# Patient Record
Sex: FEMALE | Race: WHITE | HISPANIC OR LATINO | Employment: PART TIME | URBAN - METROPOLITAN AREA
[De-identification: names, ages, dates, MRNs, and addresses within clinical notes are randomized per-mention and may not be internally consistent; named-entity substitution may affect disease eponyms.]

---
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
## 2022-11-14 ENCOUNTER — OFFICE VISIT (OUTPATIENT)
Dept: OBGYN CLINIC | Facility: CLINIC | Age: 22
End: 2022-11-14

## 2022-11-14 VITALS
HEIGHT: 66 IN | DIASTOLIC BLOOD PRESSURE: 70 MMHG | SYSTOLIC BLOOD PRESSURE: 112 MMHG | WEIGHT: 220.6 LBS | BODY MASS INDEX: 35.45 KG/M2

## 2022-11-14 DIAGNOSIS — N97.9 INFERTILITY, FEMALE: ICD-10-CM

## 2022-11-14 DIAGNOSIS — R63.5 WEIGHT GAIN: ICD-10-CM

## 2022-11-14 DIAGNOSIS — Z11.3 SCREENING FOR STD (SEXUALLY TRANSMITTED DISEASE): ICD-10-CM

## 2022-11-14 DIAGNOSIS — Z01.419 WELL WOMAN EXAM: Primary | ICD-10-CM

## 2022-11-14 RX ORDER — FAMOTIDINE 40 MG/1
40 TABLET, FILM COATED ORAL DAILY
COMMUNITY
Start: 2022-09-02

## 2022-11-14 RX ORDER — ESCITALOPRAM OXALATE 10 MG/1
10 TABLET ORAL DAILY
COMMUNITY
Start: 2022-09-02

## 2022-11-14 NOTE — PROGRESS NOTES
ASSESSMENT & PLAN:   Diagnoses and all orders for this visit:    Well woman exam    Screening for STD (sexually transmitted disease)  -     Chlamydia/GC amplified DNA by PCR    Infertility, female  -     Ambulatory Referral to Infertility; Future    Weight gain  -     TSH, 3rd generation with Free T4 reflex; Future  -     Insulin, fasting; Future    Other orders  -     escitalopram (LEXAPRO) 10 mg tablet; Take 10 mg by mouth daily  -     famotidine (PEPCID) 40 MG tablet; Take 40 mg by mouth daily          The following were reviewed in today's visit: ASCCP guidelines (Pap screening at age 24), Gardisil vaccination, STD testing breast self exam, STD testing, family planning choices, exercise and healthy diet  Patient to return to office in yearly for annual exam      All questions have been answered to her satisfaction  CC:  Annual Gynecologic Examination  Chief Complaint   Patient presents with   • New Patient Visit     States this is her first GYN visit, would like STD testing  States she has been trying to conceive for the last 9 months  HPI: Danish Singer is a 23 y o  No obstetric history on file  who presents for annual gynecologic examination  She has the following concerns:  Patient states she has been actively trying to conceive for 9 months without success  She states she has never been pregnant and her partner has never fathered any children  She states her periods are regular every 28 days and bleeding lasts 5-6 days  She would like a referral to a reproductive specialist   Patient reports she has noticed a 40lb weight gain in the last 6 months  She is not exercising and states her diet could be better  Health Maintenance:    Exercise: rarely  Breast exams/breast awareness: yes  Diet: could be better, currently in the process of moving      Past Medical History:   Diagnosis Date   • Depression    • GERD (gastroesophageal reflux disease)        History reviewed   No pertinent surgical history  Past OB/Gyn History:  Period Cycle (Days): 28  Period Duration (Days): 5-6  Period Pattern: Regular  Menstrual Flow: ModeratePatient's last menstrual period was 10/31/2022  Pap not indicated, start screening at age 24  HPV vaccine completed: Yes, 2014    Patient is currently sexually active     STD testing: yes  Current contraception: none      Family History  Family History   Problem Relation Age of Onset   • Thyroid disease Mother    • Cancer Father        Family history of uterine or ovarian cancer: no  Family history of breast cancer: no  Family history of colon cancer: no    Social History:  Social History     Socioeconomic History   • Marital status: Single     Spouse name: Not on file   • Number of children: Not on file   • Years of education: Not on file   • Highest education level: Not on file   Occupational History   • Not on file   Tobacco Use   • Smoking status: Passive Smoke Exposure - Never Smoker   • Smokeless tobacco: Never Used   Vaping Use   • Vaping Use: Every day   • Substances: Nicotine, Flavoring   Substance and Sexual Activity   • Alcohol use: Yes     Comment: socially   • Drug use: Yes     Types: Marijuana   • Sexual activity: Yes     Partners: Male     Birth control/protection: None   Other Topics Concern   • Not on file   Social History Narrative   • Not on file     Social Determinants of Health     Financial Resource Strain: Not on file   Food Insecurity: Not on file   Transportation Needs: Not on file   Physical Activity: Not on file   Stress: Not on file   Social Connections: Not on file   Intimate Partner Violence: Not on file   Housing Stability: Not on file     Domestic violence screen: negative      Allergies:  No Known Allergies    Medications:    Current Outpatient Medications:   •  escitalopram (LEXAPRO) 10 mg tablet, Take 10 mg by mouth daily, Disp: , Rfl:   •  famotidine (PEPCID) 40 MG tablet, Take 40 mg by mouth daily, Disp: , Rfl:     Review of Systems:  Review of Systems   Constitutional: Positive for unexpected weight change  Negative for chills and fever  Respiratory: Negative for shortness of breath  Cardiovascular: Negative for chest pain  Gastrointestinal: Negative for abdominal pain, diarrhea, nausea and vomiting  Skin: Negative for rash  Psychiatric/Behavioral: Negative for dysphoric mood  The patient is not nervous/anxious  Physical Exam:  /70 (BP Location: Right arm, Patient Position: Sitting, Cuff Size: Large)   Ht 5' 6" (1 676 m)   Wt 100 kg (220 lb 9 6 oz)   LMP 10/31/2022   BMI 35 61 kg/m²    Physical Exam  Constitutional:       General: She is not in acute distress  Appearance: Normal appearance  She is obese  She is not ill-appearing, toxic-appearing or diaphoretic  Genitourinary:      Vulva and urethral meatus normal       No lesions in the vagina  Right Labia: No rash or lesions  Left Labia: No lesions or rash  No vaginal discharge, erythema or bleeding  Right Adnexa: not tender and no mass present  Left Adnexa: not tender and no mass present  No cervical discharge or lesion  Uterus is not tender  Breasts:      Breast exam comments: deferred  HENT:      Head: Normocephalic and atraumatic  Cardiovascular:      Rate and Rhythm: Normal rate and regular rhythm  Heart sounds: Normal heart sounds  No murmur heard  No friction rub  No gallop  Pulmonary:      Effort: Pulmonary effort is normal       Breath sounds: Normal breath sounds  No wheezing, rhonchi or rales  Abdominal:      General: There is no distension  Palpations: Abdomen is soft  Tenderness: There is no abdominal tenderness  Musculoskeletal:      Cervical back: Neck supple  Neurological:      General: No focal deficit present  Mental Status: She is alert  Skin:     General: Skin is warm and dry     Psychiatric:         Mood and Affect: Mood normal          Behavior: Behavior normal    Vitals reviewed

## 2022-11-16 DIAGNOSIS — A74.9 CHLAMYDIA INFECTION: Primary | ICD-10-CM

## 2022-11-16 LAB
C TRACH DNA SPEC QL NAA+PROBE: POSITIVE
N GONORRHOEA DNA SPEC QL NAA+PROBE: NEGATIVE

## 2022-11-16 RX ORDER — DOXYCYCLINE 100 MG/1
100 TABLET ORAL 2 TIMES DAILY
Qty: 14 TABLET | Refills: 0 | Status: SHIPPED | OUTPATIENT
Start: 2022-11-16 | End: 2022-11-23

## 2022-12-16 ENCOUNTER — OFFICE VISIT (OUTPATIENT)
Dept: OBGYN CLINIC | Facility: CLINIC | Age: 22
End: 2022-12-16

## 2022-12-16 VITALS
BODY MASS INDEX: 36.8 KG/M2 | HEIGHT: 66 IN | DIASTOLIC BLOOD PRESSURE: 70 MMHG | SYSTOLIC BLOOD PRESSURE: 118 MMHG | WEIGHT: 229 LBS

## 2022-12-16 DIAGNOSIS — Z86.19 HISTORY OF CHLAMYDIA INFECTION: Primary | ICD-10-CM

## 2022-12-16 NOTE — PROGRESS NOTES
Assessment/Plan:  - Test of cure collected  - Patient to remain abstinent until negative test results  - Will reach out to patient with results       Diagnoses and all orders for this visit:    History of chlamydia infection  -     Chlamydia/GC amplified DNA by PCR          Subjective:      Patient ID: Jamaal Adams is a 23 y o  female  Randy Goff is a 25YO G0 female presenting to the office for a test of cure following treatment for CT infection  Patient was treated with doxy 100mg BID x 7 days  Patient states she completed the full antibiotic course  She and her partner have both been treated  Patient states they have been abstinent since diagnosis of infection  She denies vaginal discharge or pain  The following portions of the patient's history were reviewed and updated as appropriate: allergies, current medications, past family history, past medical history, past social history, past surgical history and problem list     Review of Systems   Constitutional: Negative for chills, fever and unexpected weight change  Respiratory: Negative for shortness of breath  Cardiovascular: Negative for chest pain  Gastrointestinal: Negative for abdominal pain  Genitourinary: Negative for vaginal discharge and vaginal pain  Skin: Negative for rash  Objective:      /70   Ht 5' 6" (1 676 m)   Wt 104 kg (229 lb)   LMP 11/29/2022   BMI 36 96 kg/m²          Physical Exam  Vitals reviewed  Constitutional:       Appearance: Normal appearance  HENT:      Head: Normocephalic and atraumatic  Pulmonary:      Effort: Pulmonary effort is normal    Abdominal:      General: There is no distension  Palpations: Abdomen is soft  Tenderness: There is no abdominal tenderness  Genitourinary:     General: Normal vulva  Exam position: Lithotomy position  Pubic Area: No rash  Labia:         Right: No rash or lesion  Left: No rash or lesion         Vagina: Vaginal discharge (white) present  No tenderness or lesions  Cervix: No discharge or lesion  Skin:     General: Skin is warm and dry  Findings: No lesion or rash  Neurological:      General: No focal deficit present  Mental Status: She is alert

## 2022-12-17 LAB
C TRACH DNA SPEC QL NAA+PROBE: NEGATIVE
N GONORRHOEA DNA SPEC QL NAA+PROBE: NEGATIVE

## 2023-01-03 ENCOUNTER — LAB (OUTPATIENT)
Dept: LAB | Facility: HOSPITAL | Age: 23
End: 2023-01-03

## 2023-01-03 DIAGNOSIS — R63.5 WEIGHT GAIN: ICD-10-CM

## 2023-01-03 LAB
INSULIN SERPL-ACNC: 19.3 MU/L (ref 3–25)
TSH SERPL DL<=0.05 MIU/L-ACNC: 0.5 UIU/ML (ref 0.45–4.5)

## 2023-01-09 ENCOUNTER — HOSPITAL ENCOUNTER (EMERGENCY)
Facility: HOSPITAL | Age: 23
Discharge: HOME | End: 2023-01-09
Attending: EMERGENCY MEDICINE

## 2023-01-09 ENCOUNTER — APPOINTMENT (EMERGENCY)
Dept: ULTRASOUND IMAGING | Facility: HOSPITAL | Age: 23
End: 2023-01-09

## 2023-01-09 VITALS
BODY MASS INDEX: 37.33 KG/M2 | TEMPERATURE: 98.8 F | DIASTOLIC BLOOD PRESSURE: 69 MMHG | WEIGHT: 231.26 LBS | RESPIRATION RATE: 16 BRPM | SYSTOLIC BLOOD PRESSURE: 102 MMHG | OXYGEN SATURATION: 100 % | HEART RATE: 75 BPM

## 2023-01-09 DIAGNOSIS — O20.0 THREATENED MISCARRIAGE IN EARLY PREGNANCY: Primary | ICD-10-CM

## 2023-01-09 LAB
ABO GROUP BLD: NORMAL
ABO GROUP BLD: NORMAL
ALBUMIN SERPL BCP-MCNC: 3.3 G/DL (ref 3.5–5)
ALP SERPL-CCNC: 91 U/L (ref 46–116)
ALT SERPL W P-5'-P-CCNC: 23 U/L (ref 12–78)
ANION GAP SERPL CALCULATED.3IONS-SCNC: 9 MMOL/L (ref 4–13)
APTT PPP: 29 SECONDS (ref 23–37)
AST SERPL W P-5'-P-CCNC: 17 U/L (ref 5–45)
B-HCG SERPL-ACNC: 1690 MIU/ML (ref 0–11.6)
BACTERIA UR QL AUTO: ABNORMAL /HPF
BASOPHILS # BLD AUTO: 0.04 THOUSANDS/ÂΜL (ref 0–0.1)
BASOPHILS NFR BLD AUTO: 0 % (ref 0–1)
BILIRUB SERPL-MCNC: 0.18 MG/DL (ref 0.2–1)
BILIRUB UR QL STRIP: NEGATIVE
BLD GP AB SCN SERPL QL: NEGATIVE
BUN SERPL-MCNC: 12 MG/DL (ref 5–25)
CALCIUM ALBUM COR SERPL-MCNC: 9.7 MG/DL (ref 8.3–10.1)
CALCIUM SERPL-MCNC: 9.1 MG/DL (ref 8.3–10.1)
CHLORIDE SERPL-SCNC: 103 MMOL/L (ref 96–108)
CLARITY UR: CLEAR
CO2 SERPL-SCNC: 26 MMOL/L (ref 21–32)
COLOR UR: YELLOW
CREAT SERPL-MCNC: 0.75 MG/DL (ref 0.6–1.3)
EOSINOPHIL # BLD AUTO: 0.29 THOUSAND/ÂΜL (ref 0–0.61)
EOSINOPHIL NFR BLD AUTO: 3 % (ref 0–6)
ERYTHROCYTE [DISTWIDTH] IN BLOOD BY AUTOMATED COUNT: 14.7 % (ref 11.6–15.1)
EXT PREGNANCY TEST URINE: POSITIVE
EXT. CONTROL: ABNORMAL
GFR SERPL CREATININE-BSD FRML MDRD: 115 ML/MIN/1.73SQ M
GLUCOSE SERPL-MCNC: 105 MG/DL (ref 65–140)
GLUCOSE UR STRIP-MCNC: NEGATIVE MG/DL
HCT VFR BLD AUTO: 36 % (ref 34.8–46.1)
HGB BLD-MCNC: 11.6 G/DL (ref 11.5–15.4)
HGB UR QL STRIP.AUTO: ABNORMAL
IMM GRANULOCYTES # BLD AUTO: 0.04 THOUSAND/UL (ref 0–0.2)
IMM GRANULOCYTES NFR BLD AUTO: 0 % (ref 0–2)
INR PPP: 1.02 (ref 0.84–1.19)
KETONES UR STRIP-MCNC: NEGATIVE MG/DL
LEUKOCYTE ESTERASE UR QL STRIP: ABNORMAL
LYMPHOCYTES # BLD AUTO: 2.2 THOUSANDS/ÂΜL (ref 0.6–4.47)
LYMPHOCYTES NFR BLD AUTO: 22 % (ref 14–44)
MCH RBC QN AUTO: 27.7 PG (ref 26.8–34.3)
MCHC RBC AUTO-ENTMCNC: 32.2 G/DL (ref 31.4–37.4)
MCV RBC AUTO: 86 FL (ref 82–98)
MONOCYTES # BLD AUTO: 0.53 THOUSAND/ÂΜL (ref 0.17–1.22)
MONOCYTES NFR BLD AUTO: 5 % (ref 4–12)
NEUTROPHILS # BLD AUTO: 6.95 THOUSANDS/ÂΜL (ref 1.85–7.62)
NEUTS SEG NFR BLD AUTO: 70 % (ref 43–75)
NITRITE UR QL STRIP: NEGATIVE
NON-SQ EPI CELLS URNS QL MICRO: ABNORMAL /HPF
NRBC BLD AUTO-RTO: 0 /100 WBCS
PH UR STRIP.AUTO: 6.5 [PH] (ref 4.5–8)
PLATELET # BLD AUTO: 367 THOUSANDS/UL (ref 149–390)
PMV BLD AUTO: 9.3 FL (ref 8.9–12.7)
POTASSIUM SERPL-SCNC: 3.8 MMOL/L (ref 3.5–5.3)
PROT SERPL-MCNC: 7.3 G/DL (ref 6.4–8.4)
PROT UR STRIP-MCNC: NEGATIVE MG/DL
PROTHROMBIN TIME: 13.4 SECONDS (ref 11.6–14.5)
RBC # BLD AUTO: 4.19 MILLION/UL (ref 3.81–5.12)
RBC #/AREA URNS AUTO: ABNORMAL /HPF
RH BLD: POSITIVE
RH BLD: POSITIVE
SODIUM SERPL-SCNC: 138 MMOL/L (ref 135–147)
SP GR UR STRIP.AUTO: 1.02 (ref 1–1.03)
SPECIMEN EXPIRATION DATE: NORMAL
UROBILINOGEN UR QL STRIP.AUTO: 0.2 E.U./DL
WBC # BLD AUTO: 10.05 THOUSAND/UL (ref 4.31–10.16)
WBC #/AREA URNS AUTO: ABNORMAL /HPF

## 2023-01-09 RX ADMIN — SODIUM CHLORIDE 1000 ML: 0.9 INJECTION, SOLUTION INTRAVENOUS at 14:47

## 2023-01-09 NOTE — ED PROVIDER NOTES
History  Chief Complaint   Patient presents with   • Vaginal Bleeding     Pt pregnant unsure how far along  LMP NOV 27th  Pt reports light spotting since yesterday  24 y/o female with vaginal bleeding/spotting since yest   She is about 6 weeks pregnant by LMP of 11/27  She hasn't been seen by ob-gyn yet and she didn't have an ultrasound yet  No abd  Pain now  No urinary symptoms  She's been vomiting the past few days  Not vomiting today, no diarrhea  Prior to Admission Medications   Prescriptions Last Dose Informant Patient Reported? Taking?   escitalopram (LEXAPRO) 10 mg tablet   Yes No   Sig: Take 10 mg by mouth daily   famotidine (PEPCID) 40 MG tablet   Yes No   Sig: Take 40 mg by mouth daily      Facility-Administered Medications: None       Past Medical History:   Diagnosis Date   • Depression    • GERD (gastroesophageal reflux disease)        History reviewed  No pertinent surgical history  Family History   Problem Relation Age of Onset   • Thyroid disease Mother    • Cancer Father      I have reviewed and agree with the history as documented  E-Cigarette/Vaping   • E-Cigarette Use Current Every Day User      E-Cigarette/Vaping Substances   • Nicotine Yes    • THC No    • CBD No    • Flavoring Yes    • Other No    • Unknown No      Social History     Tobacco Use   • Smoking status: Never     Passive exposure: Yes   • Smokeless tobacco: Never   Vaping Use   • Vaping Use: Every day   • Substances: Nicotine, Flavoring   Substance Use Topics   • Alcohol use: Not Currently     Comment: socially   • Drug use: Not Currently     Types: Marijuana       Review of Systems   Constitutional: Negative for appetite change, fatigue and fever  HENT: Negative for rhinorrhea and sore throat  Eyes: Negative for pain  Respiratory: Negative for cough, shortness of breath and wheezing  Cardiovascular: Negative for chest pain and leg swelling  Gastrointestinal: Positive for nausea and vomiting  Negative for abdominal pain and diarrhea  Genitourinary: Positive for vaginal bleeding  Negative for dysuria and flank pain  Musculoskeletal: Negative for back pain and neck pain  Skin: Negative for rash  Neurological: Negative for syncope and headaches  Psychiatric/Behavioral:        Mood normal       Physical Exam  Physical Exam  Vitals and nursing note reviewed  Constitutional:       Appearance: She is well-developed  HENT:      Head: Normocephalic and atraumatic  Right Ear: External ear normal       Left Ear: External ear normal    Eyes:      General: No scleral icterus  Extraocular Movements: Extraocular movements intact  Cardiovascular:      Rate and Rhythm: Normal rate and regular rhythm  Pulmonary:      Effort: Pulmonary effort is normal  No respiratory distress  Breath sounds: Normal breath sounds  Abdominal:      Palpations: Abdomen is soft  Tenderness: There is no abdominal tenderness  Musculoskeletal:         General: No deformity or signs of injury  Normal range of motion  Cervical back: Normal range of motion and neck supple  Skin:     General: Skin is warm and dry  Coloration: Skin is not jaundiced or pale  Neurological:      General: No focal deficit present  Mental Status: She is alert and oriented to person, place, and time     Psychiatric:         Mood and Affect: Mood normal          Behavior: Behavior normal          Vital Signs  ED Triage Vitals   Temperature Pulse Respirations Blood Pressure SpO2   01/09/23 1416 01/09/23 1416 01/09/23 1416 01/09/23 1416 01/09/23 1416   98 8 °F (37 1 °C) 100 17 119/69 98 %      Temp Source Heart Rate Source Patient Position - Orthostatic VS BP Location FiO2 (%)   01/09/23 1416 01/09/23 1416 01/09/23 1416 01/09/23 1416 --   Oral Monitor Sitting Right arm       Pain Score       01/09/23 1646       No Pain           Vitals:    01/09/23 1416 01/09/23 1646   BP: 119/69 102/69   Pulse: 100 75   Patient Position - Orthostatic VS: Sitting Sitting         Visual Acuity      ED Medications  Medications   sodium chloride 0 9 % bolus 1,000 mL (0 mL Intravenous Stopped 1/9/23 1646)       Diagnostic Studies  Results Reviewed     Procedure Component Value Units Date/Time    Quantitative hCG [[id number]]  (Abnormal) Collected: 01/09/23 1453    Lab Status: Final result Specimen: Blood from Arm, Left Updated: 01/09/23 1708     HCG, Quant 1,690 mIU/mL     Narrative:       Expected Ranges:     Approximate               Approximate HCG  Gestation age          Concentration ( mIU/mL)  _____________          ______________________   Catrina Bon                      HCG values  0 2-1                       5-50  1-2                           2-3                         100-5000  3-4                         500-33696  4-5                         1000-57001  5-6                         [id number]  6-8                         [id number]  8-12                        [id number]      Urine culture [[id number]] Collected: 01/09/23 1458    Lab Status:  In process Specimen: Urine, Clean Catch Updated: 01/09/23 1637    Urine Microscopic [[id number]]  (Abnormal) Collected: 01/09/23 1458    Lab Status: Final result Specimen: Urine Updated: 01/09/23 1601     RBC, UA None Seen /hpf      WBC, UA 4-10 /hpf      Epithelial Cells Occasional /hpf      Bacteria, UA Occasional /hpf     Comprehensive metabolic panel [[id number]]  (Abnormal) Collected: 01/09/23 1453    Lab Status: Final result Specimen: Blood from Arm, Left Updated: 01/09/23 1540     Sodium 138 mmol/L      Potassium 3 8 mmol/L      Chloride 103 mmol/L      CO2 26 mmol/L      ANION GAP 9 mmol/L      BUN 12 mg/dL      Creatinine 0 75 mg/dL      Glucose 105 mg/dL      Calcium 9 1 mg/dL      Corrected Calcium 9 7 mg/dL      AST 17 U/L      ALT 23 U/L      Alkaline Phosphatase 91 U/L      Total Protein 7 3 g/dL      Albumin 3 3 g/dL      Total Bilirubin 0 18 mg/dL      eGFR 115 ml/min/1 73sq m     Narrative:      National Kid[employer] Disease Foundation guidelines for Chronic Kidney Disease (CKD):   •  Stage 1 with normal or high GFR (GFR > 90 mL/min/1 73 square meters)  •  Stage 2 Mild CKD (GFR = 60-89 mL/min/1 73 square meters)  •  Stage 3A Moderate CKD (GFR = 45-59 mL/min/1 73 square meters)  •  Stage 3B Moderate CKD (GFR = 30-44 mL/min/1 73 square meters)  •  Stage 4 Severe CKD (GFR = 15-29 mL/min/1 73 square meters)  •  Stage 5 End Stage CKD (GFR <15 mL/min/1 73 square meters)  Note: GFR calculation is accurate only with a steady state creatinine    Protime-INR [[id number]]  (Normal) Collected: 01/09/23 1453    Lab Status: Final result Specimen: Blood from Arm, Left Updated: 01/09/23 1531     Protime 13 4 seconds      INR 1 02    APTT [[id number]]  (Normal) Collected: 01/09/23 1453    Lab Status: Final result Specimen: Blood from Arm, Left Updated: 01/09/23 1531     PTT 29 seconds     CBC and differential [[id number]] Collected: 01/09/23 1453    Lab Status: Final result Specimen: Blood from Arm, Left Updated: 01/09/23 1512     WBC 10 05 Thousand/uL      RBC 4 19 Million/uL      Hemoglobin 11 6 g/dL      Hematocrit 36 0 %      MCV 86 fL      MCH 27 7 pg      MCHC 32 2 g/dL      RDW 14 7 %      MPV 9 3 fL      Platelets 174 Thousands/uL      nRBC 0 /100 WBCs      Neutrophils Relative 70 %      Immat GRANS % 0 %      Lymphocytes Relative 22 %      Monocytes Relative 5 %      Eosinophils Relative 3 %      Basophils Relative 0 %      Neutrophils Absolute 6 95 Thousands/µL      Immature Grans Absolute 0 04 Thousand/uL      Lymphocytes Absolute 2 20 Thousands/µL      Monocytes Absolute 0 53 Thousand/µL      Eosinophils Absolute 0 29 Thousand/µL      Basophils Absolute 0 04 Thousands/µL     POCT pregnancy, urine [[id number]]  (Abnormal) Resulted: 01/09/23 1509    Lab Status: Final result Updated: 01/09/23 1509     EXT Preg Test, Ur Positive     Control Valid    Urine Macroscopic, POC [[id number]]  (Abnormal) Collected: 01/09/23 1458    Lab Status: Final result Specimen: Urine Updated: 01/09/23 1459     Color, UA Yellow     Clarity, UA Clear     pH, UA 6 5     Leukocytes, UA Trace     Nitrite, UA Negative     Protein, UA Negative mg/dl      Glucose, UA Negative mg/dl      Ketones, UA Negative mg/dl      Urobilinogen, UA 0 2 E U /dl      Bilirubin, UA Negative     Occult Blood, UA Trace     Specific [place], UA 1 025    Narrative:      CLINITEK RESULT                 US OB < 14 weeks single or first gestation level 1   Final Result by Morris Duval MD (01/09 1821)      Single gestational sac in the endometrial cavity with mean sac diameter of 5 mm and a 2 mm yolk sac  No fetal pole clearly visualized  Corpus luteum in right ovary  Otherwise unremarkable sonographic appearance of the ovaries  Workstation performed: [id number]                    Procedures  Procedures         ED Course                                             Medical Decision Making  I went over lab results and ultrasound with the pt   - she is stable to follow up with ob-gyn as an outpt  - she will call in the morning to get an appt  This week for a recheck and another ultrasound in the office  Threatened miscarriage in early pregnancy: acute illness or injury  Amount and/or Complexity of Data Reviewed  Labs: ordered  Radiology: ordered  Disposition  Final diagnoses:   Threatened miscarriage in early pregnancy     Time reflects when diagnosis was documented in both MDM as applicable and the Disposition within this note     Time User Action Codes Description Comment    1/9/2023  6:37 PM Deepak Durán Cha Add [O20 0] Threatened miscarriage in early pregnancy       ED Disposition     ED Disposition   Discharge    Condition   Stable    Date/Time   Mon Jan 9, 2023  6:37 PM    Comment   Temo Brooks discharge to home/self care                 Follow-up Information     Follow up With Specialties Details Why Contact Info Additional [id number] [facility]   [address] [facility] [place]  [address] [facility] [address]  [facility], [address] [facility] [place] [address] [place], [place], [address] [phone]          Discharge Medication List as of 1/9/2023  6:37 PM      CONTINUE these medications which have NOT CHANGED    Details   escitalopram (LEXAPRO) 10 mg tablet Take 10 mg by mouth daily, Starting Fri 9/2/2022, Historical Med      famotidine (PEPCID) 40 MG tablet Take 40 mg by mouth daily, Starting Fri 9/2/2022, Historical Med             No discharge procedures on file      PDMP Review     None          ED Provider  Electronically Signed by           Geneva Hughes MD  01/09/23 1613

## 2023-01-10 ENCOUNTER — TELEPHONE (OUTPATIENT)
Dept: OBGYN CLINIC | Facility: CLINIC | Age: 23
End: 2023-01-10

## 2023-01-10 NOTE — TELEPHONE ENCOUNTER
Patient called, she is newly pregnant  She has an US apt on 1/30  She went to the ED for an episode of spotting  Her HCG level yesterday was 1690  They did an US in the ED which showed a gestational sac in the endometrial cavity with mean diameter of 5 mm and a 2 mm yolk sac  No fetal pole clearly visualized  She had some more spotting after she left the ED  Today she denies any spotting  She doesn't have any pain or bleeding  The ED told her to call and make another US apt this week  Do you recommend doing this or possible an US next week  Do you want another set of HCG levels to be done tomorrow?

## 2023-01-11 LAB
BACTERIA UR CULT: ABNORMAL
BACTERIA UR CULT: ABNORMAL

## 2023-01-12 ENCOUNTER — TELEPHONE (OUTPATIENT)
Dept: EMERGENCY DEPT | Facility: HOSPITAL | Age: 23
End: 2023-01-12

## 2023-01-12 DIAGNOSIS — N39.0 UTI (URINARY TRACT INFECTION): Primary | ICD-10-CM

## 2023-01-12 RX ORDER — CEPHALEXIN 500 MG/1
500 CAPSULE ORAL EVERY 6 HOURS SCHEDULED
Qty: 28 CAPSULE | Refills: 0 | Status: SHIPPED | OUTPATIENT
Start: 2023-01-12 | End: 2023-01-19

## 2023-01-15 ENCOUNTER — HOSPITAL ENCOUNTER (EMERGENCY)
Facility: HOSPITAL | Age: 23
Discharge: HOME | End: 2023-01-15
Attending: EMERGENCY MEDICINE

## 2023-01-15 VITALS
SYSTOLIC BLOOD PRESSURE: 139 MMHG | HEART RATE: 89 BPM | DIASTOLIC BLOOD PRESSURE: 61 MMHG | BODY MASS INDEX: 37.26 KG/M2 | WEIGHT: 230.82 LBS | OXYGEN SATURATION: 99 % | RESPIRATION RATE: 17 BRPM | TEMPERATURE: 98.6 F

## 2023-01-15 DIAGNOSIS — O20.0 THREATENED MISCARRIAGE IN EARLY PREGNANCY: Primary | ICD-10-CM

## 2023-01-15 LAB
ALBUMIN SERPL BCP-MCNC: 3.2 G/DL (ref 3.5–5)
ALP SERPL-CCNC: 86 U/L (ref 46–116)
ALT SERPL W P-5'-P-CCNC: 19 U/L (ref 12–78)
ANION GAP SERPL CALCULATED.3IONS-SCNC: 8 MMOL/L (ref 4–13)
AST SERPL W P-5'-P-CCNC: 19 U/L (ref 5–45)
B-HCG SERPL-ACNC: 1613 MIU/ML (ref 0–11.6)
BACTERIA UR QL AUTO: ABNORMAL /HPF
BASOPHILS # BLD AUTO: 0.04 THOUSANDS/ÂΜL (ref 0–0.1)
BASOPHILS NFR BLD AUTO: 0 % (ref 0–1)
BILIRUB SERPL-MCNC: 0.28 MG/DL (ref 0.2–1)
BILIRUB UR QL STRIP: NEGATIVE
BUN SERPL-MCNC: 14 MG/DL (ref 5–25)
CALCIUM ALBUM COR SERPL-MCNC: 9.4 MG/DL (ref 8.3–10.1)
CALCIUM SERPL-MCNC: 8.8 MG/DL (ref 8.3–10.1)
CHLORIDE SERPL-SCNC: 103 MMOL/L (ref 96–108)
CLARITY UR: CLEAR
CO2 SERPL-SCNC: 23 MMOL/L (ref 21–32)
COLOR UR: YELLOW
CREAT SERPL-MCNC: 0.66 MG/DL (ref 0.6–1.3)
EOSINOPHIL # BLD AUTO: 0.3 THOUSAND/ÂΜL (ref 0–0.61)
EOSINOPHIL NFR BLD AUTO: 3 % (ref 0–6)
ERYTHROCYTE [DISTWIDTH] IN BLOOD BY AUTOMATED COUNT: 14.7 % (ref 11.6–15.1)
EXT PREGNANCY TEST URINE: POSITIVE
EXT. CONTROL: ABNORMAL
GFR SERPL CREATININE-BSD FRML MDRD: 127 ML/MIN/1.73SQ M
GLUCOSE SERPL-MCNC: 99 MG/DL (ref 65–140)
GLUCOSE UR STRIP-MCNC: NEGATIVE MG/DL
HCT VFR BLD AUTO: 34.9 % (ref 34.8–46.1)
HGB BLD-MCNC: 11.3 G/DL (ref 11.5–15.4)
HGB UR QL STRIP.AUTO: ABNORMAL
IMM GRANULOCYTES # BLD AUTO: 0.03 THOUSAND/UL (ref 0–0.2)
IMM GRANULOCYTES NFR BLD AUTO: 0 % (ref 0–2)
KETONES UR STRIP-MCNC: NEGATIVE MG/DL
LEUKOCYTE ESTERASE UR QL STRIP: ABNORMAL
LYMPHOCYTES # BLD AUTO: 2.87 THOUSANDS/ÂΜL (ref 0.6–4.47)
LYMPHOCYTES NFR BLD AUTO: 30 % (ref 14–44)
MCH RBC QN AUTO: 27.7 PG (ref 26.8–34.3)
MCHC RBC AUTO-ENTMCNC: 32.4 G/DL (ref 31.4–37.4)
MCV RBC AUTO: 86 FL (ref 82–98)
MONOCYTES # BLD AUTO: 0.56 THOUSAND/ÂΜL (ref 0.17–1.22)
MONOCYTES NFR BLD AUTO: 6 % (ref 4–12)
NEUTROPHILS # BLD AUTO: 5.78 THOUSANDS/ÂΜL (ref 1.85–7.62)
NEUTS SEG NFR BLD AUTO: 61 % (ref 43–75)
NITRITE UR QL STRIP: NEGATIVE
NON-SQ EPI CELLS URNS QL MICRO: ABNORMAL /HPF
NRBC BLD AUTO-RTO: 0 /100 WBCS
PH UR STRIP.AUTO: 7 [PH] (ref 4.5–8)
PLATELET # BLD AUTO: 372 THOUSANDS/UL (ref 149–390)
PMV BLD AUTO: 9.1 FL (ref 8.9–12.7)
POTASSIUM SERPL-SCNC: 3.9 MMOL/L (ref 3.5–5.3)
PROT SERPL-MCNC: 7 G/DL (ref 6.4–8.4)
PROT UR STRIP-MCNC: NEGATIVE MG/DL
RBC # BLD AUTO: 4.08 MILLION/UL (ref 3.81–5.12)
RBC #/AREA URNS AUTO: ABNORMAL /HPF
SODIUM SERPL-SCNC: 134 MMOL/L (ref 135–147)
SP GR UR STRIP.AUTO: 1.02 (ref 1–1.03)
UROBILINOGEN UR QL STRIP.AUTO: 0.2 E.U./DL
WBC # BLD AUTO: 9.58 THOUSAND/UL (ref 4.31–10.16)
WBC #/AREA URNS AUTO: ABNORMAL /HPF

## 2023-01-15 NOTE — ED PROVIDER NOTES
History  Chief Complaint   Patient presents with   • Vaginal Bleeding     Pt pregnant and presents with vaginal bleeding  Pt stated she was here last week and was told to come back if bleeding is heavier  Pt states when the bleeding started getting heavier she started with abdominal cramping as well  Patient is a 24 y/o female hx of GERD, depression, approximately 7 weeks pregnant (based on LMP ), , presenting to the ED for evaluation of vaginal bleeding  Pt was seen at [facility] on , had blood work and U/S, was dx with threatened miscarriage  Pt states at that time she was having vaginal spotting when she wiped and some cramping  Pt states yesterday she had 2 episodes of bright red blood gush out, with possible small clots, only wearing panty liner and not saturating  Pt had another episode today  Pt denies abdominal cramping, states she is having some vaginal cramping  No fevers, N/V, flank pain, urinary sx  [facility]   U/S showed:    Single gestational sac in the endometrial cavity with mean sac diameter of 5 mm and a 2 mm yolk sac  No fetal pole clearly visualized  Corpus luteum in right ovary  Otherwise unremarkable sonographic appearance of the ovaries  Prior to Admission Medications   Prescriptions Last Dose Informant Patient Reported? Taking? cephalexin (KEFLEX) 500 mg capsule   No Yes   Sig: Take 1 capsule (500 mg total) by mouth every 6 (six) hours for 7 days   escitalopram (LEXAPRO) 10 mg tablet   Yes No   Sig: Take 10 mg by mouth daily   famotidine (PEPCID) 40 MG tablet   Yes No   Sig: Take 40 mg by mouth daily      Facility-Administered Medications: None       Past Medical History:   Diagnosis Date   • Depression    • GERD (gastroesophageal reflux disease)        History reviewed  No pertinent surgical history      Family History   Problem Relation Age of Onset   • Thyroid disease Mother    • Cancer Father      I have reviewed and agree with the history as documented  E-Cigarette/Vaping   • E-Cigarette Use Current Every Day User      E-Cigarette/Vaping Substances   • Nicotine Yes    • THC No    • CBD No    • Flavoring Yes    • Other No    • Unknown No      Social History     Tobacco Use   • Smoking status: Never     Passive exposure: Yes   • Smokeless tobacco: Never   Vaping Use   • Vaping Use: Every day   • Substances: Nicotine, Flavoring   Substance Use Topics   • Alcohol use: Not Currently     Comment: socially   • Drug use: Not Currently     Types: Marijuana       Review of Systems   Constitutional: Negative for chills and fever  HENT: Negative for ear pain and sore throat  Eyes: Negative for visual disturbance  Respiratory: Negative for cough and shortness of breath  Cardiovascular: Negative for chest pain, palpitations and leg swelling  Gastrointestinal: Negative for abdominal pain, diarrhea, nausea and vomiting  Genitourinary: Positive for vaginal bleeding and vaginal pain  Negative for dysuria, hematuria and pelvic pain  Musculoskeletal: Negative for back pain and neck pain  Skin: Negative for rash  Neurological: Negative for speech difficulty and headaches  Psychiatric/Behavioral: Negative for confusion  Physical Exam  Physical Exam  Constitutional:       Appearance: Normal appearance  HENT:      Head: Normocephalic and atraumatic  Right Ear: External ear normal       Left Ear: External ear normal       Nose: Nose normal       Mouth/Throat:      Lips: Pink  Mouth: Mucous membranes are moist    Eyes:      Extraocular Movements: Extraocular movements intact  Conjunctiva/sclera: Conjunctivae normal    Pulmonary:      Effort: No tachypnea or respiratory distress  Abdominal:      Tenderness: There is no abdominal tenderness  Comments: No FHT   Musculoskeletal:      Cervical back: Normal range of motion and neck supple  Skin:     General: Skin is warm        Capillary Refill: Capillary refill takes less than 2 seconds  Neurological:      Mental Status: She is alert and oriented to person, place, and time  GCS: GCS eye subscore is 4  GCS verbal subscore is 5  GCS motor subscore is 6     Psychiatric:         Mood and Affect: Mood and affect normal          Speech: Speech normal              Vital Signs  ED Triage Vitals [01/15/23 1337]   Temperature Pulse Respirations Blood Pressure SpO2   98 6 °F (37 °C) 71 17 129/69 99 %      Temp Source Heart Rate Source Patient Position - Orthostatic VS BP Location FiO2 (%)   Oral Monitor Sitting Right arm --      Pain Score       --           Vitals:    01/15/23 1337 01/15/23 1600   BP: 129/69 139/61   Pulse: 71 89   Patient Position - Orthostatic VS: Sitting          Visual Acuity      ED Medications  Medications - No data to display    Diagnostic Studies  Results Reviewed     Procedure Component Value Units Date/Time    Quantitative hCG [[id number]]  (Abnormal) Collected: 01/15/23 1422    Lab Status: Final result Specimen: Blood from Arm, Right Updated: 01/15/23 1526     HCG, Quant 1,613 mIU/mL     Narrative:       Expected Ranges:     Approximate               Approximate HCG  Gestation age          Concentration ( mIU/mL)  _____________          ______________________   Alveta Garry                      HCG values  0 2-1                       5-50  1-2                           2-3                         100-5000  3-4                         500-16363  4-5                         1000-85486  5-6                         [id number]  6-8                         [id number]  8-12                        [id number]      Comprehensive metabolic panel [[id number]]  (Abnormal) Collected: 01/15/23 1422    Lab Status: Final result Specimen: Blood from Arm, Right Updated: 01/15/23 1452     Sodium 134 mmol/L      Potassium 3 9 mmol/L      Chloride 103 mmol/L      CO2 23 mmol/L      ANION GAP 8 mmol/L      BUN 14 mg/dL      Creatinine 0 66 mg/dL      Glucose 99 mg/dL      Calcium 8 8 mg/dL      Corrected Calcium 9 4 mg/dL      AST 19 U/L      ALT 19 U/L      Alkaline Phosphatase 86 U/L      Total Protein 7 0 g/dL      Albumin 3 2 g/dL      Total Bilirubin 0 28 mg/dL      eGFR 127 ml/min/1 73sq m     Narrative:      [place] guidelines for Chronic Kidney Disease (CKD):   •  Stage 1 with normal or high GFR (GFR > 90 mL/min/1 73 square meters)  •  Stage 2 Mild CKD (GFR = 60-89 mL/min/1 73 square meters)  •  Stage 3A Moderate CKD (GFR = 45-59 mL/min/1 73 square meters)  •  Stage 3B Moderate CKD (GFR = 30-44 mL/min/1 73 square meters)  •  Stage 4 Severe CKD (GFR = 15-29 mL/min/1 73 square meters)  •  Stage 5 End Stage CKD (GFR <15 mL/min/1 73 square meters)  Note: GFR calculation is accurate only with a steady state creatinine    Urine Microscopic [[id number]]  (Abnormal) Collected: 01/15/23 1405    Lab Status: Final result Specimen: Urine, Clean Catch Updated: 01/15/23 1448     RBC, UA Innumerable /hpf      WBC, UA None Seen /hpf      Epithelial Cells Occasional /hpf      Bacteria, UA Occasional /hpf     CBC and differential [[id number]]  (Abnormal) Collected: 01/15/23 1422    Lab Status: Final result Specimen: Blood from Arm, Right Updated: 01/15/23 1434     WBC 9 58 Thousand/uL      RBC 4 08 Million/uL      Hemoglobin 11 3 g/dL      Hematocrit 34 9 %      MCV 86 fL      MCH 27 7 pg      MCHC 32 4 g/dL      RDW 14 7 %      MPV 9 1 fL      Platelets 323 Thousands/uL      nRBC 0 /100 WBCs      Neutrophils Relative 61 %      Immat GRANS % 0 %      Lymphocytes Relative 30 %      Monocytes Relative 6 %      Eosinophils Relative 3 %      Basophils Relative 0 %      Neutrophils Absolute 5 78 Thousands/µL      Immature Grans Absolute 0 03 Thousand/uL      Lymphocytes Absolute 2 87 Thousands/µL      Monocytes Absolute 0 56 Thousand/µL      Eosinophils Absolute 0 30 Thousand/µL      Basophils Absolute 0 04 Thousands/µL     Urine culture [[id number]] Collected: 01/15/23 1405    Lab Status: In process Specimen: Urine, Clean Catch Updated: 01/15/23 1413    POCT pregnancy, urine [[id number]]  (Abnormal) Resulted: 01/15/23 1408    Lab Status: Final result Updated: 01/15/23 1408     EXT Preg Test, Ur Positive     Control Valid    Urine Macroscopic, POC [[id number]]  (Abnormal) Collected: 01/15/23 1405    Lab Status: Final result Specimen: Urine Updated: 01/15/23 1407     Color, UA Yellow     Clarity, UA Clear     pH, UA 7 0     Leukocytes, UA Trace     Nitrite, UA Negative     Protein, UA Negative mg/dl      Glucose, UA Negative mg/dl      Ketones, UA Negative mg/dl      Urobilinogen, UA 0 2 E U /dl      Bilirubin, UA Negative     Occult Blood, UA Large     Specific Gravity, UA 1 025    Narrative:      CLINITEK RESULT                 No orders to display              Procedures  Procedures         ED Course  ED Course as of 01/15/23 1652   Sun John 15, 2023   1404 23 type and screen: O+   1413 Urine culture  Culture sent   1435 Hemoglobin(!): 11 3  11 6 six days ago   1533 BHCG 1,613 (six days ago 1,690)   1534 TT to OBGYN   1546 OBGYN Resident states likely early pregnancy loss based off BHCG and symptoms, but ultrasound findings would not be confirmatory, will need repeat U/S this week to confirm diagnosis  Medical Decision Making  Patient is a 24 y/o female hx of GERD, depression, approximately 7 weeks pregnant (based on LMP ), , presenting to the ED for evaluation of vaginal bleeding      hgb stable   Vs stable  No heavy vaginal bleeding - no sx of lightheadedness/dizziness/SOB  O+ on 22  BHCG 1613 (down from 1690 on )   Discussed with OBGYN - likely early pregnancy loss based off BHCG and symptoms, but ultrasound findings would not be confirmatory, will need repeat U/S this week to confirm diagnosis  Patient has appointment with OB on Wednesday    Discussed reasons for return such as worsening pain, worsening vaginal bleeding, lightheadedness/dizziness/SOB    Patient verbalizes understanding and agrees with plan  The management plan was discussed in detail with the patient at bedside and all questions were answered  Prior to discharge, I provided both verbal and written instructions  I discussed with the patient the signs and symptoms for which to return to the emergency department  All questions were answered and patient was comfortable with the plan of care and discharged to home  The patient agrees to return to the Emergency Department for concerns and/or progression of illness  Threatened miscarriage in early pregnancy: acute illness or injury  Amount and/or Complexity of Data Reviewed  Labs: ordered  Decision-making details documented in ED Course  Disposition  Final diagnoses:   Threatened miscarriage in early pregnancy     Time reflects when diagnosis was documented in both MDM as applicable and the Disposition within this note     Time User Action Codes Description Comment    1/15/2023  3:48 PM Florencio Sethi Add [O20 0] Threatened miscarriage in early pregnancy       ED Disposition     ED Disposition   Discharge    Condition   Stable    Date/Time   Sun John 15, 2023  3:48 PM    [address] [place] [address] discharge to home/self care                 Follow-up Information     Follow up With Specialties Details Why Contact Info Additional [facility]  go to appointment on Wednesday [address] [facility], [facility] [address], [place], [address] [place], [place] [address]way [address] [place] [address]          Discharge Medication List as of 1/15/2023  3:49 PM      CONTINUE these medications which have NOT CHANGED    Details   cephalexin (KEFLEX) 500 mg capsule Take 1 capsule (500 mg total) by mouth every 6 (six) hours for 7 days, Starting Thu 1/12/2023, Until Thu 1/19/2023, Normal escitalopram (LEXAPRO) 10 mg tablet Take 10 mg by mouth daily, Starting Fri 9/2/2022, Historical Med      famotidine (PEPCID) 40 MG tablet Take 40 mg by mouth daily, Starting Fri 9/2/2022, Historical Med             No discharge procedures on file      PDMP Review     None          ED Provider  Electronically Signed by           Maira Doyle PA-C  01/15/23 8260

## 2023-01-16 LAB — BACTERIA UR CULT: NORMAL

## 2023-01-18 ENCOUNTER — OFFICE VISIT (OUTPATIENT)
Dept: OBGYN CLINIC | Facility: CLINIC | Age: 23
End: 2023-01-18

## 2023-01-18 VITALS — WEIGHT: 231 LBS | BODY MASS INDEX: 37.12 KG/M2 | HEIGHT: 66 IN

## 2023-01-18 DIAGNOSIS — O03.9 COMPLETE SPONTANEOUS ABORTION: Primary | ICD-10-CM

## 2023-01-18 NOTE — PROGRESS NOTES
Assessment/Plan:  - reviewed complete SAB  - Continue prenatal vitamins  - Discussed continued bleeding  - Pt to complete HCG one week after bleeding subsides  - Can try again for conceptionafter next normal period       Diagnoses and all orders for this visit:    Complete spontaneous   -     hCG, quantitative; Standing  -     hCG, quantitative          Subjective:      Patient ID: Marshal Brooks is a 23 y o  female  Anthony Portillo is a [id number] female presenting to the office for miscarriage follow up  Patient reports her LMP as  placing her at 7w3d today  She was seen in the ER for vaginal bleeding on  and 1/15  She had an US confirmed IUP with yolk sac and no fetal pole  Her HCG values were 1690 and 1630 and were decreasing  Patient reports that she had heavy bleeding with clots over the last 2 days which has now transitioned to pink spotting  The following portions of the patient's history were reviewed and updated as appropriate: allergies, current medications, past family history, past medical history, past social history, past surgical history and problem list     Review of Systems   Constitutional: Negative for chills, fever and unexpected weight change  Respiratory: Negative for shortness of breath  Cardiovascular: Negative for chest pain  Gastrointestinal: Negative for abdominal pain  Genitourinary: Positive for vaginal bleeding  Skin: Negative for rash  Objective:      Ht 5' 6" (1 676 m)   Wt 105 kg (231 lb)   LMP 2022 (Exact Date)   BMI 37 28 kg/m²          Physical Exam  Vitals reviewed  Constitutional:       Appearance: Normal appearance  She is normal weight  HENT:      Head: Normocephalic and atraumatic  Pulmonary:      Effort: Pulmonary effort is normal    Genitourinary:     General: Normal vulva  Labia:         Right: No rash or lesion  Left: No rash or lesion         Comments: TVUS reveals empty uterus  Skin:     General: Skin is warm and dry  Neurological:      General: No focal deficit present  Mental Status: She is alert  Psychiatric:         Mood and Affect: Mood normal          Behavior: Behavior normal            Ultrasound Probe Disinfection    A transvaginal ultrasound was performed     Prior to use, disinfection was performed with High Level Disinfection Process (Trophon)  Probe serial number RVRSDE: [id number] was used    Sharon Webb PA-C  01/18/23  10:45 AM

## 2023-04-21 ENCOUNTER — HOSPITAL ENCOUNTER (EMERGENCY)
Facility: HOSPITAL | Age: 23
Discharge: HOME | End: 2023-04-21
Attending: EMERGENCY MEDICINE | Admitting: EMERGENCY MEDICINE

## 2023-04-21 VITALS
RESPIRATION RATE: 17 BRPM | TEMPERATURE: 98.4 F | SYSTOLIC BLOOD PRESSURE: 119 MMHG | OXYGEN SATURATION: 99 % | HEART RATE: 74 BPM | DIASTOLIC BLOOD PRESSURE: 77 MMHG | WEIGHT: 238.76 LBS | BODY MASS INDEX: 38.54 KG/M2

## 2023-04-21 DIAGNOSIS — L05.01 PILONIDAL ABSCESS: Primary | ICD-10-CM

## 2023-04-21 RX ORDER — LIDOCAINE HYDROCHLORIDE 10 MG/ML
10 INJECTION, SOLUTION EPIDURAL; INFILTRATION; INTRACAUDAL; PERINEURAL ONCE
Status: COMPLETED | OUTPATIENT
Start: 2023-04-21 | End: 2023-04-21

## 2023-04-21 RX ORDER — OXYCODONE HYDROCHLORIDE AND ACETAMINOPHEN 5; 325 MG/1; MG/1
1 TABLET ORAL ONCE
Status: COMPLETED | OUTPATIENT
Start: 2023-04-21 | End: 2023-04-21

## 2023-04-21 RX ADMIN — OXYCODONE AND ACETAMINOPHEN 1 TABLET: 5; 325 TABLET ORAL at 05:35

## 2023-04-21 RX ADMIN — LIDOCAINE HYDROCHLORIDE 10 ML: 10 INJECTION, SOLUTION EPIDURAL; INFILTRATION; INTRACAUDAL; PERINEURAL at 05:38

## 2023-04-21 NOTE — Clinical Note
Alexandre Quezada was seen and treated in our emergency department on 4/21/2023  Diagnosis:     Laure Starr  may return to work on return date  She may return on this date: 04/22/2023         If you have any questions or concerns, please don't hesitate to call        Mercedes Barrientos PA-C    ______________________________           _______________          _______________  Hospital Representative                              Date                                Time

## 2023-04-21 NOTE — Clinical Note
Danis Cardeans was seen and treated in our emergency department on 4/21/2023  Diagnosis:     Angélica Dawn  may return to work on return date  She may return on this date: 04/23/2023         If you have any questions or concerns, please don't hesitate to call        Sylwia Silveira PA-C    ______________________________           _______________          _______________  Hospital Representative                              Date                                Time

## 2023-04-21 NOTE — DISCHARGE INSTRUCTIONS
Remove packing in 48 hours if unable to get in with surgery - pull the string    Change outside dressing every 12 hours (tape a couple of pieces of guaze on top)    Return for worsening pain, fever, chills, recurrence of abscess    Follow up with surgery ASAP

## 2023-04-24 NOTE — ED PROVIDER NOTES
"History  Chief Complaint   Patient presents with   • Back Pain     Pt states \"my back hurts at the top of my butt crack it hurts to walk and I cant bend down\"  Denies injury to area     The patient is a 21-year-old female presenting to the ED for evaluation of back pain  She reports the pain is at the top of her gluteal cleft  She reports having a \"boil\" in the area that has not gone away, and her pain has worsened over the past week  She otherwise denies fever, chills, rectal pain, melena, hematochezia, numbness, paresthesia, saddle anesthesia, bowel incontinence, urinary incontinence, constipation, urinary retention, history of IVDU, history of spinal surgery  Prior to Admission Medications   Prescriptions Last Dose Informant Patient Reported? Taking?   escitalopram (LEXAPRO) 10 mg tablet Not Taking  Yes No   Sig: Take 10 mg by mouth daily   Patient not taking: Reported on 4/21/2023   famotidine (PEPCID) 40 MG tablet Not Taking  Yes No   Sig: Take 40 mg by mouth daily   Patient not taking: Reported on 4/21/2023      Facility-Administered Medications: None       Past Medical History:   Diagnosis Date   • Depression    • GERD (gastroesophageal reflux disease)        History reviewed  No pertinent surgical history  Family History   Problem Relation Age of Onset   • Thyroid disease Mother    • Cancer Father      I have reviewed and agree with the history as documented  E-Cigarette/Vaping   • E-Cigarette Use Current Every Day User      E-Cigarette/Vaping Substances   • Nicotine Yes    • THC No    • CBD No    • Flavoring Yes    • Other No    • Unknown No      Social History     Tobacco Use   • Smoking status: Never     Passive exposure:  Yes   • Smokeless tobacco: Never   Vaping Use   • Vaping Use: Every day   • Substances: Nicotine, Flavoring   Substance Use Topics   • Alcohol use: Not Currently     Comment: socially   • Drug use: Not Currently     Types: Marijuana       Review of Systems " Constitutional: Negative for chills and fever  HENT: Negative for congestion and rhinorrhea  Respiratory: Negative for cough and shortness of breath  Cardiovascular: Negative for chest pain and leg swelling  Gastrointestinal: Negative for abdominal pain, constipation, diarrhea, nausea and vomiting  Genitourinary: Negative for dysuria and flank pain  Musculoskeletal: Positive for back pain  Negative for arthralgias and myalgias  Skin: Positive for wound  Negative for rash  Neurological: Negative for dizziness, weakness, numbness and headaches  Psychiatric/Behavioral: Negative for behavioral problems  Physical Exam  Physical Exam  Vitals and nursing note reviewed  Constitutional:       General: She is not in acute distress  Appearance: She is well-developed  She is not ill-appearing or toxic-appearing  HENT:      Head: Normocephalic and atraumatic  Eyes:      Conjunctiva/sclera: Conjunctivae normal    Cardiovascular:      Rate and Rhythm: Normal rate and regular rhythm  Pulses: Normal pulses  Pulmonary:      Effort: Pulmonary effort is normal  No respiratory distress  Abdominal:      General: Abdomen is flat  Musculoskeletal:         General: No swelling  Normal range of motion  Cervical back: Neck supple  Skin:     General: Skin is warm and dry  Capillary Refill: Capillary refill takes less than 2 seconds  Comments: Erythema, tenderness to palpation, and fluctuance to the superior aspect of the gluteal cleft  No drainage, induration  Neurological:      Mental Status: She is alert  Sensory: No sensory deficit  Motor: No weakness     Psychiatric:         Mood and Affect: Mood normal          Vital Signs  ED Triage Vitals   Temperature Pulse Respirations Blood Pressure SpO2   04/21/23 0443 04/21/23 0443 04/21/23 0443 04/21/23 0445 04/21/23 0443   98 4 °F (36 9 °C) 74 17 119/77 99 %      Temp Source Heart Rate Source Patient Position - Orthostatic VS BP Location FiO2 (%)   04/21/23 0443 04/21/23 0443 04/21/23 0443 04/21/23 0443 --   Oral Monitor Sitting Right arm       Pain Score       04/21/23 0535       9           Vitals:    04/21/23 0443 04/21/23 0445   BP:  119/77   Pulse: 74    Patient Position - Orthostatic VS: Sitting          Visual Acuity      ED Medications  Medications   lidocaine (PF) (XYLOCAINE-MPF) 1 % injection 10 mL (10 mL Infiltration Given by Other 4/21/23 0538)   oxyCODONE-acetaminophen (PERCOCET) 5-325 mg per tablet 1 tablet (1 tablet Oral Given 4/21/23 0535)       Diagnostic Studies  Results Reviewed     Procedure Component Value Units Date/Time    Wound culture and Gram stain [[id number]]  (Abnormal) Collected: 04/21/23 0619    Lab Status: Preliminary result Specimen: Wound from Pilonidal Cyst/Sinus Updated: 04/23/23 0933     Wound Culture Culture results to follow  Gram Stain Result 2+ Polys      2+ Gram positive cocci in pairs      2+ Gram negative rods                 No orders to display              Procedures  Incision and drain    Date/Time: 4/21/2023 5:50 AM  Performed by: Mercedes Barrientos PA-C  Authorized by: Mercedes Barrientos PA-C   [facility] Protocol:  Procedure performed by: Daphney Spencer PA-C)  Consent: Verbal consent obtained  Risks and benefits: risks, benefits and alternatives were discussed  Consent given by: patient  Patient understanding: patient states understanding of the procedure being performed  Patient consent: the patient's understanding of the procedure matches consent given  Required items: required blood products, implants, devices, and special equipment available  Patient identity confirmed: verbally with patient      Patient location:  ED  Location:     Type:  Abscess and pilonidal cyst    Location:  Anogenital    Anogenital location:  Pilonidal  Pre-procedure details:     Skin preparation:  Betadine  Anesthesia (see MAR for exact dosages):      Anesthesia method:  Local "infiltration    Local anesthetic:  Lidocaine 1% WITH epi  Procedure details:     Complexity:  Simple    Incision types:  Stab incision    Scalpel blade:  11    Wound management:  Probed and deloculated and irrigated with saline    Drainage:  Purulent and bloody    Drainage amount: Moderate    Wound treatment:  Packing placed    Packing materials:  1/4 in gauze  Post-procedure details:     Patient tolerance of procedure: Tolerated well, no immediate complications  POC MSK/Soft Tissue US    Date/Time: 4/21/2023 5:29 AM  Performed by: Maru Ray PA-C  Authorized by: Maru Ray PA-C     Patient location:  ED  Performed by:  NP/PA  Other Assisting Provider: No    Procedure:     Performed: soft tissue ultrasound    Procedure details:     Exam Type:  Diagnostic    Image availability:  Not saved  Soft tissue ultrasound:     Soft tissue indications: suspected abscess      Anatomic location:  Lower back    Soft tissue findings: subcutaneous collection (comment)      Soft tissue findings comment:  Abscess  Interpretation:     Soft tissue impressions: consistent with abscess               ED Course         CRAFFT    Flowsheet Row Most Recent Value   CRAFFT Initial Screen: During the past 12 months, did you:    1  Drink any alcohol (more than a few sips)? No Filed at: 04/21/2023 0446   2  Smoke any marijuana or hashish No Filed at: 04/21/2023 0446   3  Use anything else to get high? (\"anything else\" includes illegal drugs, over the counter and prescription drugs, and things that you sniff or 'buitrago')? No Filed at: 04/21/2023 0446        Medical Decision Making  DDx including but not limited to: Pilonidal abscess cellulitis, bleeding, seroma  On exam, patient with fluctuance, erythema, tenderness to the superior gluteal cleft consistent with a pilonidal abscess  Ultrasound obtained as above to confirm  Incision and drainage with packing as above      Discussed with patient importance of close " surgery follow-up  Referral placed  Instructions regarding packing removal if patient is unable to follow-up with surgery within 72 hours discussed with patient  Patient verbalized understanding and agreement  At the time of discharge, the patient is in no acute distress  I discussed with the patient the diagnosis, treatment plan, follow-up, return precautions, and discharge instructions; they were given the opportunity to ask questions and verbalized understanding  Pilonidal abscess: acute illness or injury  Amount and/or Complexity of Data Reviewed  External Data Reviewed: notes  Labs: ordered  Decision-making details documented in ED Course  Risk  Prescription drug management  Disposition  Final diagnoses:   Pilonidal abscess     Time reflects when diagnosis was documented in both MDM as applicable and the Disposition within this note     Time User Action Codes Description Comment    4/21/2023  6:13 AM Ary Newell [L05 01] Pilonidal abscess       ED Disposition     ED Disposition   Discharge    Condition   Stable    Date/Time   Fri Apr 21, 2023 [address] discharge to home/self care                 Follow-up Information     Follow up With Specialties Details Why Contact Info Additional Information    [facility] at [address]  [id number]  [address] [facility] [address] [facility]       [phone]          Discharge Medication List as of 4/21/2023  6:15 AM      CONTINUE these medications which have NOT CHANGED    Details   escitalopram (LEXAPRO) 10 mg tablet Take 10 mg by mouth daily, Starting Fri 9/2/2022, Historical Med      famotidine (PEPCID) 40 MG tablet Take 40 mg by mouth daily, Starting Fri 9/2/2022, Historical Med                 PDMP Review     None          ED Provider  Electronically Signed by           Mana Mancera PA-C  04/24/23 9500

## 2023-04-25 ENCOUNTER — OFFICE VISIT (OUTPATIENT)
Dept: FAMILY MEDICINE CLINIC | Facility: CLINIC | Age: 23
End: 2023-04-25

## 2023-04-25 VITALS
TEMPERATURE: 98.6 F | SYSTOLIC BLOOD PRESSURE: 126 MMHG | BODY MASS INDEX: 37.57 KG/M2 | DIASTOLIC BLOOD PRESSURE: 70 MMHG | HEIGHT: 66 IN | WEIGHT: 233.8 LBS | OXYGEN SATURATION: 98 % | HEART RATE: 89 BPM

## 2023-04-25 DIAGNOSIS — E66.09 CLASS 2 OBESITY DUE TO EXCESS CALORIES WITHOUT SERIOUS COMORBIDITY WITH BODY MASS INDEX (BMI) OF 37.0 TO 37.9 IN ADULT: Primary | ICD-10-CM

## 2023-04-25 DIAGNOSIS — Z00.00 WELL ADULT EXAM: ICD-10-CM

## 2023-04-25 DIAGNOSIS — R06.83 SNORING: ICD-10-CM

## 2023-04-25 DIAGNOSIS — E55.9 VITAMIN D DEFICIENCY: ICD-10-CM

## 2023-04-25 LAB
BACTERIA WND AEROBE CULT: ABNORMAL
BACTERIA WND AEROBE CULT: ABNORMAL
GRAM STN SPEC: ABNORMAL

## 2023-04-25 NOTE — PROGRESS NOTES
Name: Bryan Blackman      : 2000      MRN: [id number]  Encounter Provider: Lucy Casarez DO  Encounter Date: 2023   Encounter department: [address] [place] [address]  Class 2 obesity due to excess calories without serious comorbidity with body mass index (BMI) of 37 0 to 37 9 in adult  -     Lipid panel  -     Comprehensive metabolic panel  -     CBC and differential  -     TSH, 3rd generation with Free T4 reflex  -     Vitamin D 25 hydroxy  -     Ambulatory Referral to Weight Management; Future    2  Snoring    3  Vitamin D deficiency  -     Vitamin D 25 hydroxy    4  Well adult exam  -     Lipid panel  -     Comprehensive metabolic panel  -     CBC and differential  -     TSH, 3rd generation with Free T4 reflex  -     Vitamin D 25 hydroxy  -     Ambulatory Referral to Weight Management; Future      Depression Screening and Follow-up Plan: Patient was screened for depression during today's encounter  They screened negative with a PHQ-2 score of 1  Subjective     She presents to the office today for first time  She did suffer a miscarriage in February of this year  She is interested in weight loss  She had been taking Pepcid for her stomach but she does not feel it was strong enough  She is not taking anything right now  Review of Systems   Constitutional: Positive for unexpected weight change  HENT: Negative  Eyes: Negative  Respiratory: Negative  Cardiovascular: Negative  Gastrointestinal: Positive for abdominal pain  Endocrine: Negative  Genitourinary: Negative  Musculoskeletal: Negative  Skin: Negative  Allergic/Immunologic: Negative  Neurological: Negative  Hematological: Negative  Psychiatric/Behavioral: Negative  All other systems reviewed and are negative  Past Medical History:   Diagnosis Date   • Depression    • GERD (gastroesophageal reflux disease)      History reviewed   No pertinent surgical history  Family History   Problem Relation Age of Onset   • Thyroid disease Mother    • Cancer Father    • Hodgkin's lymphoma Father      Social History     Socioeconomic History   • Marital status: Single     Spouse name: None   • Number of children: None   • Years of education: None   • Highest education level: None   Occupational History   • None   Tobacco Use   • Smoking status: Never     Passive exposure:  Yes   • Smokeless tobacco: Never   Vaping Use   • Vaping Use: Every day   • Substances: Nicotine, Flavoring   Substance and Sexual Activity   • Alcohol use: Not Currently     Comment: socially   • Drug use: Not Currently     Types: Marijuana   • Sexual activity: Yes     Partners: Male     Birth control/protection: None   Other Topics Concern   • None   Social History Narrative   • None     Social Determinants of Health     Financial Resource Strain: Not on file   Food Insecurity: Not on file   Transportation Needs: Not on file   Physical Activity: Not on file   Stress: Not on file   Social Connections: Not on file   Intimate Partner Violence: Not on file   Housing Stability: Not on file     Current Outpatient Medications on File Prior to Visit   Medication Sig   • [DISCONTINUED] escitalopram (LEXAPRO) 10 mg tablet Take 10 mg by mouth daily (Patient not taking: Reported on 4/21/2023)   • [DISCONTINUED] famotidine (PEPCID) 40 MG tablet Take 40 mg by mouth daily (Patient not taking: Reported on 4/25/2023)     No Known Allergies  Immunization History   Administered Date(s) Administered   • DTaP 5 2002, 2002, 2002, 01/23/2003, 11/13/2003   • H1N1, All Formulations 11/19/2009   • HPV Quadrivalent 08/21/2013, 03/10/2014, 07/22/2014   • Hep A, ped/adol, 2 dose 08/15/2016   • Hep B, adult 2002, 2002, 01/23/2003   • Hib (PRP-OMP) 2002, 2002, 11/13/2003   • IPV 2002, 2002, 01/23/2003, 09/13/2006   • Influenza, seasonal, injectable 11/13/2003, 10/08/2004, "11/03/2005, 01/25/2011, 10/28/2011, 10/17/2012, 03/10/2014   • MMR 06/30/2003, 09/13/2006   • Meningococcal MCV4P 09/10/2019   • Meningococcal, Unknown Serogroups 08/21/2013   • Pneumococcal Conjugate PCV 7 2002, 2002, 01/23/2003, 11/13/2003   • Tdap 08/21/2013   • Varicella 06/30/2003, 09/03/2008       Objective     /70 (BP Location: Right arm, Patient Position: Sitting, Cuff Size: Standard)   Pulse 89   Temp 98 6 °F (37 °C) (Tympanic)   Ht 5' 6\" (1 676 m)   Wt 106 kg (233 lb 12 8 oz)   LMP 03/27/2023 (Approximate)   SpO2 98%   BMI 37 74 kg/m²     Physical Exam  Vitals and nursing note reviewed  Constitutional:       Appearance: She is well-developed  HENT:      Head: Normocephalic and atraumatic  Right Ear: External ear normal       Left Ear: External ear normal       Nose: Nose normal    Eyes:      Conjunctiva/sclera: Conjunctivae normal       Pupils: Pupils are equal, round, and reactive to light  Cardiovascular:      Rate and Rhythm: Normal rate and regular rhythm  Pulses: Normal pulses  Heart sounds: Normal heart sounds  Pulmonary:      Effort: Pulmonary effort is normal       Breath sounds: Normal breath sounds  Abdominal:      General: Bowel sounds are normal       Palpations: Abdomen is soft  Musculoskeletal:         General: Normal range of motion  Cervical back: Normal range of motion and neck supple  Skin:     General: Skin is warm and dry  Neurological:      General: No focal deficit present  Mental Status: She is alert and oriented to person, place, and time  Deep Tendon Reflexes: Reflexes are normal and symmetric     Psychiatric:         Mood and Affect: Mood normal          Behavior: Behavior normal        Lucy Dialloo, DO  "

## 2023-04-27 ENCOUNTER — TELEPHONE (OUTPATIENT)
Dept: SURGERY | Facility: CLINIC | Age: 23
End: 2023-04-27

## 2023-04-27 NOTE — TELEPHONE ENCOUNTER
Called patient regarding her appt today that she didn't show to and she states she have overslept and reschedule to next week Monday at at 11 am

## 2023-05-01 NOTE — PROGRESS NOTES
Assessment/Plan:   Troy Cullen is a 23 y  o female who is here for   Chief Complaint   Patient presents with    Cyst     Pilonidal cyst  Patient has in past, this time is worse  It came back about 4 days ago  Pain scale 10  On exam found to have Pilonidal abscess of the : midline gluteal region  Moderate amount of purulent drainage  Plan: I and D in the office under local anesthetic  One 1/2'' packing placed  ABD pad applied with tape  Will come in tomorrow for packing change  Positioning: lateral, right side up    Post Op Pain Management:   Motrin and Tylenol    - Patient has been instructed to avoid herbs or non-directed vitamins the week prior to surgery to ensure no drug interactions with perioperative surgical and anesthetic medications  - Patient should continue beta-blocker medication up through and including the day of surgery but hold any other hypertensive medications, including diuretics, unless instructed by PCP or anesthesia  - Patient should continue his statin medication up through and including the day of surgery   - Hold metformin , If on this medication, the morning of surgery and do not resume until 48 hours AFTER surgery to avoid risk of lactic acidosis  Do not resume if eGFR is < 30  - Insulin Management:If on Insulin, patient advised to call PCP for explicit instructions  In general, will need to take one-half normal dose am of surgery but pt advised to consult PCP before making any changes  - Patient has been instructed to avoid aspirin containing medications or non-steroidal anti-inflammatory drugs for SEVEN days preceding surgery  Preoperative Clearance: None          _______________________________________________________  CC:Cyst (Pilonidal cyst  Patient has in past, this time is worse  It came back about 4 days ago  Pain scale 10  )    HPI:  Troy Cullen is a 23 y  o female who was referred for evaluation of Cyst (Pilonidal cyst  Patient has in past, this time is worse  It came back about 4 days ago  Pain scale 10  )    Currently patient reports had I and D in ER on 4/21/23 then a skin check on 4/23/23 and packing was removed at that time  Today patient states it came back four days ago and now is a 10/10 with chills at home  She states she can not sit on the area  She has never had surgery on this area  ROS:  General ROS: negative  negative for - chills, fatigue, fever or night sweats, weight loss  Respiratory ROS: no cough, shortness of breath, or wheezing  Cardiovascular ROS: no chest pain or dyspnea on exertion  Genito-Urinary ROS: no dysuria, trouble voiding, or hematuria  Musculoskeletal ROS: negative for - gait disturbance, joint pain or muscle pain  Neurological ROS: no TIA or stroke symptoms  Skin ROS: See HPI  GI ROS: see HPI  Skin ROS: no new rashes or lesions   Lymphatic ROS: no new adenopathy noted by pt  Psy ROS: no new mental or behavioral disturbances       Patient Active Problem List   Diagnosis    Obesity    Snoring    Vitamin D deficiency         Allergies:  Patient has no known allergies  Current Outpatient Medications:     cephalexin (KEFLEX) 500 mg capsule, Take 1 capsule (500 mg total) by mouth every 6 (six) hours for 7 days, Disp: 28 capsule, Rfl: 0    clindamycin (CLEOCIN) 300 MG capsule, Take 1 capsule (300 mg total) by mouth 4 (four) times a day for 7 days, Disp: 28 capsule, Rfl: 0    Past Medical History:   Diagnosis Date    Depression     GERD (gastroesophageal reflux disease)        No past surgical history on file  Family History   Problem Relation Age of Onset    Thyroid disease Mother     Cancer Father     Hodgkin's lymphoma Father         reports that she has never smoked  She has been exposed to tobacco smoke  She has never used smokeless tobacco  She reports current alcohol use  She reports current drug use  Drug: Marijuana      Vitals:    05/04/23 1045   BP: 132/80   Pulse: (!) 106   Temp: 97 7 °F (36 5 °C)        PHYSICAL EXAM  General Appearance:    Alert, cooperative, no distress   Head:    Normocephalic without obvious abnormality   Eyes:    PERRL, conjunctiva/corneas clear     Neck:   Supple, no adenopathy, no JVD   Back:      Lungs:      Heart:     Abdomen:         Extremities:   Extremities normal  No clubbing, cyanosis or edema   Psych:   Normal Affect, AOx3  Neurologic:  Skin:   CNII-XII intact  Strength symmetric, speech intact    Warm, dry, intact, no visible rashes or lesions except as follows: There is minimal erythema and some induration  No fluctuation                    Taina López PA-C    Date: 5/4/2023 Time: 11:27 AM

## 2023-05-02 ENCOUNTER — HOSPITAL ENCOUNTER (EMERGENCY)
Facility: HOSPITAL | Age: 23
Discharge: HOME | End: 2023-05-02
Attending: EMERGENCY MEDICINE

## 2023-05-02 VITALS
WEIGHT: 229.5 LBS | HEART RATE: 70 BPM | DIASTOLIC BLOOD PRESSURE: 70 MMHG | BODY MASS INDEX: 37.04 KG/M2 | RESPIRATION RATE: 17 BRPM | OXYGEN SATURATION: 97 % | SYSTOLIC BLOOD PRESSURE: 126 MMHG | TEMPERATURE: 98.5 F

## 2023-05-02 DIAGNOSIS — L05.91 PILONIDAL CYST: Primary | ICD-10-CM

## 2023-05-02 RX ORDER — CEPHALEXIN 500 MG/1
500 CAPSULE ORAL EVERY 6 HOURS SCHEDULED
Qty: 28 CAPSULE | Refills: 0 | Status: SHIPPED | OUTPATIENT
Start: 2023-05-02 | End: 2023-05-09

## 2023-05-02 RX ORDER — CEPHALEXIN 250 MG/1
500 CAPSULE ORAL ONCE
Status: COMPLETED | OUTPATIENT
Start: 2023-05-02 | End: 2023-05-02

## 2023-05-02 RX ORDER — CEPHALEXIN 250 MG/1
500 CAPSULE ORAL EVERY 6 HOURS SCHEDULED
Status: DISCONTINUED | OUTPATIENT
Start: 2023-05-02 | End: 2023-05-02 | Stop reason: HOSPADM

## 2023-05-02 RX ADMIN — CEPHALEXIN 500 MG: 250 CAPSULE ORAL at 12:26

## 2023-05-02 NOTE — ED PROVIDER NOTES
History  Chief Complaint   Patient presents with    Abscess     Pt has an abscess above her buttocks that she had drained a 2 weeks ago, had the packing removed last week, it has closed but now is back  Pt denies fevers, denies drainage      This is a 23 YOF who presents to the ED for evaluation of a possible pilonidal abscess  Patient has a well-documented history of the same  Per chart review she has been seen in this ED multiple times for the same complaints at the abscess drain twice  Last time the abscess was drained she was given a referral to general surgery for further evaluation management, per chart review it appears the patient had Mr  Previous general surgery appointment  Patient does states she has a reappointment for follow-up with general surgery the day after tomorrow  Patient reports over the last 3 days she has been experiencing increased discomfort in the area  She reports localized pain at the top of her buttocks  States there has been no discharge, drainage, bleeding from the wound  She denies any fevers, chills, headaches, lightheadedness, chest pain, SOB, abdominal pain, NVD, dysuria  None       Past Medical History:   Diagnosis Date    Depression     GERD (gastroesophageal reflux disease)        History reviewed  No pertinent surgical history  Family History   Problem Relation Age of Onset    Thyroid disease Mother     Cancer Father     Hodgkin's lymphoma Father      I have reviewed and agree with the history as documented  E-Cigarette/Vaping    E-Cigarette Use Current Every Day User      E-Cigarette/Vaping Substances    Nicotine No     THC No     CBD No     Flavoring No     Other No     Unknown No      Social History     Tobacco Use    Smoking status: Never     Passive exposure:  Yes    Smokeless tobacco: Never   Vaping Use    Vaping Use: Every day   Substance Use Topics    Alcohol use: Not Currently     Comment: socially    Drug use: Yes     Types: Marijuana       Review of Systems   Constitutional: Negative for chills and fever  HENT: Negative  Respiratory: Negative for cough and shortness of breath  Cardiovascular: Negative for chest pain  Gastrointestinal: Negative for abdominal pain, diarrhea, nausea and vomiting  Musculoskeletal: Positive for back pain (Left gluteal cleft pain)  Neurological: Negative for dizziness, syncope, light-headedness and headaches  All other systems reviewed and are negative  Physical Exam  Physical Exam  Vitals and nursing note reviewed  Exam conducted with a chaperone present  Constitutional:       General: She is not in acute distress  Appearance: She is well-developed  She is not ill-appearing, toxic-appearing or diaphoretic  Comments: Afebrile, well-appearing patient exam   Polite conversational   Does not appear to be in any acute distress or significant discomfort at this time  HENT:      Head: Normocephalic and atraumatic  Eyes:      Conjunctiva/sclera: Conjunctivae normal    Cardiovascular:      Rate and Rhythm: Normal rate and regular rhythm  Heart sounds: No murmur heard  Pulmonary:      Effort: Pulmonary effort is normal  No respiratory distress  Breath sounds: Normal breath sounds  Abdominal:      General: Abdomen is flat  There is no distension  Palpations: Abdomen is soft  Tenderness: There is no abdominal tenderness  Musculoskeletal:         General: No swelling  Cervical back: Neck supple  Skin:     General: Skin is warm and dry  Capillary Refill: Capillary refill takes less than 2 seconds  Neurological:      General: No focal deficit present  Mental Status: She is alert and oriented to person, place, and time     Psychiatric:         Mood and Affect: Mood normal              Vital Signs  ED Triage Vitals [05/02/23 1146]   Temperature Pulse Respirations Blood Pressure SpO2   98 5 °F (36 9 °C) 70 17 126/70 97 %      Temp Source Heart Rate Source Patient Position - Orthostatic VS BP Location FiO2 (%)   Oral -- Sitting Right arm --      Pain Score       --           Vitals:    05/02/23 1146   BP: 126/70   Pulse: 70   Patient Position - Orthostatic VS: Sitting         Visual Acuity      ED Medications  Medications   cephalexin (KEFLEX) capsule 500 mg (500 mg Oral Given 5/2/23 1226)       Diagnostic Studies  Results Reviewed     None                 No orders to display              Procedures  Procedures         ED Course                                             Medical Decision Making  82-year-old female presents ED for evaluation of a pilonidal cyst   Per chart review patient has had pilonidal abscess in the past with I&D  Patient does have upcoming appoint with general surgery in 2 days  Patient afebrile with normal vital signs in ED, denies any other complaints at this time  Examination of the area does show a small area of induration, no signs of significant fluctuance, no signs of surrounding erythema, no signs of bleeding, discharge, drainage  Discussed options with patient, at this time she states if possible she would like to be placed on antibiotics but would defer any I&D/procedures for her general surgery appointment in 2 days  This plan sounds reasonable as patient is afebrile with normal vital signs and no obviously drainable abscess  Patient given Keflex prescription, first dose in ED, remaining prescription sent to pharmacy  Patient advised of importance of following up with general surgery for further evaluation and management  ED return precautions discussed with patient  Patient verbalizes understanding and agreement with plan  Risk  Prescription drug management            Disposition  Final diagnoses:   Pilonidal cyst     Time reflects when diagnosis was documented in both MDM as applicable and the Disposition within this note     Time User Action Codes Description Comment    5/2/2023 12:23 PM Gurmeet Pelletier Add [L05 [id number]] Pilonidal cyst       ED Disposition     ED Disposition   Discharge    Condition   Stable    Date/Time   Tue May 2, 2023 12:24 PM    Comment   Karson Chaidez discharge to home/self care  Follow-up Information    None         There are no discharge medications for this patient  No discharge procedures on file      PDMP Review     None          ED Provider  Electronically Signed by           Hosea Lara PA-C  05/02/23 0870

## 2023-05-02 NOTE — DISCHARGE INSTRUCTIONS
Take your Keflex as directed  Keep your general surgery appointment on Thursday and follow-up with general surgery for further evaluation management  Return to the ED if you develop any worsening symptoms as discussed prior to discharge

## 2023-05-04 ENCOUNTER — OFFICE VISIT (OUTPATIENT)
Dept: SURGERY | Facility: CLINIC | Age: 23
End: 2023-05-04

## 2023-05-04 VITALS
TEMPERATURE: 97.7 F | HEIGHT: 66 IN | HEART RATE: 106 BPM | SYSTOLIC BLOOD PRESSURE: 132 MMHG | WEIGHT: 232.4 LBS | BODY MASS INDEX: 37.35 KG/M2 | DIASTOLIC BLOOD PRESSURE: 80 MMHG

## 2023-05-04 DIAGNOSIS — L05.01 PILONIDAL ABSCESS: Primary | ICD-10-CM

## 2023-05-04 RX ORDER — CLINDAMYCIN HYDROCHLORIDE 300 MG/1
300 CAPSULE ORAL 4 TIMES DAILY
Qty: 28 CAPSULE | Refills: 0 | Status: SHIPPED | OUTPATIENT
Start: 2023-05-04 | End: 2023-05-11

## 2023-05-04 NOTE — PROGRESS NOTES
"Troy Cullen    Procedure:  Excisional debridement soft tissue  SITE: midline gluteal region     The area of infection as listed above was identified and marked  Confirmation of the patient's allergies was carried out  Patient was not allergic to local anesthesia  Written and verbal consent were obtained  A full time-out was carried out for this procedure  The area was prepped and draped in a sterile fashion  Local anesthesia:  Lidocaine,  1%,      with Epinephrine was used  Approximately:   5 cc were used  Using : scapel, the area of infection was excisionally debrided  Cultures were sent: no    Tissue:  Skin and soft tissue were removed with the specimen  Pathology sent: no     Loculations were broken up using finger blunt dissection and instrument dissection  There was moderate amount of purulent drainage  The wound was irrigated with sterile saline  The wound was packed with:  Hi Galarza 1/2\"    The wound was dressed with guaze and tape  The patient tolerated procedure well  There was minimal blood loss  There were no complications  Wound care and postoperative instructions were discussed and written instructions also given          Delfino Bauer PA-C      "

## 2023-05-05 ENCOUNTER — DOCUMENTATION (OUTPATIENT)
Dept: SURGERY | Facility: CLINIC | Age: 23
End: 2023-05-05

## 2023-05-05 NOTE — PROGRESS NOTES
Patient presents today for a packing change  Old packing was removed and wound was flushed with saline  About 6cm of new packing was placed in the wound  Wound was then covered up with a dry dressing  Patient has an appointment with Dr Pamela Weinberg on Monday 5/8  She is aware to call the office with any questions or concerns

## 2023-05-06 ENCOUNTER — NURSE TRIAGE (OUTPATIENT)
Dept: OTHER | Facility: OTHER | Age: 23
End: 2023-05-06

## 2023-05-06 NOTE — TELEPHONE ENCOUNTER
"  Reason for Disposition   Health Information question, no triage required and triager able to answer question    Answer Assessment - Initial Assessment Questions  1  REASON FOR CALL or QUESTION: \"What is your reason for calling today? \" or \"How can I best help you? \" or \"What question do you have that I can help answer? \"      Can I take the packing out of my wound before my appointment 5/8?     Protocols used: INFORMATION ONLY CALL - NO TRIAGE-ADULT-    "

## 2023-05-06 NOTE — TELEPHONE ENCOUNTER
Patient calling because she has a Pilonidal cyst that was drained and she just had the wound repacked yesterday  Patient states the packing is causing her slight discomfort and wanted to know if she could take it out before her appointment on 5/8  Explained to the patient that the packing is there to help soak up drainage from the wound to help the tissue heal from the inside out  After explaining this to patient she stated she would want to keep the packing in until her appointment, she did not know the purpose of the packing  Instructed the patient to call back for worsening pain or any further questions or concerns  Patient verbalized understanding

## 2023-05-06 NOTE — TELEPHONE ENCOUNTER
"Regarding: post surgerg wound care question  ----- Message from Nina Clayton sent at 5/6/2023  5:51 PM EDT -----  'I just had a procedure done for a Pilonidal cyst and they placed a drain on the outside   I was calling I wanted to knpw of I can take the cotton out of the drain\"    "

## 2023-05-08 ENCOUNTER — OFFICE VISIT (OUTPATIENT)
Dept: SURGERY | Facility: CLINIC | Age: 23
End: 2023-05-08

## 2023-05-08 VITALS
BODY MASS INDEX: 36.8 KG/M2 | SYSTOLIC BLOOD PRESSURE: 120 MMHG | HEIGHT: 66 IN | DIASTOLIC BLOOD PRESSURE: 74 MMHG | HEART RATE: 88 BPM | TEMPERATURE: 97.8 F | WEIGHT: 229 LBS

## 2023-05-08 DIAGNOSIS — L05.01 PILONIDAL CYST WITH ABSCESS: Primary | ICD-10-CM

## 2023-05-08 DIAGNOSIS — E66.9 OBESITY: ICD-10-CM

## 2023-05-08 DIAGNOSIS — F17.200 VAPING NICOTINE DEPENDENCE, NON-TOBACCO PRODUCT: ICD-10-CM

## 2023-05-08 RX ORDER — OMEPRAZOLE 40 MG/1
40 CAPSULE, DELAYED RELEASE ORAL DAILY
COMMUNITY
Start: 2023-05-05

## 2023-05-08 NOTE — PROGRESS NOTES
Assessment/Plan:   Kana Paulson is a 23 y  o female who is here for   Chief Complaint   Patient presents with   • Wound Check     Packing is out, patient tried to clean it and it came out  Patient said there is a lot of pus and some blood draining from the site  Very little pain  On exam found to have Pilonidal abscess of the : midline gluteal region  Moderate amount of purulent drainage  It was drained in the office on her previous visit a few days ago  Plan:    Pilonidal abscess  Will eventually need definitive excision  We had a long discussion regarding the long-term care management of an open wound  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  She will need at least 2 weeks off and might need quite a long more amount of time depending on her wound care, level of infection and her pain tolerance  Smoking cessation weight loss and any diabetes control would greatly improve her chances of reasonable wound healing  She  does vape, and this is the equivalent of smoking for his of wound healing and health care  Post Op Pain Management:   Motrin and Tylenol  Norco when in hospital     Pilonidal cyst in this patient population can be very challenging including the risk of open wound, prolonged wound healing, repeat or recurrent disease  It is very likely she will have an open wound that will be left to heal with secondary intention and/or with a wound VAC if it is amenable to a wound VAC  These are complex wounds requiring additional management  She understands      Preoperative Clearance: None          _______________________________________________________  CC: Wound Check (Packing is out, patient tried to clean it and it came out  Patient said there is a lot of pus and some blood draining from the site  Very little pain  )    HPI:  Kana Paulson is a 23 y  o female who was referred for evaluation of Wound Check (Packing is out, patient tried to clean it and it came out  Patient said there is a lot of pus and some blood draining from the site  Very little pain  )    Currently patient reports had I and D in ER on 4/21/23 then a skin check on 4/23/23 and packing was removed at that time  Today patient states it came back four days ago and now is a 10/10 with chills at home  She states she can not sit on the area  She has never had surgery on this area  ROS:  General ROS: negative  negative for - chills, fatigue, fever or night sweats, weight loss  Respiratory ROS: no cough, shortness of breath, or wheezing  Cardiovascular ROS: no chest pain or dyspnea on exertion  Genito-Urinary ROS: no dysuria, trouble voiding, or hematuria  Musculoskeletal ROS: negative for - gait disturbance, joint pain or muscle pain  Neurological ROS: no TIA or stroke symptoms  Skin ROS: See HPI  GI ROS: see HPI  Skin ROS: no new rashes or lesions   Lymphatic ROS: no new adenopathy noted by pt  Psy ROS: no new mental or behavioral disturbances       Patient Active Problem List   Diagnosis   • Obesity   • Snoring   • Vitamin D deficiency         Allergies:  Patient has no known allergies  Current Outpatient Medications:   •  cephalexin (KEFLEX) 500 mg capsule, Take 1 capsule (500 mg total) by mouth every 6 (six) hours for 7 days, Disp: 28 capsule, Rfl: 0  •  clindamycin (CLEOCIN) 300 MG capsule, Take 1 capsule (300 mg total) by mouth 4 (four) times a day for 7 days, Disp: 28 capsule, Rfl: 0  •  omeprazole (PriLOSEC) 40 MG capsule, Take 40 mg by mouth daily, Disp: , Rfl:     Past Medical History:   Diagnosis Date   • Depression    • GERD (gastroesophageal reflux disease)        No past surgical history on file  Family History   Problem Relation Age of Onset   • Thyroid disease Mother    • Cancer Father    • Hodgkin's lymphoma Father         reports that she has never smoked  She has been exposed to tobacco smoke  She has never used smokeless tobacco  She reports current alcohol use   She reports current drug use  Drug: Marijuana  Vitals:    05/08/23 1416   BP: 120/74   Pulse: 88   Temp: 97 8 °F (36 6 °C)        PHYSICAL EXAM  General Appearance:    Alert, cooperative, no distress   Head:    Normocephalic without obvious abnormality   Eyes:    PERRL, conjunctiva/corneas clear     Neck:   Supple, no adenopathy, no JVD   Back:      Lungs:      Heart:     Abdomen:         Extremities:   Extremities normal  No clubbing, cyanosis or edema   Psych:   Normal Affect, AOx3  Neurologic:  Skin:   CNII-XII intact  Strength symmetric, speech intact    Warm, dry, intact, no visible rashes or lesions except as follows: There is minimal erythema and some induration  No fluctuation  3 to 5 mm defect  Packing removed  No induration or redness today                 Douglas Bhat MD    Date: 5/8/2023 Time: 2:43 PM

## 2023-05-16 ENCOUNTER — OFFICE VISIT (OUTPATIENT)
Dept: OBGYN CLINIC | Facility: CLINIC | Age: 23
End: 2023-05-16

## 2023-05-16 VITALS
WEIGHT: 230.2 LBS | SYSTOLIC BLOOD PRESSURE: 124 MMHG | HEART RATE: 66 BPM | HEIGHT: 66 IN | DIASTOLIC BLOOD PRESSURE: 71 MMHG | BODY MASS INDEX: 37 KG/M2

## 2023-05-16 DIAGNOSIS — B37.31 VAGINAL CANDIDIASIS: ICD-10-CM

## 2023-05-16 DIAGNOSIS — N89.8 VAGINAL DISCHARGE: ICD-10-CM

## 2023-05-16 DIAGNOSIS — Z59.9 FINANCIAL DIFFICULTIES: ICD-10-CM

## 2023-05-16 DIAGNOSIS — N89.8 VAGINA ITCHING: Primary | ICD-10-CM

## 2023-05-16 LAB
BV WHIFF TEST VAG QL: ABNORMAL
CLUE CELLS SPEC QL WET PREP: ABNORMAL
PH SMN: 4.5 [PH]
SL AMB POCT WET MOUNT: ABNORMAL
T VAGINALIS VAG QL WET PREP: ABNORMAL
YEAST VAG QL WET PREP: ABNORMAL

## 2023-05-16 RX ORDER — FLUCONAZOLE 150 MG/1
150 TABLET ORAL ONCE
Qty: 1 TABLET | Refills: 0 | Status: SHIPPED | OUTPATIENT
Start: 2023-05-16 | End: 2023-05-16

## 2023-05-16 SDOH — ECONOMIC STABILITY - INCOME SECURITY: PROBLEM RELATED TO HOUSING AND ECONOMIC CIRCUMSTANCES, UNSPECIFIED: Z59.9

## 2023-05-16 NOTE — PATIENT INSTRUCTIONS
Thank you for your confidence in our team    We appreciate you and welcome your feedback  If you receive a survey from us, please take a few moments to let us know how we are doing     Sincerely,  Donette Apley

## 2023-05-16 NOTE — PROGRESS NOTES
"PROBLEM GYNECOLOGICAL VISIT    Abdirizak Murphy is a 23 y o  female who presents today with complaint of vaginal itching/burning and discharge  Her general medical history has been reviewed and she reports it as follows:    Past Medical History:   Diagnosis Date   • Asthma    • Chlamydia    • Depression    • GERD (gastroesophageal reflux disease)      Past Surgical History:   Procedure Laterality Date   • NO PAST SURGERIES       OB History        1    Para   0    Term   0       0    AB   1    Living   0       SAB   1    IAB   0    Ectopic   0    Multiple   0    Live Births   0               Social History     Tobacco Use   • Smoking status: Never   • Smokeless tobacco: Never   • Tobacco comments:     Vape occ   Vaping Use   • Vaping Use: Some days   • Substances: Nicotine, Flavoring   Substance Use Topics   • Alcohol use: Yes     Comment: couple times/month   • Drug use: Yes     Types: Marijuana     Comment: daily marijuana use     Social History     Substance and Sexual Activity   Sexual Activity Yes   • Partners: Male   • Birth control/protection: None       Current Outpatient Medications   Medication Instructions   • omeprazole (PRILOSEC) 40 mg, Oral, Daily       History of Present Illness:   Reports for the past 5-7 days she has been experiencing whitish/pinkish vaginal discharge with associated vaginal itching/burning  Denies pelvic pain  Denies vaginal odor  Reports that she was recently treated with Keflex and Clindamycin for pilonidal cyst     Review of Systems:  Review of Systems   Constitutional: Negative  Gastrointestinal: Negative  Genitourinary: Positive for vaginal discharge and vaginal pain  Negative for menstrual problem and pelvic pain         Physical Exam:  /71   Pulse 66   Ht 5' 6\" (1 676 m)   Wt 104 kg (230 lb 3 2 oz)   LMP 2023   Breastfeeding No   BMI 37 16 kg/m²   Physical Exam  Constitutional:       General: She is not in acute " distress  Genitourinary:      Vulva exam comments: Erythema of mucous membranes  Vaginal discharge and erythema present  Neurological:      Mental Status: She is alert  Skin:     General: Skin is warm and dry  Vitals reviewed  Point of Care Testing:   -Wet mount: no clue cells, no trichomonads, rare WBC, pH=4 5   -KOH mount: + hyphae   -Whiff: negative    Assessment:   1  Vaginal candidiasis  Plan:   1  Given Rx Diflucan  2  Return to office in 2 months for first annual GYN exam and pap smear  3  Patient's depression screening was assessed with a PHQ-2 score of 0  Their PHQ-9 score was 3  Clinically patient does not have depression  No treatment is required

## 2023-05-25 ENCOUNTER — APPOINTMENT (EMERGENCY)
Dept: CT IMAGING | Facility: HOSPITAL | Age: 23
End: 2023-05-25

## 2023-05-25 ENCOUNTER — HOSPITAL ENCOUNTER (EMERGENCY)
Facility: HOSPITAL | Age: 23
Discharge: HOME | End: 2023-05-25
Attending: EMERGENCY MEDICINE

## 2023-05-25 VITALS
SYSTOLIC BLOOD PRESSURE: 109 MMHG | BODY MASS INDEX: 37.61 KG/M2 | DIASTOLIC BLOOD PRESSURE: 67 MMHG | RESPIRATION RATE: 18 BRPM | HEART RATE: 79 BPM | WEIGHT: 233.03 LBS | OXYGEN SATURATION: 98 % | TEMPERATURE: 99 F

## 2023-05-25 DIAGNOSIS — Z32.00 ENCOUNTER FOR PREGNANCY TEST: Primary | ICD-10-CM

## 2023-05-25 DIAGNOSIS — K08.89 PAIN, DENTAL: ICD-10-CM

## 2023-05-25 DIAGNOSIS — R59.0 REACTIVE CERVICAL LYMPHADENOPATHY: ICD-10-CM

## 2023-05-25 DIAGNOSIS — R93.89 ABNORMAL CT SCAN, NECK: ICD-10-CM

## 2023-05-25 DIAGNOSIS — J02.9 SORE THROAT: ICD-10-CM

## 2023-05-25 LAB
ANION GAP SERPL CALCULATED.3IONS-SCNC: 6 MMOL/L (ref 4–13)
BASOPHILS # BLD AUTO: 0.05 THOUSANDS/ÂΜL (ref 0–0.1)
BASOPHILS NFR BLD AUTO: 1 % (ref 0–1)
BILIRUB UR QL STRIP: NEGATIVE
BUN SERPL-MCNC: 19 MG/DL (ref 5–25)
CALCIUM SERPL-MCNC: 9.2 MG/DL (ref 8.4–10.2)
CHLORIDE SERPL-SCNC: 106 MMOL/L (ref 96–108)
CLARITY UR: CLEAR
CO2 SERPL-SCNC: 25 MMOL/L (ref 21–32)
COLOR UR: YELLOW
CREAT SERPL-MCNC: 0.81 MG/DL (ref 0.6–1.3)
EOSINOPHIL # BLD AUTO: 0.13 THOUSAND/ÂΜL (ref 0–0.61)
EOSINOPHIL NFR BLD AUTO: 1 % (ref 0–6)
ERYTHROCYTE [DISTWIDTH] IN BLOOD BY AUTOMATED COUNT: 14.6 % (ref 11.6–15.1)
EXT PREGNANCY TEST URINE: NEGATIVE
EXT. CONTROL: NORMAL
FLUAV RNA RESP QL NAA+PROBE: NEGATIVE
FLUBV RNA RESP QL NAA+PROBE: NEGATIVE
GFR SERPL CREATININE-BSD FRML MDRD: 104 ML/MIN/1.73SQ M
GLUCOSE SERPL-MCNC: 91 MG/DL (ref 65–140)
GLUCOSE UR STRIP-MCNC: NEGATIVE MG/DL
HCT VFR BLD AUTO: 37.2 % (ref 34.8–46.1)
HGB BLD-MCNC: 11.5 G/DL (ref 11.5–15.4)
HGB UR QL STRIP.AUTO: NEGATIVE
IMM GRANULOCYTES # BLD AUTO: 0.03 THOUSAND/UL (ref 0–0.2)
IMM GRANULOCYTES NFR BLD AUTO: 0 % (ref 0–2)
KETONES UR STRIP-MCNC: NEGATIVE MG/DL
LEUKOCYTE ESTERASE UR QL STRIP: NEGATIVE
LYMPHOCYTES # BLD AUTO: 2.93 THOUSANDS/ÂΜL (ref 0.6–4.47)
LYMPHOCYTES NFR BLD AUTO: 31 % (ref 14–44)
MCH RBC QN AUTO: 27.4 PG (ref 26.8–34.3)
MCHC RBC AUTO-ENTMCNC: 30.9 G/DL (ref 31.4–37.4)
MCV RBC AUTO: 89 FL (ref 82–98)
MONOCYTES # BLD AUTO: 0.61 THOUSAND/ÂΜL (ref 0.17–1.22)
MONOCYTES NFR BLD AUTO: 6 % (ref 4–12)
NEUTROPHILS # BLD AUTO: 5.73 THOUSANDS/ÂΜL (ref 1.85–7.62)
NEUTS SEG NFR BLD AUTO: 61 % (ref 43–75)
NITRITE UR QL STRIP: NEGATIVE
NRBC BLD AUTO-RTO: 0 /100 WBCS
PH UR STRIP.AUTO: 6.5 [PH] (ref 4.5–8)
PLATELET # BLD AUTO: 334 THOUSANDS/UL (ref 149–390)
PMV BLD AUTO: 9.2 FL (ref 8.9–12.7)
POTASSIUM SERPL-SCNC: 4 MMOL/L (ref 3.5–5.3)
PROT UR STRIP-MCNC: NEGATIVE MG/DL
RBC # BLD AUTO: 4.2 MILLION/UL (ref 3.81–5.12)
RSV RNA RESP QL NAA+PROBE: NEGATIVE
S PYO DNA THROAT QL NAA+PROBE: NOT DETECTED
SARS-COV-2 RNA RESP QL NAA+PROBE: NEGATIVE
SODIUM SERPL-SCNC: 137 MMOL/L (ref 135–147)
SP GR UR STRIP.AUTO: >=1.03 (ref 1–1.03)
UROBILINOGEN UR QL STRIP.AUTO: 0.2 E.U./DL
WBC # BLD AUTO: 9.48 THOUSAND/UL (ref 4.31–10.16)

## 2023-05-25 RX ORDER — AMOXICILLIN 500 MG/1
500 CAPSULE ORAL EVERY 12 HOURS SCHEDULED
Qty: 20 CAPSULE | Refills: 0 | Status: SHIPPED | OUTPATIENT
Start: 2023-05-25 | End: 2023-06-04

## 2023-05-25 RX ORDER — AMOXICILLIN 250 MG/1
500 CAPSULE ORAL ONCE
Status: COMPLETED | OUTPATIENT
Start: 2023-05-25 | End: 2023-05-25

## 2023-05-25 RX ORDER — FLUCONAZOLE 150 MG/1
150 TABLET ORAL ONCE
COMMUNITY
Start: 2023-05-16 | End: 2023-05-25

## 2023-05-25 RX ADMIN — DEXAMETHASONE SODIUM PHOSPHATE 10 MG: 10 INJECTION, SOLUTION INTRAMUSCULAR; INTRAVENOUS at 14:44

## 2023-05-25 RX ADMIN — IOHEXOL 100 ML: 350 INJECTION, SOLUTION INTRAVENOUS at 14:00

## 2023-05-25 RX ADMIN — AMOXICILLIN 500 MG: 250 CAPSULE ORAL at 14:44

## 2023-05-25 NOTE — ED PROVIDER NOTES
History  Chief Complaint   Patient presents with   • Sore Throat     Pt c/o sore throat and cough  and feels swollen down the right side of her neck  for about 3-4 days  Denies fevers    • Pregnancy Test     Pt would like a pregnancy test did have her cycle last month but spotted two weeks ago and has been nauseous      Patient is a 27-year-old female with no significant past medical history is accompanied to emergency department by her significant other for evaluation of sore throat  Patient states she started with sore throat about 3 to 4 days ago  She states there is associated cough occasionally  She also notes that she has left lower dental pain to her wisdom tooth as it is partially erupted from the gum  She initially thought that her symptoms were from the tooth or a cold however they have been gradually worsening  She states she also has pain now to the right side of her neck and she feels like it slightly swollen  She has pain with swallowing  She also would like a pregnancy test as she has been having some nausea  She does note that she had a period last month and had spotting 2 weeks ago  She denies fever, chills, headache, body aches, difficulty breathing, chest pain, shortness of breath, abdominal pain, vomiting or diarrhea, ear pain, congestion, difficulty moving her neck, rash  Prior to Admission Medications   Prescriptions Last Dose Informant Patient Reported?  Taking?   multivitamin (THERAGRAN) TABS   Yes Yes   Sig: Take 1 tablet by mouth daily   omeprazole (PriLOSEC) 40 MG capsule  Self Yes Yes   Sig: Take 40 mg by mouth every morning      Facility-Administered Medications: None       Past Medical History:   Diagnosis Date   • Asthma    • Chlamydia 2022   • Depression    • GERD (gastroesophageal reflux disease)        Past Surgical History:   Procedure Laterality Date   • NO PAST SURGERIES         Family History   Problem Relation Age of Onset   • Thyroid disease Mother    • Cancer Father         Hodgkin's   • Hodgkin's lymphoma Father    • Breast cancer Neg Hx    • Colon cancer Neg Hx    • Ovarian cancer Neg Hx      I have reviewed and agree with the history as documented  E-Cigarette/Vaping   • E-Cigarette Use Current Some Day User      E-Cigarette/Vaping Substances   • Nicotine Yes    • THC No    • CBD No    • Flavoring Yes      Social History     Tobacco Use   • Smoking status: Never   • Smokeless tobacco: Never   • Tobacco comments:     Vape occ   Vaping Use   • Vaping Use: Some days   • Substances: Nicotine, Flavoring   Substance Use Topics   • Alcohol use: Yes     Comment: couple times/month   • Drug use: Yes     Frequency: 7 0 times per week     Types: Marijuana     Comment: daily marijuana use       Review of Systems   Constitutional: Negative for chills and fever  HENT: Positive for dental problem and sore throat  Negative for ear discharge, ear pain and rhinorrhea  Respiratory: Positive for cough  Negative for shortness of breath  Cardiovascular: Negative for chest pain  Gastrointestinal: Positive for nausea  Negative for abdominal pain, diarrhea and vomiting  Genitourinary: Negative for decreased urine volume  Musculoskeletal: Positive for neck pain  Negative for neck stiffness  Skin: Negative for rash  Neurological: Negative for headaches  All other systems reviewed and are negative  Physical Exam  Physical Exam  Vitals and nursing note reviewed  Constitutional:       General: She is not in acute distress  Appearance: Normal appearance  She is normal weight  She is not ill-appearing or diaphoretic  HENT:      Head: Normocephalic and atraumatic  Right Ear: Tympanic membrane, ear canal and external ear normal       Left Ear: Tympanic membrane, ear canal and external ear normal       Nose: Nose normal       Mouth/Throat:      Lips: Pink  Mouth: Mucous membranes are moist       Dentition: Dental tenderness present   No dental abscesses or gum lesions  Pharynx: Oropharynx is clear  Uvula midline  Posterior oropharyngeal erythema present  No pharyngeal swelling, oropharyngeal exudate or uvula swelling  Tonsils: No tonsillar exudate or tonsillar abscesses  3+ on the right  3+ on the left  Comments: Posterior oropharynx and tonsils with erythema  No vesicles, petechiae, exudate  No sign of peritonsillar abscess  Tonsils symmetrically enlarged and 3+ bilaterally  Handles oral secretions well without difficulty  Patient also has a partially erupted right lower wisdom tooth with some mild gum erythema/swelling without fluctuance or dental abscess noted  Tenderness to palpation over this tooth and gum  Eyes:      Conjunctiva/sclera: Conjunctivae normal    Neck:        Comments: Reproducible tenderness palpation over the right lateral neck  Palpable cervical lymphadenopathy  No palpable mass, fluctuance, induration, crepitus  Range of motion intact and nontender with movement  No erythema, swelling, warmth  Cardiovascular:      Rate and Rhythm: Normal rate and regular rhythm  Heart sounds: Normal heart sounds  No murmur heard  Pulmonary:      Effort: Pulmonary effort is normal  No respiratory distress  Breath sounds: Normal breath sounds  No stridor  No wheezing, rhonchi or rales  Abdominal:      General: Abdomen is flat  Bowel sounds are normal  There is no distension  Palpations: Abdomen is soft  Tenderness: There is no abdominal tenderness  There is no guarding  Musculoskeletal:         General: Normal range of motion  Cervical back: Full passive range of motion without pain, normal range of motion and neck supple  Tenderness present  No edema, erythema, rigidity or crepitus  No pain with movement or spinous process tenderness  Normal range of motion  Lymphadenopathy:      Cervical: Cervical adenopathy present  Skin:     General: Skin is warm and dry     Neurological:      General: No focal deficit present  Mental Status: She is alert  Psychiatric:         Mood and Affect: Mood normal          Vital Signs  ED Triage Vitals [05/25/23 1205]   Temperature Pulse Respirations Blood Pressure SpO2   99 °F (37 2 °C) 79 18 109/67 98 %      Temp src Heart Rate Source Patient Position - Orthostatic VS BP Location FiO2 (%)   -- -- -- Right arm --      Pain Score       --           Vitals:    05/25/23 1205   BP: 109/67   Pulse: 79         Visual Acuity      ED Medications  Medications   iohexol (OMNIPAQUE) 350 MG/ML injection (SINGLE-DOSE) 100 mL (100 mL Intravenous Given 5/25/23 1400)   dexamethasone oral liquid 10 mg 1 mL (10 mg Oral Given 5/25/23 1444)   amoxicillin (AMOXIL) capsule 500 mg (500 mg Oral Given 5/25/23 1444)       Diagnostic Studies  Results Reviewed     Procedure Component Value Units Date/Time    FLU/RSV/COVID - if FLU/RSV clinically relevant [[id number]]  (Normal) Collected: 05/25/23 1252    Lab Status: Final result Specimen: Nares from Nose Updated: 05/25/23 1430     SARS-CoV-2 Negative     INFLUENZA A PCR Negative     INFLUENZA B PCR Negative     RSV PCR Negative    Narrative:      FOR PEDIATRIC PATIENTS - copy/paste COVID Guidelines URL to browser: [email or web address]  [employer]    SARS-CoV-2 assay is a Nucleic Acid Amplification assay intended for the  qualitative detection of nucleic acid from SARS-CoV-2 in nasopharyngeal  swabs  Results are for the presumptive identification of SARS-CoV-2 RNA  Positive results are indicative of infection with SARS-CoV-2, the virus  causing COVID-19, but do not rule out bacterial infection or co-infection  with other viruses  Laboratories within the [place] and its  territories are required to report all positive results to the appropriate  public health authorities   Negative results do not preclude SARS-CoV-2  infection and should not be used as the sole basis for treatment or other  patient management decisions  Negative results must be combined with  clinical observations, patient history, and epidemiological information  This test has not been FDA cleared or approved  This test has been authorized by FDA under an Emergency Use Authorization  (EUA)  This test is only authorized for the duration of time the  declaration that circumstances exist justifying the authorization of the  emergency use of an in vitro diagnostic tests for detection of SARS-CoV-2  virus and/or diagnosis of COVID-19 infection under section 564(b)(1) of  the Act, 21 U  S C  193ZJZ-2(F)(6), unless the authorization is terminated  or revoked sooner  The test has been validated but independent review by FDA  and CLIA is pending  Test performed using [employer] Gene[employer]pert: This RT-PCR assay targets N2,  a region unique to SARS-CoV-2  A conserved region in the E-gene was chosen  for pan-Sarbecovirus detection which includes SARS-CoV-2  According to CMS-2020-01-R, this platform meets the definition of high-throughput technology      Strep A PCR [[id number]]  (Normal) Collected: 05/25/23 1252    Lab Status: Final result Specimen: Throat Updated: 05/25/23 1417     STREP A PCR Not Detected    Basic metabolic panel [[id number]] Collected: 05/25/23 1252    Lab Status: Final result Specimen: Blood from Arm, Left Updated: 05/25/23 1318     Sodium 137 mmol/L      Potassium 4 0 mmol/L      Chloride 106 mmol/L      CO2 25 mmol/L      ANION GAP 6 mmol/L      BUN 19 mg/dL      Creatinine 0 81 mg/dL      Glucose 91 mg/dL      Calcium 9 2 mg/dL      eGFR 104 ml/min/1 73sq m     Narrative:      [place] guidelines for Chronic Kidney Disease (CKD):   •  Stage 1 with normal or high GFR (GFR > 90 mL/min/1 73 square meters)  •  Stage 2 Mild CKD (GFR = 60-89 mL/min/1 73 square meters)  •  Stage 3A Moderate CKD (GFR = 45-59 mL/min/1 73 square meters)  •  Stage 3B Moderate CKD (GFR = 30-44 mL/min/1 73 square meters)  •  Stage 4 Severe CKD (GFR = 15-29 mL/min/1 73 square meters)  •  Stage 5 End Stage CKD (GFR <15 mL/min/1 73 square meters)  Note: GFR calculation is accurate only with a steady state creatinine    CBC and differential [[id number]]  (Abnormal) Collected: 05/25/23 1252    Lab Status: Final result Specimen: Blood from Arm, Left Updated: 05/25/23 1259     WBC 9 48 Thousand/uL      RBC 4 20 Million/uL      Hemoglobin 11 5 g/dL      Hematocrit 37 2 %      MCV 89 fL      MCH 27 4 pg      MCHC 30 9 g/dL      RDW 14 6 %      MPV 9 2 fL      Platelets 306 Thousands/uL      nRBC 0 /100 WBCs      Neutrophils Relative 61 %      Immat GRANS % 0 %      Lymphocytes Relative 31 %      Monocytes Relative 6 %      Eosinophils Relative 1 %      Basophils Relative 1 %      Neutrophils Absolute 5 73 Thousands/µL      Immature Grans Absolute 0 03 Thousand/uL      Lymphocytes Absolute 2 93 Thousands/µL      Monocytes Absolute 0 61 Thousand/µL      Eosinophils Absolute 0 13 Thousand/µL      Basophils Absolute 0 05 Thousands/µL     POCT pregnancy, urine [[id number]]  (Normal) Resulted: 05/25/23 1225    Lab Status: Final result Updated: 05/25/23 1225     EXT Preg Test, Ur Negative     Control Valid    Urine Macroscopic, POC [[id number]] Collected: 05/25/23 1223    Lab Status: Final result Specimen: Urine Updated: 05/25/23 1225     Color, UA Yellow     Clarity, UA Clear     pH, UA 6 5     Leukocytes, UA Negative     Nitrite, UA Negative     Protein, UA Negative mg/dl      Glucose, UA Negative mg/dl      Ketones, UA Negative mg/dl      Urobilinogen, UA 0 2 E U /dl      Bilirubin, UA Negative     Occult Blood, UA Negative     Specific Gravity, UA >=1 030    Narrative:      CLINITEK RESULT                 CT soft tissue neck with contrast   Final Result by Yasmin Mendez MD (05/25 1432)      Minimal asymmetric stranding in the right lower submandibular region without discrete collection        Mildly prominent right-sided cervical lymph nodes are likely reactive in "nature  Workstation performed: [id number]                    Procedures  Procedures         ED Course         CRAFFT    Flowsheet Row Most Recent Value   CRAFFT Initial Screen: During the past 12 months, did you:    1  Drink any alcohol (more than a few sips)? No Filed at: 05/25/2023 1241   2  Smoke any marijuana or hashish No Filed at: 05/25/2023 1241   3  Use anything else to get high? (\"anything else\" includes illegal drugs, over the counter and prescription drugs, and things that you sniff or 'buitrago')? No Filed at: 05/25/2023 1241                                          Medical Decision Making  Patient is a 77-year-old female with no significant past medical history presents for evaluation of sore throat, occasional cough, right lower dental pain, right-sided neck pain/swelling, pain with swallowing  Symptoms for started 3-4 days ago and has been gradually worsening  Patient would also like a pregnancy test as she has been having some nausea  Does note that she had a period a month ago and had some spotting 2 weeks ago  Has not taken a test at home  Patient is well-appearing, nontoxic and in no acute distress  Vital signs stable  She has erythema noted to the posterior oropharynx and tonsils  Tonsils are symmetrically enlarged and 3+ bilaterally without vesicles, petechiae, exudate  No sign of peritonsillar abscess  Also has a partially erupted right lower wisdom tooth with some mild gum swelling/erythema and tenderness  No dental abscess noted  Handles oral secretions well without difficulty  She has some right-sided cervical lymphadenopathy and tenderness to the right lateral neck without erythema, edema, mass, induration, abscess, crepitus  Range of motion intact  Pregnancy test negative here  Discussed results with patient  We will check basic labs, strep, COVID/flu/RSV, CT scan soft tissue neck  Lab evaluation unremarkable  COVID/flu/RSV negative  Strep negative    CT scan soft " tissue neck- Minimal asymmetric stranding in the right lower submandibular region without discrete collection  Mildly prominent right-sided cervical lymph nodes are likely reactive in nature  Discussed all results with patient  Will treat with dose of Decadron here and amoxicillin  The management plan was discussed in detail with the patient at bedside and all questions were answered  aDniel Mei to discharge, we provided both verbal and written instructions   We discussed with the patient the signs and symptoms for which to return to the emergency department   All questions were answered and patient was comfortable with the plan of care and discharged to home   Instructed the patient to follow up with the primary care provider and/or specialist provided and their written instructions   The patient verbalized understanding of our discussion and plan of care, and agrees to return to the Emergency Department for concerns and progression of illness      At discharge, I instructed the patient to:  -follow up with pcp  -follow up with the recommended specialists- ENT and Oral surgery  -return to the ER if symptoms worsened or new symptoms arose  Patient agreed to this plan and was stable at time of discharge  Abnormal CT scan, neck: acute illness or injury  Encounter for pregnancy test: acute illness or injury  Pain, dental: acute illness or injury  Reactive cervical lymphadenopathy: acute illness or injury  Sore throat: acute illness or injury  Amount and/or Complexity of Data Reviewed  Labs: ordered  Decision-making details documented in ED Course  Radiology: ordered  Decision-making details documented in ED Course  Risk  Prescription drug management            Disposition  Final diagnoses:   Encounter for pregnancy test   Sore throat   Pain, dental   Reactive cervical lymphadenopathy   Abnormal CT scan, neck - submandibular stranding, no collection     Time reflects when diagnosis was documented in both MDM as applicable and the Disposition within this note     Time User Action Codes Description Comment    5/25/2023  2:42 PM Adrienne Lei Add [Z32 00] Encounter for pregnancy test     5/25/2023  2:42 PM Adrienne Lei Add [J02 9] Sore throat     5/25/2023  2:42 PM Adrienne Lei Add [K08 89] Pain, dental     5/25/2023  2:42 PM Adrienne Lei Add [R59 0] Reactive cervical lymphadenopathy     5/25/2023  2:42 PM Adrienne Lei Add [R93 89] Abnormal CT scan, neck     5/25/2023  2:42 PM Adrienne Lei Modify [R93 89] Abnormal CT scan, neck submandibular stranding, no collection      ED Disposition     ED Disposition   Discharge    Condition   Stable    Date/Time   Thu May 25, 2023  2:41 PM    [address] [place] [address] discharge to home/self care                 Follow-up Information     Follow up With Specialties Details Why Contact Info Additional Information    Kathrin Estrada DO Family Medicine Schedule an appointment as soon as possible for a visit   [facility] [address] [place] [address] [facility] Otolaryngology Schedule an appointment as soon as possible for a visit  As needed [address] [place] [phone]  [address] [place] [address], [address] [facility],[address] Floor, [place], [place] [address]    [facility] for Oral [facility]  Schedule an appointment as soon as possible for a visit  As needed [address]     [address]  Emergency Department Emergency Medicine  If symptoms worsen [facility] [id number]  [address] [facility] Emergency Department, [address] [place] [address] [facility] [place], [place], [address]          Discharge Medication List as of 5/25/2023  2:43 PM      START taking these medications    Details   amoxicillin (AMOXIL) 500 mg capsule Take 1 capsule (500 mg total) by mouth every 12 (twelve) hours for 10 days, Starting Thu 5/25/2023, Until Sun 6/4/2023, Normal      multivitamin (THERAGRAN) TABS Take 1 tablet by mouth daily, Historical Med         CONTINUE these medications which have NOT CHANGED    Details   omeprazole (PriLOSEC) 40 MG capsule Take 40 mg by mouth every morning, Starting Fri 5/5/2023, Historical Med             No discharge procedures on file      PDMP Review     None          ED Provider  Electronically Signed by           Cristi Walter PA-C  05/25/23 2050

## 2023-05-29 ENCOUNTER — TELEPHONE (OUTPATIENT)
Dept: OTHER | Facility: OTHER | Age: 23
End: 2023-05-29

## 2023-05-29 NOTE — TELEPHONE ENCOUNTER
Patient is calling regarding cancelling an appointment      Date/Time:05/31/2023    Patient was rescheduled: YES [] NO [x]    Patient requesting call back to reschedule: YES [] NO [x]

## 2023-06-26 ENCOUNTER — PATIENT OUTREACH (OUTPATIENT)
Dept: OBGYN CLINIC | Facility: CLINIC | Age: 23
End: 2023-06-26

## 2023-06-26 NOTE — PROGRESS NOTES
MORIAH GUTHRIE called pt today in regards to [facility] referral  Pt reports that she and her fiance live together  She is currently the only one working, she works as a HHA in an apartment complex, her partner has been out of work for 3-4 months and looking for new employment  She reports they sometimes have a hard time paying the rent on time but do usually manage to make it happen  MORIAH GUTHRIE provided her information for [employer] and [employer] if ever they do fall behind, also send her information for career link for her partner, will close this referral at this time

## 2023-07-07 ENCOUNTER — CONSULT (OUTPATIENT)
Dept: BARIATRICS | Facility: CLINIC | Age: 23
End: 2023-07-07
Payer: COMMERCIAL

## 2023-07-07 VITALS
SYSTOLIC BLOOD PRESSURE: 102 MMHG | DIASTOLIC BLOOD PRESSURE: 77 MMHG | HEART RATE: 86 BPM | WEIGHT: 228.4 LBS | RESPIRATION RATE: 16 BRPM | BODY MASS INDEX: 36.71 KG/M2 | HEIGHT: 66 IN

## 2023-07-07 DIAGNOSIS — E66.09 CLASS 2 OBESITY DUE TO EXCESS CALORIES WITHOUT SERIOUS COMORBIDITY WITH BODY MASS INDEX (BMI) OF 37.0 TO 37.9 IN ADULT: ICD-10-CM

## 2023-07-07 DIAGNOSIS — Z00.00 WELL ADULT EXAM: ICD-10-CM

## 2023-07-07 PROCEDURE — 99244 OFF/OP CNSLTJ NEW/EST MOD 40: CPT | Performed by: PHYSICIAN ASSISTANT

## 2023-07-07 RX ORDER — METFORMIN HYDROCHLORIDE 750 MG/1
750 TABLET, EXTENDED RELEASE ORAL
Qty: 30 TABLET | Refills: 2 | Status: SHIPPED | OUTPATIENT
Start: 2023-07-07 | End: 2023-08-06

## 2023-07-07 NOTE — ASSESSMENT & PLAN NOTE
-Discussed options of [facility]-Intensive Lifestyle Intervention Program, Very Low Calorie Diet-VLCD and Conservative Program and the role of weight loss medications. She may be interested in surgery but does not qualify. If does have sleep apnea this could be an option  -Initial weight loss goal of 5-10% weight loss for improved health  -labs and records reviewed prior to visit today  - STOP BANG-upcoming sleep study    -Patient is interested in pursuing conservative plan  -Calorie goals (1300), sample menu ([id number]), portion size guidelines, and food logging reviewed with the patient. Goals:  -Food log (ie.) [email or web address],[email or web address],loseit.com,[email or web address]. [email or web address],etc-1300  -discussed trying to stay on a similar schedule and to distribute calories more evenly throughout the day  -No sugary beverages-recommend decreasing alcohol intake. At least 64oz of water daily. -recommend getting a step tracker and making a daily goal    Discussed medication options. She was supposed to start on wegovy but due to supply has not started it. Frenchell Solomon would be next best choice however she is not using any contraception and maybe hoping to get pregnant in the future. Discussed about possible metformin instead unless deciding to start contraception.       Initial Weight:228.4  Goal Weight:130lb

## 2023-07-07 NOTE — PATIENT INSTRUCTIONS
Recommend food logging with 1300 calorie goal and at least 60 gm protein.   Try to drink at least 4 bottles of water a day or 64 oz

## 2023-07-07 NOTE — PROGRESS NOTES
Assessment/Plan:    Obesity, Class II, BMI 35-39.9  -Discussed options of [facility]-Intensive Lifestyle Intervention Program, Very Low Calorie Diet-VLCD and Conservative Program and the role of weight loss medications. She may be interested in surgery but does not qualify. If does have sleep apnea this could be an option  -Initial weight loss goal of 5-10% weight loss for improved health  -labs and records reviewed prior to visit today  - STOP BANG-upcoming sleep study    -Patient is interested in pursuing conservative plan  -Calorie goals (1300), sample menu ([id number]), portion size guidelines, and food logging reviewed with the patient. Goals:  -Food log (ie.) www.[employer].com,[employer]. [employer].com,[employer]. com,etc-1300  -discussed trying to stay on a similar schedule and to distribute calories more evenly throughout the day  -No sugary beverages-recommend decreasing alcohol intake. At least 64oz of water daily. -recommend getting a step tracker and making a daily goal    Discussed medication options. She was supposed to start on wegovy but due to supply has not started it. Vernida Slates would be next best choice however she is not using any contraception and maybe hoping to get pregnant in the future. Discussed about possible metformin instead unless deciding to start contraception. Initial Weight:228.4  Goal Weight:130lb          I have spent a total time of 42 minutes on 07/07/23 in caring for this patient including Risks and benefits of tx options, Instructions for management, Patient and family education, Importance of tx compliance, Risk factor reductions, Impressions, Documenting in the medical record, Reviewing / ordering tests, medicine, procedures   and Obtaining or reviewing history  . Follow up in approximately 2 month nurse visit and 4 month with Non-Surgical Physician/Advanced Practitioner.       Diagnoses and all orders for this visit:    Class 2 obesity due to excess calories without serious comorbidity with body mass index (BMI) of 37.0 to 37.9 in adult  -     Ambulatory Referral to Weight Management  -     metFORMIN (GLUCOPHAGE-XR) 750 mg 24 hr tablet; Take 1 tablet (750 mg total) by mouth daily with breakfast    Well adult exam  -     Ambulatory Referral to Weight Management          Subjective:   Chief Complaint   Patient presents with   • Consult     MWM consult ; Gw-130lb ; waist-41in ; Sleep study in September this year. Patient ID: Marybeth Yao  is a 26 y.o. female with excess weight/obesity here to pursue weight management. Past Medical History:   Diagnosis Date   • Asthma    • Chlamydia 2022   • Depression    • GERD (gastroesophageal reflux disease)        HPI: Here for MWM consult    She typically does not eat much in the day and then feels more hungry at night. She got approved for wegovy but has not been able to get started due to supply shortage. She has tried to make more cooking at home and increased vegetable intake    Obesity/Excess Weight:  Severity: class II  Onset:  Whole life but gained about 80-90 lb in a year. 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   She   Patient Active Problem List    Diagnosis Date Noted   • Obesity, Class II, BMI 35-39.9 08/17/2013     She  has a past surgical history that includes No past surgeries. Her family history includes Cancer in her father; Hodgkin's lymphoma in her father; Thyroid disease in her mother. She  reports that she has never smoked. She has never used smokeless tobacco. She reports current alcohol use. She reports current drug use. Frequency: 7.00 times per week. Drug: Marijuana. Current Outpatient Medications   Medication Sig Dispense Refill   • metFORMIN (GLUCOPHAGE-XR) 750 mg 24 hr tablet Take 1 tablet (750 mg total) by mouth daily with breakfast 30 tablet 2   • omeprazole (PriLOSEC) 40 MG capsule Take 40 mg by mouth every morning       No current facility-administered medications for this visit. Current Outpatient Medications on File Prior to Visit   Medication Sig   • omeprazole (PriLOSEC) 40 MG capsule Take 40 mg by mouth every morning   • [DISCONTINUED] multivitamin (THERAGRAN) TABS Take 1 tablet by mouth daily     No current facility-administered medications on file prior to visit. She has No Known Allergies. .    Review of Systems   Constitutional: Positive for fatigue. Negative for fever. Respiratory: Negative for shortness of breath. Cardiovascular: Negative for chest pain and palpitations. Gastrointestinal: Negative for abdominal pain, constipation, diarrhea and vomiting.        +gerd   Genitourinary: Negative for difficulty urinating. Musculoskeletal: Negative for arthralgias and back pain. Skin: Negative for rash. Neurological: Negative for headaches. Psychiatric/Behavioral: Positive for sleep disturbance. Negative for dysphoric mood. The patient is not nervous/anxious. Objective:    /77   Pulse 86   Resp 16   Ht 5' 6" (1.676 m)   Wt 104 kg (228 lb 6.4 oz)   BMI 36.86 kg/m²     Physical Exam  Vitals and nursing note reviewed. Constitutional:       General: She is not in acute distress. Appearance: She is well-developed. She is obese.    HENT: Head: Normocephalic and atraumatic. Eyes:      Conjunctiva/sclera: Conjunctivae normal.   Neck:      Thyroid: No thyromegaly. Pulmonary:      Effort: Pulmonary effort is normal. No respiratory distress. Skin:     Findings: No rash (visible). Neurological:      Mental Status: She is alert and oriented to person, place, and time.    Psychiatric:         Mood and Affect: Mood normal.         Behavior: Behavior normal.

## 2023-07-19 ENCOUNTER — TELEPHONE (OUTPATIENT)
Dept: BARIATRICS | Facility: CLINIC | Age: 23
End: 2023-07-19

## 2023-07-19 DIAGNOSIS — E66.9 OBESITY, CLASS II, BMI 35-39.9: Primary | ICD-10-CM

## 2023-07-19 NOTE — TELEPHONE ENCOUNTER
Patient and pharm was informed regarding the Colin Cuellar approval from 07/19/2023-11/19/2023. She was also informed about the 4-5% weight loss requirement for renewal purposes.

## 2023-07-25 ENCOUNTER — HOSPITAL ENCOUNTER (EMERGENCY)
Facility: HOSPITAL | Age: 23
Discharge: HOME | End: 2023-07-25
Attending: EMERGENCY MEDICINE | Admitting: EMERGENCY MEDICINE
Payer: COMMERCIAL

## 2023-07-25 VITALS
WEIGHT: 223.11 LBS | OXYGEN SATURATION: 100 % | TEMPERATURE: 98.1 F | BODY MASS INDEX: 36.01 KG/M2 | RESPIRATION RATE: 18 BRPM | DIASTOLIC BLOOD PRESSURE: 59 MMHG | HEART RATE: 66 BPM | SYSTOLIC BLOOD PRESSURE: 101 MMHG

## 2023-07-25 DIAGNOSIS — R10.84 GENERALIZED ABDOMINAL PAIN: Primary | ICD-10-CM

## 2023-07-25 DIAGNOSIS — A08.4 VIRAL GASTROENTERITIS: ICD-10-CM

## 2023-07-25 LAB
ALBUMIN SERPL BCP-MCNC: 4.2 G/DL (ref 3.5–5)
ALP SERPL-CCNC: 76 U/L (ref 34–104)
ALT SERPL W P-5'-P-CCNC: 10 U/L (ref 7–52)
ANION GAP SERPL CALCULATED.3IONS-SCNC: 4 MMOL/L
AST SERPL W P-5'-P-CCNC: 11 U/L (ref 13–39)
BACTERIA UR QL AUTO: ABNORMAL /HPF
BASOPHILS # BLD AUTO: 0.03 THOUSANDS/ÂΜL (ref 0–0.1)
BASOPHILS NFR BLD AUTO: 0 % (ref 0–1)
BILIRUB SERPL-MCNC: 0.39 MG/DL (ref 0.2–1)
BILIRUB UR QL STRIP: NEGATIVE
BUN SERPL-MCNC: 17 MG/DL (ref 5–25)
CALCIUM SERPL-MCNC: 9.1 MG/DL (ref 8.4–10.2)
CHLORIDE SERPL-SCNC: 105 MMOL/L (ref 96–108)
CLARITY UR: CLEAR
CO2 SERPL-SCNC: 27 MMOL/L (ref 21–32)
COLOR UR: YELLOW
CREAT SERPL-MCNC: 0.73 MG/DL (ref 0.6–1.3)
EOSINOPHIL # BLD AUTO: 0.33 THOUSAND/ÂΜL (ref 0–0.61)
EOSINOPHIL NFR BLD AUTO: 3 % (ref 0–6)
ERYTHROCYTE [DISTWIDTH] IN BLOOD BY AUTOMATED COUNT: 14.3 % (ref 11.6–15.1)
EXT PREGNANCY TEST URINE: NEGATIVE
EXT. CONTROL: NORMAL
GFR SERPL CREATININE-BSD FRML MDRD: 118 ML/MIN/1.73SQ M
GLUCOSE SERPL-MCNC: 100 MG/DL (ref 65–140)
GLUCOSE UR STRIP-MCNC: NEGATIVE MG/DL
HCT VFR BLD AUTO: 38.6 % (ref 34.8–46.1)
HGB BLD-MCNC: 12.5 G/DL (ref 11.5–15.4)
HGB UR QL STRIP.AUTO: NEGATIVE
IMM GRANULOCYTES # BLD AUTO: 0.06 THOUSAND/UL (ref 0–0.2)
IMM GRANULOCYTES NFR BLD AUTO: 1 % (ref 0–2)
KETONES UR STRIP-MCNC: NEGATIVE MG/DL
LEUKOCYTE ESTERASE UR QL STRIP: NEGATIVE
LIPASE SERPL-CCNC: 23 U/L (ref 11–82)
LYMPHOCYTES # BLD AUTO: 2.05 THOUSANDS/ÂΜL (ref 0.6–4.47)
LYMPHOCYTES NFR BLD AUTO: 17 % (ref 14–44)
MCH RBC QN AUTO: 27.9 PG (ref 26.8–34.3)
MCHC RBC AUTO-ENTMCNC: 32.4 G/DL (ref 31.4–37.4)
MCV RBC AUTO: 86 FL (ref 82–98)
MONOCYTES # BLD AUTO: 0.68 THOUSAND/ÂΜL (ref 0.17–1.22)
MONOCYTES NFR BLD AUTO: 6 % (ref 4–12)
MUCOUS THREADS UR QL AUTO: ABNORMAL
NEUTROPHILS # BLD AUTO: 8.61 THOUSANDS/ÂΜL (ref 1.85–7.62)
NEUTS SEG NFR BLD AUTO: 73 % (ref 43–75)
NITRITE UR QL STRIP: NEGATIVE
NON-SQ EPI CELLS URNS QL MICRO: ABNORMAL /HPF
NRBC BLD AUTO-RTO: 0 /100 WBCS
PH UR STRIP.AUTO: 6.5 [PH] (ref 4.5–8)
PLATELET # BLD AUTO: 382 THOUSANDS/UL (ref 149–390)
PMV BLD AUTO: 9.3 FL (ref 8.9–12.7)
POTASSIUM SERPL-SCNC: 4 MMOL/L (ref 3.5–5.3)
PROT SERPL-MCNC: 7.1 G/DL (ref 6.4–8.4)
PROT UR STRIP-MCNC: ABNORMAL MG/DL
RBC # BLD AUTO: 4.48 MILLION/UL (ref 3.81–5.12)
RBC #/AREA URNS AUTO: ABNORMAL /HPF
SODIUM SERPL-SCNC: 136 MMOL/L (ref 135–147)
SP GR UR STRIP.AUTO: >=1.03 (ref 1–1.03)
UROBILINOGEN UR QL STRIP.AUTO: 0.2 E.U./DL
WBC # BLD AUTO: 11.76 THOUSAND/UL (ref 4.31–10.16)
WBC #/AREA URNS AUTO: ABNORMAL /HPF

## 2023-07-25 PROCEDURE — 83690 ASSAY OF LIPASE: CPT

## 2023-07-25 PROCEDURE — 80053 COMPREHEN METABOLIC PANEL: CPT

## 2023-07-25 PROCEDURE — 81001 URINALYSIS AUTO W/SCOPE: CPT

## 2023-07-25 PROCEDURE — 85025 COMPLETE CBC W/AUTO DIFF WBC: CPT

## 2023-07-25 PROCEDURE — 81025 URINE PREGNANCY TEST: CPT

## 2023-07-25 PROCEDURE — 36415 COLL VENOUS BLD VENIPUNCTURE: CPT

## 2023-07-25 RX ORDER — KETOROLAC TROMETHAMINE 30 MG/ML
15 INJECTION, SOLUTION INTRAMUSCULAR; INTRAVENOUS ONCE
Status: COMPLETED | OUTPATIENT
Start: 2023-07-25 | End: 2023-07-25

## 2023-07-25 RX ORDER — ONDANSETRON 2 MG/ML
4 INJECTION INTRAMUSCULAR; INTRAVENOUS ONCE
Status: COMPLETED | OUTPATIENT
Start: 2023-07-25 | End: 2023-07-25

## 2023-07-25 RX ORDER — ONDANSETRON 4 MG/1
4 TABLET, FILM COATED ORAL EVERY 6 HOURS
Qty: 12 TABLET | Refills: 0 | Status: SHIPPED | OUTPATIENT
Start: 2023-07-25

## 2023-07-25 RX ORDER — METOCLOPRAMIDE HYDROCHLORIDE 5 MG/ML
10 INJECTION INTRAMUSCULAR; INTRAVENOUS ONCE
Status: COMPLETED | OUTPATIENT
Start: 2023-07-25 | End: 2023-07-25

## 2023-07-25 RX ORDER — ACETAMINOPHEN 325 MG/1
975 TABLET ORAL ONCE
Status: COMPLETED | OUTPATIENT
Start: 2023-07-25 | End: 2023-07-25

## 2023-07-25 RX ADMIN — METOCLOPRAMIDE 10 MG: 5 INJECTION, SOLUTION INTRAMUSCULAR; INTRAVENOUS at 09:32

## 2023-07-25 RX ADMIN — KETOROLAC TROMETHAMINE 15 MG: 30 INJECTION, SOLUTION INTRAMUSCULAR; INTRAVENOUS at 08:04

## 2023-07-25 RX ADMIN — ONDANSETRON 4 MG: 2 INJECTION INTRAMUSCULAR; INTRAVENOUS at 08:04

## 2023-07-25 RX ADMIN — ACETAMINOPHEN 325MG 975 MG: 325 TABLET ORAL at 09:31

## 2023-07-25 RX ADMIN — SODIUM CHLORIDE 1000 ML: 0.9 INJECTION, SOLUTION INTRAVENOUS at 08:01

## 2023-07-25 NOTE — ED PROVIDER NOTES
History  Chief Complaint   Patient presents with   • Abdominal Pain     Patient c/o diarrhea, vomiting, abdominal pain that began last night. Patient describes pain in stomach as cramping. Patient is a 30-year-old female presenting for evaluation of generalized abdominal pain, diarrhea, vomiting since last night. States she has had greater than 10 episodes of both diarrhea and vomiting, all nonbloody. Denies dysuria, hematuria, abnormal vaginal discharge or bleeding. No fevers, URI symptoms, chest pain, shortness of breath. No prior abdominal surgeries. No known sick contacts. No medications prior to arrival.      Abdominal Pain  Associated symptoms: diarrhea, nausea and vomiting    Associated symptoms: no chest pain, no chills, no cough, no dysuria, no fever, no hematuria, no shortness of breath and no sore throat        Prior to Admission Medications   Prescriptions Last Dose Informant Patient Reported? Taking? Insulin Pen Needle 32G X 4 MM MISC   No No   Sig: Use daily   liraglutide (SAXENDA) injection   No No   Sig: Inject subcutaneously WEEK 1 use 0.6mg day,  WEEK 2 use 1.2mg day, WEEK 3 use 1.8mg day, WEEK 4 use 2.4mg day, WEEK 5 use 3mg day   omeprazole (PriLOSEC) 40 MG capsule  Self Yes No   Sig: Take 40 mg by mouth every morning      Facility-Administered Medications: None       Past Medical History:   Diagnosis Date   • Asthma    • Chlamydia 2022   • Depression    • GERD (gastroesophageal reflux disease)        Past Surgical History:   Procedure Laterality Date   • NO PAST SURGERIES         Family History   Problem Relation Age of Onset   • Thyroid disease Mother    • Cancer Father         Hodgkin's   • Hodgkin's lymphoma Father    • Breast cancer Neg Hx    • Colon cancer Neg Hx    • Ovarian cancer Neg Hx      I have reviewed and agree with the history as documented.     E-Cigarette/Vaping   • E-Cigarette Use Current Some Day User      E-Cigarette/Vaping Substances   • Nicotine Yes    • THC No • CBD No    • Flavoring Yes      Social History     Tobacco Use   • Smoking status: Never   • Smokeless tobacco: Never   • Tobacco comments:     Vape occ   Vaping Use   • Vaping Use: Some days   • Substances: Nicotine, Flavoring   Substance Use Topics   • Alcohol use: Yes     Comment: couple times/month   • Drug use: Yes     Frequency: 7.0 times per week     Types: Marijuana     Comment: daily marijuana use       Review of Systems   Constitutional: Negative for chills and fever. HENT: Negative for ear pain and sore throat. Eyes: Negative for pain and visual disturbance. Respiratory: Negative for cough and shortness of breath. Cardiovascular: Negative for chest pain and palpitations. Gastrointestinal: Positive for abdominal pain, diarrhea, nausea and vomiting. Genitourinary: Negative for dysuria and hematuria. Musculoskeletal: Negative for arthralgias and back pain. Skin: Negative for color change and rash. Neurological: Negative for seizures and syncope. All other systems reviewed and are negative. Physical Exam  Physical Exam  Vitals and nursing note reviewed. Constitutional:       General: She is not in acute distress. Appearance: Normal appearance. She is not ill-appearing. HENT:      Head: Normocephalic and atraumatic. Right Ear: External ear normal.      Left Ear: External ear normal.      Nose: Nose normal.      Mouth/Throat:      Mouth: Mucous membranes are moist.   Eyes:      General: No scleral icterus. Right eye: No discharge. Left eye: No discharge. Extraocular Movements: Extraocular movements intact. Cardiovascular:      Rate and Rhythm: Normal rate and regular rhythm. Pulses: Normal pulses. Heart sounds: Normal heart sounds. Pulmonary:      Effort: Pulmonary effort is normal. No respiratory distress. Breath sounds: Normal breath sounds. Abdominal:      Palpations: Abdomen is soft. Tenderness:  There is generalized abdominal tenderness. There is no right CVA tenderness, left CVA tenderness, guarding or rebound. Musculoskeletal:         General: No tenderness, deformity or signs of injury. Cervical back: Normal range of motion and neck supple. Skin:     General: Skin is dry. Coloration: Skin is not jaundiced. Findings: No erythema or rash. Neurological:      General: No focal deficit present. Mental Status: She is alert and oriented to person, place, and time. Mental status is at baseline. Motor: No weakness. Gait: Gait normal.   Psychiatric:         Mood and Affect: Mood normal.         Behavior: Behavior normal.         Thought Content:  Thought content normal.         Vital Signs  ED Triage Vitals   Temperature Pulse Respirations Blood Pressure SpO2   07/25/23 0714 07/25/23 0735 07/25/23 0735 07/25/23 0735 07/25/23 0735   98.1 °F (36.7 °C) 89 18 119/58 99 %      Temp Source Heart Rate Source Patient Position - Orthostatic VS BP Location FiO2 (%)   07/25/23 0714 07/25/23 0735 07/25/23 0735 07/25/23 0735 --   Oral Monitor Lying Right arm       Pain Score       07/25/23 0735       8           Vitals:    07/25/23 0735 07/25/23 0938   BP: 119/58 101/59   Pulse: 89 66   Patient Position - Orthostatic VS: Lying Sitting         Visual Acuity      ED Medications  Medications   sodium chloride 0.9 % bolus 1,000 mL (0 mL Intravenous Stopped 7/25/23 0936)   ketorolac (TORADOL) injection 15 mg (15 mg Intravenous Given 7/25/23 0804)   ondansetron (ZOFRAN) injection 4 mg (4 mg Intravenous Given 7/25/23 0804)   acetaminophen (TYLENOL) tablet 975 mg (975 mg Oral Given 7/25/23 0931)   metoclopramide (REGLAN) injection 10 mg (10 mg Intravenous Given 7/25/23 0932)       Diagnostic Studies  Results Reviewed     Procedure Component Value Units Date/Time    Comprehensive metabolic panel [[id number]]  (Abnormal) Collected: 07/25/23 0750    Lab Status: Final result Specimen: Blood from Arm, Left Updated: 07/25/23 0848     Sodium 136 mmol/L      Potassium 4.0 mmol/L      Chloride 105 mmol/L      CO2 27 mmol/L      ANION GAP 4 mmol/L      BUN 17 mg/dL      Creatinine 0.73 mg/dL      Glucose 100 mg/dL      Calcium 9.1 mg/dL      AST 11 U/L      ALT 10 U/L      Alkaline Phosphatase 76 U/L      Total Protein 7.1 g/dL      Albumin 4.2 g/dL      Total Bilirubin 0.39 mg/dL      eGFR 118 ml/min/1.73sq m     Narrative:      National Kidney Disease Foundation guidelines for Chronic Kidney Disease (CKD):   •  Stage 1 with normal or high GFR (GFR > 90 mL/min/1.73 square meters)  •  Stage 2 Mild CKD (GFR = 60-89 mL/min/1.73 square meters)  •  Stage 3A Moderate CKD (GFR = 45-59 mL/min/1.73 square meters)  •  Stage 3B Moderate CKD (GFR = 30-44 mL/min/1.73 square meters)  •  Stage 4 Severe CKD (GFR = 15-29 mL/min/1.73 square meters)  •  Stage 5 End Stage CKD (GFR <15 mL/min/1.73 square meters)  Note: GFR calculation is accurate only with a steady state creatinine    Lipase [[id number]]  (Normal) Collected: 07/25/23 0758    Lab Status: Final result Specimen: Blood from Arm, Left Updated: 07/25/23 0848     Lipase 23 u/L     Urine Microscopic [[id number]]  (Abnormal) Collected: 07/25/23 0739    Lab Status: Final result Specimen: Urine, Clean Catch Updated: 07/25/23 0838     RBC, UA 1-2 /hpf      WBC, UA 4-10 /hpf      Epithelial Cells Moderate /hpf      Bacteria, UA Occasional /hpf      MUCUS THREADS Innumerable    CBC and differential [[id number]]  (Abnormal) Collected: 07/25/23 0758    Lab Status: Final result Specimen: Blood from Arm, Left Updated: 07/25/23 0819     WBC 11.76 Thousand/uL      RBC 4.48 Million/uL      Hemoglobin 12.5 g/dL      Hematocrit 38.6 %      MCV 86 fL      MCH 27.9 pg      MCHC 32.4 g/dL      RDW 14.3 %      MPV 9.3 fL      Platelets 297 Thousands/uL      nRBC 0 /100 WBCs      Neutrophils Relative 73 %      Immat GRANS % 1 %      Lymphocytes Relative 17 %      Monocytes Relative 6 %      Eosinophils Relative 3 % Basophils Relative 0 %      Neutrophils Absolute 8.61 Thousands/µL      Immature Grans Absolute 0.06 Thousand/uL      Lymphocytes Absolute 2.05 Thousands/µL      Monocytes Absolute 0.68 Thousand/µL      Eosinophils Absolute 0.33 Thousand/µL      Basophils Absolute 0.03 Thousands/µL     POCT pregnancy, urine [[id number]]  (Normal) Resulted: 07/25/23 0743    Lab Status: Final result Updated: 07/25/23 0743     EXT Preg Test, Ur Negative     Control Valid    Urine Macroscopic, POC [[id number]]  (Abnormal) Collected: 07/25/23 0739    Lab Status: Final result Specimen: Urine Updated: 07/25/23 0741     Color, UA Yellow     Clarity, UA Clear     pH, UA 6.5     Leukocytes, UA Negative     Nitrite, UA Negative     Protein,  (2+) mg/dl      Glucose, UA Negative mg/dl      Ketones, UA Negative mg/dl      Urobilinogen, UA 0.2 E.U./dl      Bilirubin, UA Negative     Occult Blood, UA Negative     Specific Gravity, UA >=1.030    Narrative:      CLINITEK RESULT                 No orders to display              Procedures  Procedures         ED Course  ED Course as of 07/28/23 0733   Tue Jul 25, 2023   0920 Pt initially felt improvement but now having generalized cramping and nausea again. Will give reglan and tylenol while fluids finish. Medical Decision Making  Patient is a 73-year-old female presenting for evaluation of generalized abdominal cramping, nausea, vomiting, diarrhea for 1 day. No fevers or other associated symptoms. All vitals are within normal limits on arrival in the ED. Physical exam is remarkable for generalized abdominal tenderness without rebound or guarding. Suspect viral gastroenteritis. Other DDx including cystitis, pyelonephritis, diverticulitis, pancreatitis. Doubt surgical abdomen such as appendicitis, cholecystitis. Will check basic abdominal labs including CBC, CMP, lipase, UA, urine pregnancy.   Will treat symptomatically with Reglan, Toradol, Tylenol, Zofran and reevaluate. Given that abdominal pain is nonfocal, no imaging indicated at this time. Mild leukocytosis at 11.6. Otherwise negative for anemia, electrolyte abnormalities, biliary derangements, elevated LFTs. Lipase normal.  Urine pregnancy negative. UA negative. Patient reports improvement in symptoms with medications of fluids. Repeat exam is benign so I did not imaging indicated. Patient be discharged home with supportive care. Prescribed nausea medicine. Instructed take ibuprofen and Tylenol as needed for pain. I have discussed findings and plan for discharge with the patient/caregiver. Follow up with the appropriate providers including primary care physician was discussed. Return precautions discussed with patient/caregiver as outlined in AVS. Patient/caregiver verbally expressed understanding. Patient stable at time of discharge and ambulated out of the emergency department. Generalized abdominal pain: acute illness or injury  Viral gastroenteritis: acute illness or injury  Amount and/or Complexity of Data Reviewed  Labs: ordered. Risk  OTC drugs. Prescription drug management.           Disposition  Final diagnoses:   Generalized abdominal pain   Viral gastroenteritis     Time reflects when diagnosis was documented in both MDM as applicable and the Disposition within this note     Time User Action Codes Description Comment    7/25/2023  9:53 AM Maia Huntington Add [R10.84] Generalized abdominal pain     7/25/2023  9:53 AM Maia Huntington Add [R11.2] Nausea & vomiting     7/25/2023  9:53 AM Maia Huntington Add [R19.7] Diarrhea     7/25/2023  9:53 AM Maia Huntington Remove [R11.2] Nausea & vomiting     7/25/2023  9:53 AM Maia Huntington Remove [R19.7] Diarrhea     7/25/2023  9:53 AM Maia Huntington Add [A08.4] Viral gastroenteritis       ED Disposition     ED Disposition   Discharge    Condition   Stable    Date/Time   Tue Jul 25, 2023  9:55 AM    Comment   Leyla Tsang Julito Wahl discharge to home/self care. Follow-up Information     Follow up With Specialties Details Why Contact Bo Sotelo  Schedule an appointment as soon as possible for a visit   [address] [place] [id number] [phone]            Discharge Medication List as of 7/25/2023  9:55 AM      START taking these medications    Details   ondansetron (ZOFRAN) 4 mg tablet Take 1 tablet (4 mg total) by mouth every 6 (six) hours, Starting Tue 7/25/2023, Normal         CONTINUE these medications which have NOT CHANGED    Details   omeprazole (PriLOSEC) 40 MG capsule Take 40 mg by mouth every morning, Starting Fri 5/5/2023, Historical Med      Insulin Pen Needle 32G X 4 MM MISC Use daily, Starting Wed 7/19/2023, Normal      liraglutide (SAXENDA) injection Inject subcutaneously WEEK 1 use 0.6mg day,  WEEK 2 use 1.2mg day, WEEK 3 use 1.8mg day, WEEK 4 use 2.4mg day, WEEK 5 use 3mg day, Normal             No discharge procedures on file.     PDMP Review     None          ED Provider  Electronically Signed by           Jose Jean-Baptiste PA-C  07/28/23 8823

## 2023-07-25 NOTE — Clinical Note
Ayleen Calloway was seen and treated in our emergency department on 7/25/2023. Diagnosis:     Marlyn Nogueira  may return to work on return date. She may return on this date: 07/27/2023         If you have any questions or concerns, please don't hesitate to call.       Nesha Meyers PA-C    ______________________________           _______________          _______________  Hospital Representative                              Date                                Time

## 2023-07-25 NOTE — DISCHARGE INSTRUCTIONS
You likely have a viral gastroenteritis. Use zofran as needed for nausea. Can take ibuprofen and tylenol for pain. Drink plenty of fluids. Return to ED if symptoms worsen. Follow up with PCP.

## 2023-07-25 NOTE — Clinical Note
accompanied Daniele Echeverria to the emergency department on 7/25/2023. Return date if applicable: 00/91/4578        If you have any questions or concerns, please don't hesitate to call.       Carey Victoria PA-C

## 2023-09-05 DIAGNOSIS — E66.9 OBESITY, CLASS II, BMI 35-39.9: ICD-10-CM

## 2023-09-06 DIAGNOSIS — E66.9 OBESITY, CLASS II, BMI 35-39.9: ICD-10-CM

## 2023-09-06 RX ORDER — LIRAGLUTIDE 6 MG/ML
INJECTION, SOLUTION SUBCUTANEOUS
Refills: 1 | OUTPATIENT
Start: 2023-09-06

## 2023-09-06 NOTE — TELEPHONE ENCOUNTER
Shonna is on backorder. I know prior she was trying to find wegovy and had no luck.   I would recommend holding off on the saxenda and hopefully the pharmacy will have more in soon

## 2023-09-07 ENCOUNTER — TELEPHONE (OUTPATIENT)
Dept: BARIATRICS | Facility: CLINIC | Age: 23
End: 2023-09-07

## 2023-09-08 RX ORDER — LIRAGLUTIDE 6 MG/ML
INJECTION, SOLUTION SUBCUTANEOUS
Refills: 1 | OUTPATIENT
Start: 2023-09-08

## 2023-10-12 DIAGNOSIS — E66.9 OBESITY, CLASS II, BMI 35-39.9: Primary | ICD-10-CM

## 2023-10-12 RX ORDER — NALTREXONE HYDROCHLORIDE 50 MG/1
TABLET, FILM COATED ORAL
Qty: 30 TABLET | Refills: 1 | Status: SHIPPED | OUTPATIENT
Start: 2023-10-12 | End: 2023-11-22

## 2023-10-12 RX ORDER — BUPROPION HYDROCHLORIDE 150 MG/1
150 TABLET, EXTENDED RELEASE ORAL 2 TIMES DAILY
Qty: 60 TABLET | Refills: 1 | Status: SHIPPED | OUTPATIENT
Start: 2023-10-12 | End: 2023-11-06

## 2023-11-06 DIAGNOSIS — E66.9 OBESITY, CLASS II, BMI 35-39.9: ICD-10-CM

## 2023-11-06 RX ORDER — BUPROPION HYDROCHLORIDE 150 MG/1
150 TABLET, EXTENDED RELEASE ORAL 2 TIMES DAILY
Qty: 180 TABLET | Refills: 0 | Status: SHIPPED | OUTPATIENT
Start: 2023-11-06

## 2023-11-08 ENCOUNTER — HOSPITAL ENCOUNTER (EMERGENCY)
Facility: HOSPITAL | Age: 23
Discharge: HOME | End: 2023-11-08
Attending: EMERGENCY MEDICINE
Payer: COMMERCIAL

## 2023-11-08 VITALS
RESPIRATION RATE: 18 BRPM | TEMPERATURE: 98 F | WEIGHT: 219.14 LBS | SYSTOLIC BLOOD PRESSURE: 121 MMHG | HEART RATE: 68 BPM | DIASTOLIC BLOOD PRESSURE: 71 MMHG | BODY MASS INDEX: 35.37 KG/M2 | OXYGEN SATURATION: 97 %

## 2023-11-08 DIAGNOSIS — R05.1 ACUTE COUGH: Primary | ICD-10-CM

## 2023-11-08 LAB
FLUAV RNA RESP QL NAA+PROBE: NEGATIVE
FLUBV RNA RESP QL NAA+PROBE: NEGATIVE
RSV RNA RESP QL NAA+PROBE: NEGATIVE
S PYO DNA THROAT QL NAA+PROBE: NOT DETECTED
SARS-COV-2 RNA RESP QL NAA+PROBE: NEGATIVE

## 2023-11-08 PROCEDURE — 99283 EMERGENCY DEPT VISIT LOW MDM: CPT

## 2023-11-08 PROCEDURE — 0241U HB NFCT DS VIR RESP RNA 4 TRGT: CPT

## 2023-11-08 PROCEDURE — 99284 EMERGENCY DEPT VISIT MOD MDM: CPT

## 2023-11-08 PROCEDURE — 87651 STREP A DNA AMP PROBE: CPT

## 2023-11-08 RX ORDER — ALBUTEROL SULFATE 90 UG/1
2 AEROSOL, METERED RESPIRATORY (INHALATION) ONCE
Status: COMPLETED | OUTPATIENT
Start: 2023-11-08 | End: 2023-11-08

## 2023-11-08 RX ORDER — ACETAMINOPHEN 500 MG
500 TABLET ORAL EVERY 6 HOURS PRN
Qty: 30 TABLET | Refills: 0 | Status: SHIPPED | OUTPATIENT
Start: 2023-11-08

## 2023-11-08 RX ADMIN — ALBUTEROL SULFATE 2 PUFF: 90 AEROSOL, METERED RESPIRATORY (INHALATION) at 14:17

## 2023-11-08 NOTE — Clinical Note
Emely Harris was seen and treated in our emergency department on 11/8/2023. Diagnosis:     Shelli  . She may return on this date: 11/10/2023         If you have any questions or concerns, please don't hesitate to call.       Nikolai Hughes PA-C    ______________________________           _______________          _______________  Hospital Representative                              Date                                Time

## 2023-11-08 NOTE — Clinical Note
Duncan Zamora was seen and treated in our emergency department on 11/8/2023. Diagnosis:     Shelli  . She may return on this date: 11/09/2023         If you have any questions or concerns, please don't hesitate to call.       Tom Lepe PA-C    ______________________________           _______________          _______________  Hospital Representative                              Date                                Time

## 2023-11-08 NOTE — Clinical Note
Devi Gonzales was seen and treated in our emergency department on 11/8/2023. Diagnosis:     Shelli  . She may return on this date: 11/10/2023         If you have any questions or concerns, please don't hesitate to call.       Jaja Lee PA-C    ______________________________           _______________          _______________  Hospital Representative                              Date                                Time

## 2023-11-08 NOTE — DISCHARGE INSTRUCTIONS
Take Mucinex as prescribed for cough and congestion. Use Tylenol for fevers/ body aches. Rest, and increase fluid intake. Follow- up with family provider.

## 2023-11-08 NOTE — Clinical Note
Marily Slater was seen and treated in our emergency department on 11/8/2023. Diagnosis:     Shelli  . She may return on this date: 11/09/2023         If you have any questions or concerns, please don't hesitate to call.       Meredith Allred PA-C    ______________________________           _______________          _______________  Hospital Representative                              Date                                Time

## 2023-11-08 NOTE — ED PROVIDER NOTES
History  Chief Complaint   Patient presents with    Cough     Pt reports a cough, congestion,and sore throat since yesterday unsure of fevers      Patient is a 25 y/o female with hx of childhood asthma presenting to the ER with a 1 day hx of cough, congestion, and headaches. She is unaware of any fevers. She feels the congestion in her nose and ears. She reports decreased hearing due to the congestion. She tried Nyquil last night, which did not improve her symptoms. 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 Cough  Associated symptoms: headaches, shortness of breath and sore throat    Associated symptoms: no chest pain, no chills, no diaphoresis, no ear pain, no eye discharge, no fever, no myalgias, no rash and no wheezing        Prior to Admission Medications   Prescriptions Last Dose Informant Patient Reported? Taking?    Insulin Pen Needle 32G X 4 MM MISC   No No   Sig: Use daily   buPROPion (WELLBUTRIN SR) 150 mg 12 hr tablet   No No   Sig: TAKE 1 TABLET BY MOUTH TWICE A DAY   liraglutide (SAXENDA) injection   No No   Sig: Inject subcutaneously WEEK 1 use 0.6mg day,  WEEK 2 use 1.2mg day, WEEK 3 use 1.8mg day, WEEK 4 use 2.4mg day, WEEK 5 use 3mg day   naltrexone (REVIA) 50 mg tablet   No No   Sig: Take 1/2 tablet for 2 weeks then take 1 tablet a day   omeprazole (PriLOSEC) 40 MG capsule  Self Yes No   Sig: Take 40 mg by mouth every morning   ondansetron (ZOFRAN) 4 mg tablet   No No   Sig: Take 1 tablet (4 mg total) by mouth every 6 (six) hours      Facility-Administered Medications: None       Past Medical History:   Diagnosis Date    Asthma     Chlamydia 2022    Depression     GERD (gastroesophageal reflux disease)        Past Surgical History:   Procedure Laterality Date    NO PAST SURGERIES         Family History   Problem Relation Age of Onset    Thyroid disease Mother     Cancer Father         Hodgkin's    Hodgkin's lymphoma Father     Breast cancer Neg Hx     Colon cancer Neg Hx     Ovarian cancer Neg Hx      I have reviewed and agree with the history as documented. E-Cigarette/Vaping    E-Cigarette Use Current Some Day User      E-Cigarette/Vaping Substances    Nicotine Yes     THC No     CBD No     Flavoring Yes      Social History     Tobacco Use    Smoking status: Never    Smokeless tobacco: Never    Tobacco comments:     Vape occ   Vaping Use    Vaping Use: Some days    Substances: Nicotine, Flavoring   Substance Use Topics    Alcohol use: Yes     Comment: couple times/month    Drug use: Yes     Frequency: 7.0 times per week     Types: Marijuana     Comment: daily marijuana use       Review of Systems   Constitutional:  Positive for fatigue. Negative for appetite change, chills, diaphoresis and fever. HENT:  Positive for congestion, sinus pressure and sore throat. Negative for ear discharge, ear pain, sinus pain, trouble swallowing and voice change. Hearing loss: muffled from congestion. Eyes:  Negative for pain, discharge and visual disturbance. Respiratory:  Positive for cough and shortness of breath. Negative for apnea, choking, chest tightness and wheezing. Cardiovascular:  Negative for chest pain and palpitations. Gastrointestinal:  Negative for abdominal distention, abdominal pain, diarrhea, nausea and vomiting (No new vomiting: chronic). Genitourinary:  Negative for dysuria and hematuria. Musculoskeletal:  Negative for arthralgias, back pain and myalgias. Skin:  Negative for color change and rash. Neurological:  Positive for headaches. Negative for dizziness, seizures, syncope, weakness and light-headedness. All other systems reviewed and are negative. Physical Exam  Physical Exam  Vitals and nursing note reviewed. Constitutional:       General: She is not in acute distress. Appearance: She is well-developed. HENT:      Head: Normocephalic and atraumatic.       Right Ear: Tympanic membrane, ear canal and external ear normal. There is no impacted cerumen. Left Ear: Tympanic membrane, ear canal and external ear normal. There is no impacted cerumen. Nose: Congestion and rhinorrhea present. No nasal tenderness. Right Sinus: No maxillary sinus tenderness or frontal sinus tenderness. Left Sinus: No maxillary sinus tenderness or frontal sinus tenderness. Mouth/Throat:      Mouth: Mucous membranes are moist.      Dentition: Normal dentition. Pharynx: Posterior oropharyngeal erythema present. No oropharyngeal exudate. Tonsils: 3+ on the right. 3+ on the left. Eyes:      Conjunctiva/sclera: Conjunctivae normal.   Cardiovascular:      Rate and Rhythm: Normal rate and regular rhythm. Heart sounds: No murmur heard. Pulmonary:      Effort: Pulmonary effort is normal. No respiratory distress. Breath sounds: Normal breath sounds. Abdominal:      Palpations: Abdomen is soft. Tenderness: There is no abdominal tenderness. Musculoskeletal:         General: No swelling. Cervical back: Neck supple. Skin:     General: Skin is warm and dry. Capillary Refill: Capillary refill takes less than 2 seconds. Neurological:      Mental Status: She is alert.    Psychiatric:         Mood and Affect: Mood normal.         Vital Signs  ED Triage Vitals   Temperature Pulse Respirations Blood Pressure SpO2   11/08/23 1347 11/08/23 1348 11/08/23 1348 11/08/23 1348 11/08/23 1348   98 °F (36.7 °C) 68 18 121/71 97 %      Temp Source Heart Rate Source Patient Position - Orthostatic VS BP Location FiO2 (%)   11/08/23 1347 -- 11/08/23 1348 11/08/23 1348 --   Oral  Sitting Right arm       Pain Score       --                  Vitals:    11/08/23 1348   BP: 121/71   Pulse: 68   Patient Position - Orthostatic VS: Sitting         Visual Acuity      ED Medications  Medications   albuterol (PROVENTIL HFA,VENTOLIN HFA) inhaler 2 puff (2 puffs Inhalation Given 11/8/23 1417)       Diagnostic Studies  Results Reviewed Procedure Component Value Units Date/Time    FLU/RSV/COVID - if FLU/RSV clinically relevant [[id number]]  (Normal) Collected: 11/08/23 1415    Lab Status: Final result Specimen: Nares from Nose Updated: 11/08/23 1520     SARS-CoV-2 Negative     INFLUENZA A PCR Negative     INFLUENZA B PCR Negative     RSV PCR Negative    Narrative:      FOR PEDIATRIC PATIENTS - copy/paste COVID Guidelines URL to browser: [email or web address]    SARS-CoV-2 assay is a Nucleic Acid Amplification assay intended for the  qualitative detection of nucleic acid from SARS-CoV-2 in nasopharyngeal  swabs. Results are for the presumptive identification of SARS-CoV-2 RNA. Positive results are indicative of infection with SARS-CoV-2, the virus  causing COVID-19, but do not rule out bacterial infection or co-infection  with other viruses. Laboratories within the [facility] and its  territories are required to report all positive results to the appropriate  public health authorities. Negative results do not preclude SARS-CoV-2  infection and should not be used as the sole basis for treatment or other  patient management decisions. Negative results must be combined with  clinical observations, patient history, and epidemiological information. This test has not been FDA cleared or approved. This test has been authorized by FDA under an Emergency Use Authorization  (EUA). This test is only authorized for the duration of time the  declaration that circumstances exist justifying the authorization of the  emergency use of an in vitro diagnostic tests for detection of SARS-CoV-2  virus and/or diagnosis of COVID-19 infection under section 564(b)(1) of  the Act, 21 U. S.C. 296UTO-1(X)(1), unless the authorization is terminated  or revoked sooner. The test has been validated but independent review by FDA  and CLIA is pending. Test performed using [id number]:  This RT-PCR assay targets N2,  a region unique to SARS-CoV-2. A conserved region in the E-gene was chosen  for pan-Sarbecovirus detection which includes SARS-CoV-2. According to CMS-2020-01-R, this platform meets the definition of high-throughput technology. Strep A PCR [[id number]]  (Normal) Collected: 11/08/23 1415    Lab Status: Final result Specimen: Throat Updated: 11/08/23 1509     STREP A PCR Not Detected                   No orders to display              Procedures  Procedures         ED Course                               SBIRT 22yo+      Flowsheet Row Most Recent Value   Initial Alcohol Screen: US AUDIT-C     1. How often do you have a drink containing alcohol? 0 Filed at: 11/08/2023 1405   2. How many drinks containing alcohol do you have on a typical day you are drinking? 0 Filed at: 11/08/2023 1405   3b. FEMALE Any Age, or MALE 65+: How often do you have 4 or more drinks on one occassion? 0 Filed at: 11/08/2023 1405   Audit-C Score 0 Filed at: 11/08/2023 1405   CELESTINE: How many times in the past year have you. .. Used an illegal drug or used a prescription medication for non-medical reasons? Never Filed at: 11/08/2023 1405                      Medical Decision Making  Pt has cough, congestion, headaches, fatigue- ordered Covid, flu, RSV  Pt has a hx of asthma and no inhaler at home - albuterol inhaler given  On physical exam, pt has enlarged tonsils and erythema - ordered strep swab  Strep, covid, flu, rsv all negative - mucinex for symptomatic care of viral URI  Patient understands and agrees with plan. Amount and/or Complexity of Data Reviewed  Labs: ordered. Risk  OTC drugs. Prescription drug management.              Disposition  Final diagnoses:   Acute cough     Time reflects when diagnosis was documented in both MDM as applicable and the Disposition within this note       Time User Action Codes Description Comment    11/8/2023  2:39 PM Sally Del Rosario Add [R05.1] Acute cough           ED Disposition       ED Disposition   Discharge    Condition   Stable    Date/Time   Wed Nov 8, 2023 [address] discharge to home/self care. Follow-up Information       Follow up With Specialties Details Why Contact Info    Olga Bowen    [address] [place] [address] [address] [address] [phone]              Discharge Medication List as of 11/8/2023  2:48 PM        START taking these medications    Details   acetaminophen (TYLENOL) 500 mg tablet Take 1 tablet (500 mg total) by mouth every 6 (six) hours as needed for mild pain or fever, Starting Wed 11/8/2023, Normal      dextromethorphan-guaifenesin (MUCINEX DM)  MG per 12 hr tablet Take 1 tablet by mouth every 12 (twelve) hours, Starting Wed 11/8/2023, Normal           CONTINUE these medications which have NOT CHANGED    Details   buPROPion (WELLBUTRIN SR) 150 mg 12 hr tablet TAKE 1 TABLET BY MOUTH TWICE A DAY, Starting Mon 11/6/2023, Normal      Insulin Pen Needle 32G X 4 MM MISC Use daily, Starting Wed 7/19/2023, Normal      liraglutide (SAXENDA) injection Inject subcutaneously WEEK 1 use 0.6mg day,  WEEK 2 use 1.2mg day, WEEK 3 use 1.8mg day, WEEK 4 use 2.4mg day, WEEK 5 use 3mg day, Normal      naltrexone (REVIA) 50 mg tablet Take 1/2 tablet for 2 weeks then take 1 tablet a day, Normal      omeprazole (PriLOSEC) 40 MG capsule Take 40 mg by mouth every morning, Starting Fri 5/5/2023, Historical Med      ondansetron (ZOFRAN) 4 mg tablet Take 1 tablet (4 mg total) by mouth every 6 (six) hours, Starting Tue 7/25/2023, Normal             No discharge procedures on file.     PDMP Review         Value Time User    PDMP Reviewed  Yes 10/12/2023  1:54 PM Norma Moses PA-C            ED Provider  Electronically Signed by             William Godfrey PA-C  11/08/23 2041

## 2023-11-13 ENCOUNTER — TELEPHONE (OUTPATIENT)
Dept: BARIATRICS | Facility: CLINIC | Age: 23
End: 2023-11-13

## 2023-11-14 ENCOUNTER — TELEPHONE (OUTPATIENT)
Dept: OBGYN CLINIC | Facility: CLINIC | Age: 23
End: 2023-11-14

## 2023-11-18 ENCOUNTER — TELEPHONE (OUTPATIENT)
Dept: OTHER | Facility: OTHER | Age: 23
End: 2023-11-18

## 2023-11-18 NOTE — TELEPHONE ENCOUNTER
Pt stated, "I missed my appointment on 11/13/23 at 11:00 am. I would like the office to call me to reschedule." Please call Pt when office reopens.

## 2023-11-22 ENCOUNTER — OFFICE VISIT (OUTPATIENT)
Dept: BARIATRICS | Facility: CLINIC | Age: 23
End: 2023-11-22
Payer: COMMERCIAL

## 2023-11-22 VITALS
WEIGHT: 214.2 LBS | SYSTOLIC BLOOD PRESSURE: 98 MMHG | DIASTOLIC BLOOD PRESSURE: 60 MMHG | RESPIRATION RATE: 16 BRPM | HEIGHT: 66 IN | BODY MASS INDEX: 34.42 KG/M2 | HEART RATE: 81 BPM

## 2023-11-22 DIAGNOSIS — E66.9 OBESITY, CLASS II, BMI 35-39.9: ICD-10-CM

## 2023-11-22 DIAGNOSIS — A08.4 VIRAL GASTROENTERITIS: ICD-10-CM

## 2023-11-22 DIAGNOSIS — E66.09 CLASS 1 OBESITY DUE TO EXCESS CALORIES WITHOUT SERIOUS COMORBIDITY WITH BODY MASS INDEX (BMI) OF 34.0 TO 34.9 IN ADULT: Primary | ICD-10-CM

## 2023-11-22 DIAGNOSIS — K21.9 GERD (GASTROESOPHAGEAL REFLUX DISEASE): ICD-10-CM

## 2023-11-22 PROCEDURE — 99214 OFFICE O/P EST MOD 30 MIN: CPT | Performed by: PHYSICIAN ASSISTANT

## 2023-11-22 RX ORDER — ONDANSETRON 4 MG/1
4 TABLET, FILM COATED ORAL EVERY 8 HOURS PRN
Qty: 20 TABLET | Refills: 0 | Status: SHIPPED | OUTPATIENT
Start: 2023-11-22

## 2023-11-22 NOTE — ASSESSMENT & PLAN NOTE
-Patient is pursuing Conservative Program  -Initial weight loss goal of 5-10% weight loss for improved health  -Screening labs reviewed and up to date     Initial:228.4  Current:214.2  Change:-14.2  Goal:130lb    Currently on saxenda. She did have difficulty finding it but is now back on 0.6mg.  6.2% weight loss. Discussed staying on lower dose until tolerating better. To call if having increased side effects      Goals:  -discussed trying to get protein with meals/snacks  -to try to increase fluids intake  -discussed trying to stay on a similar schedule and to distribute calories more evenly throughout the day  -No sugary beverages-recommend decreasing alcohol intake. At least 64oz of water daily.   -recommend getting a step tracker and making a daily goal

## 2023-11-22 NOTE — PROGRESS NOTES
Assessment/Plan:    Class 1 obesity due to excess calories without serious comorbidity with body mass index (BMI) of 34.0 to 34.9 in adult  -Patient is pursuing Conservative Program  -Initial weight loss goal of 5-10% weight loss for improved health  -Screening labs reviewed and up to date     Initial:228.4  Current:214.2  Change:-14.2  Goal:130lb    Currently on saxenda. She did have difficulty finding it but is now back on 0.6mg.  6.2% weight loss. Discussed staying on lower dose until tolerating better. To call if having increased side effects      Goals:  -discussed trying to get protein with meals/snacks  -to try to increase fluids intake  -discussed trying to stay on a similar schedule and to distribute calories more evenly throughout the day  -No sugary beverages-recommend decreasing alcohol intake. At least 64oz of water daily. -recommend getting a step tracker and making a daily goal    GERD (gastroesophageal reflux disease)  Referred to GI . Symptoms present before saxenda and has not got worse with it. No longer taking omeprazole but does not feel it was helping        Follow up in approximately  4 months  with Non-Surgical Physician/Advanced Practitioner. Diagnoses and all orders for this visit:    Class 1 obesity due to excess calories without serious comorbidity with body mass index (BMI) of 34.0 to 34.9 in adult    GERD (gastroesophageal reflux disease)  -     Ambulatory referral to Gastroenterology; Future    Viral gastroenteritis  -     ondansetron (ZOFRAN) 4 mg tablet; Take 1 tablet (4 mg total) by mouth every 8 (eight) hours as needed for nausea or vomiting    Obesity, Class II, BMI 35-39.9  -     liraglutide (SAXENDA) injection;  Inject subcutaneously WEEK 1 use 0.6mg day,  WEEK 2 use 1.2mg day, WEEK 3 use 1.8mg day, WEEK 4 use 2.4mg day, WEEK 5 use 3mg day          Subjective:   Chief Complaint   Patient presents with    Follow-up     MWM- 4mth f/u, Waist 47.5in        Patient ID: Kenzie Kuo Chioma Babcock  is a 26 y.o. female with excess weight/obesity here to pursue weight managment. Patient is pursuing Conservative Program.     HPI  She just restarted saxenda 0.6mg. She initially went on 1.2mg dose. She does get nausea, diarrhea and "sulfur burps". She does have reflux but it is not changed. She was on omeprazole and it didn't help. She has this for a longtime. This was prior to the medication. She was interested in seeing GI   Wt Readings from Last 10 Encounters:   11/22/23 97.2 kg (214 lb 3.2 oz)   11/08/23 99.4 kg (219 lb 2.2 oz)   07/25/23 101 kg (223 lb 1.7 oz)   07/07/23 104 kg (228 lb 6.4 oz)   05/25/23 106 kg (233 lb 0.4 oz)   05/16/23 104 kg (230 lb 3.2 oz)   05/08/23 104 kg (229 lb)   05/04/23 105 kg (232 lb 6.4 oz)   05/02/23 104 kg (229 lb 8 oz)   04/25/23 106 kg (233 lb 12.8 oz)        Hydration:trying to drink more fluids  Exercise:nothign formal     Colonoscopy-Completed    Diet Recall (depends on work shift what she is eating)  L or D; usually food at work    The following portions of the patient's history were reviewed and updated as appropriate: She  has a past medical history of Asthma, Chlamydia (2022), Depression, and GERD (gastroesophageal reflux disease). She   Patient Active Problem List    Diagnosis Date Noted    GERD (gastroesophageal reflux disease) 11/22/2023    Class 1 obesity due to excess calories without serious comorbidity with body mass index (BMI) of 34.0 to 34.9 in adult 08/17/2013     She  has a past surgical history that includes No past surgeries. Her family history includes Cancer in her father; Hodgkin's lymphoma in her father; Thyroid disease in her mother. She  reports that she has never smoked. She has never used smokeless tobacco. She reports current alcohol use. She reports current drug use. Frequency: 7.00 times per week. Drug: Marijuana.   Current Outpatient Medications   Medication Sig Dispense Refill    Insulin Pen Needle 32G X 4 MM MISC Use daily 100 each 0    liraglutide (SAXENDA) injection Inject subcutaneously WEEK 1 use 0.6mg day,  WEEK 2 use 1.2mg day, WEEK 3 use 1.8mg day, WEEK 4 use 2.4mg day, WEEK 5 use 3mg day 15 mL 1    ondansetron (ZOFRAN) 4 mg tablet Take 1 tablet (4 mg total) by mouth every 8 (eight) hours as needed for nausea or vomiting 20 tablet 0    acetaminophen (TYLENOL) 500 mg tablet Take 1 tablet (500 mg total) by mouth every 6 (six) hours as needed for mild pain or fever 30 tablet 0    omeprazole (PriLOSEC) 40 MG capsule Take 40 mg by mouth every morning (Patient not taking: Reported on 11/22/2023)       No current facility-administered medications for this visit. Current Outpatient Medications on File Prior to Visit   Medication Sig    Insulin Pen Needle 32G X 4 MM MISC Use daily    [DISCONTINUED] liraglutide (SAXENDA) injection Inject subcutaneously WEEK 1 use 0.6mg day,  WEEK 2 use 1.2mg day, WEEK 3 use 1.8mg day, WEEK 4 use 2.4mg day, WEEK 5 use 3mg day    acetaminophen (TYLENOL) 500 mg tablet Take 1 tablet (500 mg total) by mouth every 6 (six) hours as needed for mild pain or fever    omeprazole (PriLOSEC) 40 MG capsule Take 40 mg by mouth every morning (Patient not taking: Reported on 11/22/2023)    [DISCONTINUED] buPROPion (WELLBUTRIN SR) 150 mg 12 hr tablet TAKE 1 TABLET BY MOUTH TWICE A DAY (Patient not taking: Reported on 11/22/2023)    [DISCONTINUED] dextromethorphan-guaifenesin (MUCINEX DM)  MG per 12 hr tablet Take 1 tablet by mouth every 12 (twelve) hours    [DISCONTINUED] naltrexone (REVIA) 50 mg tablet Take 1/2 tablet for 2 weeks then take 1 tablet a day (Patient not taking: Reported on 11/22/2023)    [DISCONTINUED] ondansetron (ZOFRAN) 4 mg tablet Take 1 tablet (4 mg total) by mouth every 6 (six) hours (Patient not taking: Reported on 11/22/2023)     No current facility-administered medications on file prior to visit. She has No Known Allergies. .    Review of Systems   Constitutional:  Negative for fever. Respiratory:  Negative for shortness of breath. Cardiovascular:  Negative for chest pain and palpitations. Gastrointestinal:  Positive for diarrhea and nausea. Negative for abdominal pain and constipation. Intermittent reflux   Genitourinary:  Negative for difficulty urinating. Musculoskeletal:  Negative for arthralgias and back pain. Skin:  Negative for rash. Neurological:  Negative for headaches. Psychiatric/Behavioral:  Negative for dysphoric mood. The patient is not nervous/anxious. Objective:    BP 98/60 (BP Location: Left arm, Patient Position: Sitting)   Pulse 81   Resp 16   Ht 5' 6" (1.676 m)   Wt 97.2 kg (214 lb 3.2 oz)   LMP 11/05/2023 (Exact Date)   BMI 34.57 kg/m²      Physical Exam  Vitals and nursing note reviewed. Constitutional:       General: She is not in acute distress. Appearance: She is well-developed. She is obese. HENT:      Head: Normocephalic and atraumatic. Eyes:      Conjunctiva/sclera: Conjunctivae normal.   Neck:      Thyroid: No thyromegaly. Pulmonary:      Effort: Pulmonary effort is normal. No respiratory distress. Skin:     Findings: No rash (visible). Neurological:      Mental Status: She is alert and oriented to person, place, and time.    Psychiatric:         Mood and Affect: Mood normal.         Behavior: Behavior normal.

## 2023-11-22 NOTE — ASSESSMENT & PLAN NOTE
Referred to GI . Symptoms present before saxenda and has not got worse with it.  No longer taking omeprazole but does not feel it was helping

## 2023-12-05 ENCOUNTER — TELEPHONE (OUTPATIENT)
Dept: BARIATRICS | Facility: CLINIC | Age: 23
End: 2023-12-05

## 2023-12-05 DIAGNOSIS — E66.9 OBESITY, CLASS II, BMI 35-39.9: ICD-10-CM

## 2023-12-06 ENCOUNTER — TELEPHONE (OUTPATIENT)
Dept: BARIATRICS | Facility: CLINIC | Age: 23
End: 2023-12-06

## 2023-12-06 NOTE — TELEPHONE ENCOUNTER
Pharmacy and patient was informed regarding the approval for saxenda from 12/6/2023-12/6/2024. She was also informed of the 4-5% wt loss requirements for renewal purposes.

## 2023-12-16 ENCOUNTER — ANNUAL EXAM (OUTPATIENT)
Dept: OBGYN CLINIC | Facility: CLINIC | Age: 23
End: 2023-12-16

## 2023-12-16 VITALS
DIASTOLIC BLOOD PRESSURE: 53 MMHG | HEART RATE: 69 BPM | WEIGHT: 216 LBS | SYSTOLIC BLOOD PRESSURE: 106 MMHG | BODY MASS INDEX: 34.86 KG/M2

## 2023-12-16 DIAGNOSIS — Z11.3 SCREEN FOR STD (SEXUALLY TRANSMITTED DISEASE): ICD-10-CM

## 2023-12-16 DIAGNOSIS — Z12.4 SCREENING FOR CERVICAL CANCER: ICD-10-CM

## 2023-12-16 DIAGNOSIS — N92.6 ABNORMAL MENSES: ICD-10-CM

## 2023-12-16 DIAGNOSIS — Z01.419 ENCOUNTER FOR GYNECOLOGICAL EXAMINATION WITHOUT ABNORMAL FINDING: Primary | ICD-10-CM

## 2023-12-16 DIAGNOSIS — Z12.39 ENCOUNTER FOR BREAST CANCER SCREENING USING NON-MAMMOGRAM MODALITY: ICD-10-CM

## 2023-12-16 DIAGNOSIS — Z31.9 PATIENT DESIRES PREGNANCY: ICD-10-CM

## 2023-12-16 PROCEDURE — S0612 ANNUAL GYNECOLOGICAL EXAMINA: HCPCS | Performed by: NURSE PRACTITIONER

## 2023-12-16 PROCEDURE — 87591 N.GONORRHOEAE DNA AMP PROB: CPT | Performed by: NURSE PRACTITIONER

## 2023-12-16 PROCEDURE — 87491 CHLMYD TRACH DNA AMP PROBE: CPT | Performed by: NURSE PRACTITIONER

## 2023-12-16 PROCEDURE — G0145 SCR C/V CYTO,THINLAYER,RESCR: HCPCS | Performed by: NURSE PRACTITIONER

## 2023-12-16 RX ORDER — VITAMIN A ACETATE, .BETA.-CAROTENE, ASCORBIC ACID, CHOLECALCIFEROL, .ALPHA.-TOCOPHEROL ACETATE, DL-, THIAMINE MONONITRATE, RIBOFLAVIN, NIACINAMIDE, PYRIDOXINE HYDROCHLORIDE, FOLIC ACID, CYANOCOBALAMIN, CALCIUM CARBONATE, FERROUS FUMARATE, ZINC OXIDE, CUPRIC OXIDE 9.9; 120; 920; 200; 400; 2; 12; 27; 1; 20; 10; 3; 1.84; 3080; 25 MG/1; MG/1; [IU]/1; MG/1; [IU]/1; MG/1; UG/1; MG/1; MG/1; MG/1; MG/1; MG/1; MG/1; [IU]/1; MG/1
1 TABLET, FILM COATED ORAL DAILY
Qty: 90 TABLET | Refills: 4 | Status: SHIPPED | OUTPATIENT
Start: 2023-12-16

## 2023-12-16 NOTE — PATIENT INSTRUCTIONS
Thank you for your confidence in our team.   We appreciate you and welcome your feedback. If you receive a survey from us, please take a few moments to let us know how we are doing. Sincerely,  KRISTAL Metz       OBESITY     Obesity is defined as a body mass index (BMI) which is greater than 30. Your Body mass index is 34.86 kg/m². .    The risks of obesity include  many health problems, such as injuries or physical disability. You may need tests to check for the following:  Diabetes     High blood pressure or high cholesterol     Heart disease     Gallbladder or liver disease     Cancer of the colon, breast, prostate, liver, or kidney     Sleep apnea     Arthritis or gout    Seek care immediately if:   You have a severe headache, confusion, or difficulty speaking. You have weakness on one side of your body. You have chest pain, sweating, or shortness of breath. Contact your healthcare provider if:   You have symptoms of gallbladder or liver disease, such as pain in your upper abdomen. You have knee or hip pain and discomfort while walking. You have symptoms of diabetes, such as intense hunger and thirst, and frequent urination. You have symptoms of sleep apnea, such as snoring or daytime sleepiness. You have questions or concerns about your condition or care. Treatment for obesity  focuses on helping you lose weight to improve your health. Even a small decrease in BMI can reduce the risk for many health problems. Your healthcare provider will help you set a weight-loss goal.  Lifestyle changes  are the first step in treating obesity. These include making healthy food choices and getting regular physical activity. Your healthcare provider may suggest a weight-loss program that involves coaching, education, and therapy. Medicine  may help you lose weight when it is used with a healthy diet and physical activity.      Surgery  can help you lose weight if you are very obese and have other health problems. There are several types of weight-loss surgery. Ask your healthcare provider for more information. Be successful losing weight:   Set small, realistic goals. An example of a small goal is to walk for 20 minutes 5 days a week. Anther goal is to lose 5% of your body weight. Tell friends, family members, and coworkers about your goals  and ask for their support. Ask a friend to lose weight with you, or join a weight-loss support group. Identify foods or triggers that may cause you to overeat , and find ways to avoid them. Remove tempting high-calorie foods from your home and workplace. Place a bowl of fresh fruit on your kitchen counter. If stress causes you to eat, then find other ways to cope with stress. Keep a diary to track what you eat and drink. Also write down how many minutes of physical activity you do each day. Weigh yourself once a week and record it in your diary. Eating changes: You will need to eat 500 to 1,000 fewer calories each day than you currently eat to lose 1 to 2 pounds a week. The following changes will help you cut calories:  Eat smaller portions. Use small plates, no larger than 9 inches in diameter. Fill your plate half full of fruits and vegetables. Measure your food using measuring cups until you know what a serving size looks like. Eat 3 meals and 1 or 2 snacks each day. Plan your meals in advance. [place] and eat at home most of the time. Eat slowly. Eat fruits and vegetables at every meal.  They are low in calories and high in fiber, which makes you feel full. Do not add butter, margarine, or cream sauce to vegetables. Use herbs to season steamed vegetables. Eat less fat and fewer fried foods. Eat more baked or grilled chicken and fish. These protein sources are lower in calories and fat than red meat. Limit fast food. Dress your salads with olive oil and vinegar instead of bottled dressing.      Limit the amount of sugar you eat. Do not drink sugary beverages. Limit alcohol. Activity changes:  Physical activity is good for your body in many ways. It helps you burn calories and build strong muscles. It decreases stress and depression, and improves your mood. It can also help you sleep better. Talk to your healthcare provider before you begin an exercise program.  Exercise for at least 30 minutes 5 days a week. Start slowly. Set aside time each day for physical activity that you enjoy and that is convenient for you. It is best to do both weight training and an activity that increases your heart rate, such as walking, bicycling, or swimming. Find ways to be more active. Do yard work and housecleaning. Walk up the stairs instead of using elevators. Spend your leisure time going to events that require walking, such as outdoor festivals or fairs. This extra physical activity can help you lose weight and keep it off. Follow up with your primary healthcare provider as directed. You may need to meet with a dietitian. Write down your questions so you remember to ask them during your visits.

## 2023-12-16 NOTE — LETTER
2023    To Shelli Casanova  : 2000      This letter is to advise you that your recent BLOODWORK for Sexually-Transmitted Diseases (HIV, hepatitis B, hepatitis C, and syphilis) results were reviewed by me and are NORMAL.  Please contact our office for an appointment if you have any additional concerns.    KRISTAL Franklin

## 2023-12-16 NOTE — PROGRESS NOTES
Alexei Dhaliwal is a 26 y.o. female who presents today for annual GYN exam.  She has never had a pap smear performed previously. She reports menses as regular although she states that past 2 months have been slightly unusual as October's menses was 9 days late and then November's menses was a bit lighter than typical.  Patient's last menstrual period was 2023 (exact date). Her general medical history has been reviewed and she reports it as follows:    Past Medical History:   Diagnosis Date    Asthma     Chlamydia     Depression     GERD (gastroesophageal reflux disease)      Past Surgical History:   Procedure Laterality Date    NO PAST SURGERIES       OB History          1    Para   0    Term   0       0    AB   1    Living   0         SAB   1    IAB   0    Ectopic   0    Multiple   0    Live Births   0               Social History     Tobacco Use    Smoking status: Never    Smokeless tobacco: Never   Vaping Use    Vaping status: Some Days    Substances: Nicotine, Flavoring   Substance Use Topics    Alcohol use: Yes     Comment: couple times/month    Drug use: Yes     Frequency: 7.0 times per week     Types: Marijuana     Comment: daily marijuana use     Social History     Substance and Sexual Activity   Sexual Activity Yes    Partners: Male    Birth control/protection: None     Cancer-related family history includes Cancer in her father. There is no history of Breast cancer, Colon cancer, or Ovarian cancer. Current Medications:  none    Review of Systems:  Review of Systems   Constitutional: Negative. Gastrointestinal: Negative. Genitourinary:  Negative for difficulty urinating, menstrual problem, pelvic pain and vaginal discharge. Skin: Negative. Physical Exam:  /53   Pulse 69   Wt 98 kg (216 lb)   LMP 2023 (Exact Date)   BMI 34.86 kg/m²   Physical Exam  Constitutional:       General: She is not in acute distress. Appearance: She is well-developed. Genitourinary:      Vulva normal.      No lesions in the vagina. Right Adnexa: not tender and no mass present. Left Adnexa: not tender and no mass present. No cervical motion tenderness or lesion. Uterus is not tender. Breasts:     Right: No mass, nipple discharge, skin change or tenderness. Left: No mass, nipple discharge, skin change or tenderness. Neck:      Thyroid: No thyromegaly. Cardiovascular:      Rate and Rhythm: Normal rate and regular rhythm. Pulmonary:      Effort: Pulmonary effort is normal.   Abdominal:      Palpations: Abdomen is soft. Tenderness: There is no abdominal tenderness. Musculoskeletal:      Cervical back: Neck supple. Neurological:      Mental Status: She is alert and oriented to person, place, and time. Skin:     General: Skin is warm and dry. Vitals reviewed. Assessment/Plan:   1. Normal well-woman GYN exam.  2. Cervical cancer screening:  Normal cervical exam.  Pap smear done with HPV reflex. Has received HPV vaccine in the past.   3. STD screening:  Orders placed for vaginal GC/CT cultures. Orders placed for serum anti-HIV, anti-HCV, HbsAg, syphilis panel. 4. Breast cancer screening:  Normal breast exam.  Reviewed breast self-awareness. 5. Depression Screening: Patient's depression screening was assessed with a PHQ-2 score of 2. Their PHQ-9 score was 7. Clinically patient does not have depression. No treatment is required. 6. BMI Counseling: Body mass index is 34.86 kg/m². Discussed the patient's BMI with her. The BMI is above normal. Nutrition recommendations include reducing portion sizes and decreasing overall calorie intake. 7. Contraception:  Declines. Desires pregnancy. Rx prenatal vitamins sent to pharmacy. 8. Abnormal Menses:  Will check TSH and Bhcg.    9. Return to office in 1 year for annual GYN exam.    Reviewed with patient that test results are available in [address] [place] [address] immediately, but that they will not necessarily be reviewed by me immediately. Explained that I will review results at my earliest opportunity and contact patient appropriately.

## 2023-12-16 NOTE — LETTER
2023    To Shelli Casanova  : 2000      This letter is to advise you that your recent CULTURES for gonorrhea and chlamydia were reviewed by me and are NORMAL.  Please contact the office for an appointment if you have any additional concerns.    KRISTAL Franklin

## 2023-12-16 NOTE — LETTER
2023    To Shelli Casanova  : 2000      This letter is to advise you that your recent PAP SMEAR results were reviewed by me and are NORMAL.  We will see you in 1 year for your annual exam.    KRISTAL Franklin

## 2023-12-18 ENCOUNTER — APPOINTMENT (OUTPATIENT)
Dept: LAB | Facility: HOSPITAL | Age: 23
End: 2023-12-18
Payer: COMMERCIAL

## 2023-12-18 DIAGNOSIS — Z11.3 SCREEN FOR STD (SEXUALLY TRANSMITTED DISEASE): ICD-10-CM

## 2023-12-18 DIAGNOSIS — N92.6 ABNORMAL MENSES: ICD-10-CM

## 2023-12-18 LAB
B-HCG SERPL-ACNC: <1 MIU/ML (ref 0–5)
TSH SERPL DL<=0.05 MIU/L-ACNC: 0.32 UIU/ML (ref 0.45–4.5)

## 2023-12-18 PROCEDURE — 84443 ASSAY THYROID STIM HORMONE: CPT

## 2023-12-18 PROCEDURE — 86803 HEPATITIS C AB TEST: CPT

## 2023-12-18 PROCEDURE — 36415 COLL VENOUS BLD VENIPUNCTURE: CPT

## 2023-12-18 PROCEDURE — 87389 HIV-1 AG W/HIV-1&-2 AB AG IA: CPT

## 2023-12-18 PROCEDURE — 86780 TREPONEMA PALLIDUM: CPT

## 2023-12-18 PROCEDURE — 87340 HEPATITIS B SURFACE AG IA: CPT

## 2023-12-18 PROCEDURE — 84702 CHORIONIC GONADOTROPIN TEST: CPT

## 2023-12-18 PROCEDURE — 84439 ASSAY OF FREE THYROXINE: CPT

## 2023-12-19 LAB
C TRACH DNA SPEC QL NAA+PROBE: NEGATIVE
HBV SURFACE AG SER QL: NORMAL
HCV AB SER QL: NORMAL
HIV 1+2 AB+HIV1 P24 AG SERPL QL IA: NORMAL
HIV 2 AB SERPL QL IA: NORMAL
HIV1 AB SERPL QL IA: NORMAL
HIV1 P24 AG SERPL QL IA: NORMAL
N GONORRHOEA DNA SPEC QL NAA+PROBE: NEGATIVE
T4 FREE SERPL-MCNC: 0.95 NG/DL (ref 0.61–1.12)
TREPONEMA PALLIDUM IGG+IGM AB [PRESENCE] IN SERUM OR PLASMA BY IMMUNOASSAY: NORMAL

## 2023-12-22 LAB
LAB AP GYN PRIMARY INTERPRETATION: NORMAL
Lab: NORMAL

## 2024-04-01 ENCOUNTER — TELEPHONE (OUTPATIENT)
Dept: BARIATRICS | Facility: CLINIC | Age: 24
End: 2024-04-01

## 2024-05-28 ENCOUNTER — OFFICE VISIT (OUTPATIENT)
Dept: OBGYN CLINIC | Facility: CLINIC | Age: 24
End: 2024-05-28
Payer: MEDICARE

## 2024-05-28 VITALS
DIASTOLIC BLOOD PRESSURE: 60 MMHG | BODY MASS INDEX: 33.67 KG/M2 | OXYGEN SATURATION: 100 % | SYSTOLIC BLOOD PRESSURE: 108 MMHG | HEART RATE: 85 BPM | HEIGHT: 66 IN | WEIGHT: 209.5 LBS

## 2024-05-28 DIAGNOSIS — Z86.19 HISTORY OF CHLAMYDIA: ICD-10-CM

## 2024-05-28 DIAGNOSIS — N92.6 IRREGULAR MENSES: Primary | ICD-10-CM

## 2024-05-28 DIAGNOSIS — Z31.41 FERTILITY TESTING: ICD-10-CM

## 2024-05-28 PROBLEM — F41.9 ANXIETY: Status: ACTIVE | Noted: 2024-03-08

## 2024-05-28 PROCEDURE — 87491 CHLMYD TRACH DNA AMP PROBE: CPT | Performed by: OBSTETRICS & GYNECOLOGY

## 2024-05-28 PROCEDURE — 87591 N.GONORRHOEAE DNA AMP PROB: CPT | Performed by: OBSTETRICS & GYNECOLOGY

## 2024-05-28 PROCEDURE — 99214 OFFICE O/P EST MOD 30 MIN: CPT | Performed by: OBSTETRICS & GYNECOLOGY

## 2024-05-28 NOTE — PROGRESS NOTES
"New patient, previously seen at [facility]    Patient is a 23 y.o.  with No LMP recorded. who presents requesting \"to check my fertility and make sure everything is all right done there\".    Pt reports that she achieved pregnancy in 2022 and then had a miscarriage in 2023. She reports that she has not achieved pregnancy since. She reports she has been attempting pregnancy since that time and is now concerned.     She also reports her menses have changed since last year. She began to log them in 2023. She was previously a 30 day cycle. Now she ranges from 27-33 days, with most cycles ranging 32-33 days.     She reports sexual activity every other day, but the last two months it was only twice monthly    Past Medical History:   Diagnosis Date    Asthma     Chlamydia     treated    Depression     GERD (gastroesophageal reflux disease)        Past Surgical History:   Procedure Laterality Date    NO PAST SURGERIES         OB History    Para Term  AB Living   1 0 0 0 1 0   SAB IAB Ectopic Multiple Live Births   1 0 0 0 0      # Outcome Date GA Lbr Sundeep/2nd Weight Sex Type Anes PTL Lv   1 SAB 2023     SAB              Current Outpatient Medications:     Prenatal Vit-Fe Fumarate-FA (Prenatal Plus Vitamin/Mineral) 27-1 MG TABS, Take 1 tablet by mouth in the morning, Disp: 90 tablet, Rfl: 4    No Known Allergies    Social History     Socioeconomic History    Marital status: Single     Spouse name: None    Number of children: None    Years of education: None    Highest education level: None   Occupational History    None   Tobacco Use    Smoking status: Never    Smokeless tobacco: Never   Vaping Use    Vaping status: Some Days    Substances: Nicotine, Flavoring   Substance and Sexual Activity    Alcohol use: Yes     Comment: couple times/month    Drug use: Yes     Frequency: 7.0 times per week     Types: Marijuana     Comment: daily marijuana use    Sexual activity: Yes     " Partners: Male     Birth control/protection: None   Other Topics Concern    None   Social History Narrative    None     Social Determinants of Health     Financial Resource Strain: Medium Risk (12/16/2023)    Overall Financial Resource Strain (CARDIA)     Difficulty of Paying Living Expenses: Somewhat hard   Food Insecurity: No Food Insecurity (12/16/2023)    Hunger Vital Sign     Worried About Running Out of Food in the Last Year: Never true     Ran Out of Food in the Last Year: Never true   Transportation Needs: No Transportation Needs (12/16/2023)    PRAPARE - Transportation     Lack of Transportation (Medical): No     Lack of Transportation (Non-Medical): No   Physical Activity: Sufficiently Active (9/1/2022)    Received from [facility]    Exercise Vital Sign     Days of Exercise per Week: 3 days     Minutes of Exercise per Session: 100 min   Stress: Stress Concern Present (9/1/2022)    [facility] from [facility], [facility]    [employer] [place] [employer] - Occupational Stress Questionnaire     Feeling of Stress : Very much   Social Connections: Moderately Isolated (9/1/2022)    Received from [facility], [facility]    Social Connection and Isolation Panel [NHANES]     Frequency of Communication with Friends and Family: Twice a week     Frequency of Social Gatherings with Friends and Family: Once a week     Attends Restoration Services: Never     Active Member of Clubs or Organizations: No     Attends Club or Organization Meetings: Never     Marital Status: Living with partner   Intimate Partner Violence: Not At Risk (9/1/2022)    [facility] from [facility], [facility]    Humiliation, Afraid, Rape, and Kick questionnaire     Fear of Current or Ex-Partner: No     Emotionally Abused: No     Physically Abused: No     Sexually Abused: No   Housing Stability: High Risk (9/1/2022)     "[facility], [facility]    Housing Stability Vital Sign     Unable to Pay for Housing in the Last Year: No     Number of Places Lived in the Last Year: 3     Unstable Housing in the Last Year: No       Family History   Problem Relation Age of Onset    Thyroid disease Mother     Cancer Father         Hodgkin's    Hodgkin's lymphoma Father     Breast cancer Neg Hx     Colon cancer Neg Hx     Ovarian cancer Neg Hx        Review of Systems   Constitutional:  Negative for chills, fatigue, fever and unexpected weight change.   HENT:  Negative for congestion, mouth sores and sore throat.    Respiratory:  Negative for cough, chest tightness, shortness of breath and wheezing.    Cardiovascular:  Negative for chest pain and palpitations.   Gastrointestinal:  Negative for abdominal distention, abdominal pain, constipation, diarrhea, nausea and vomiting.   Endocrine: Negative for cold intolerance and heat intolerance.   Genitourinary:  Positive for menstrual problem. Negative for dyspareunia, dysuria, genital sores, pelvic pain, vaginal bleeding, vaginal discharge and vaginal pain.   Musculoskeletal:  Negative for arthralgias.   Skin:  Negative for color change and rash.   Neurological:  Negative for dizziness, light-headedness and headaches.   Hematological:  Negative for adenopathy.       Blood pressure 108/60, pulse 85, height 5' 6\" (1.676 m), weight 95 kg (209 lb 8 oz), SpO2 100%, not currently breastfeeding. and Body mass index is 33.81 kg/m².    Physical Exam  Constitutional:       General: She is not in acute distress.     Appearance: Normal appearance. She is obese. She is not ill-appearing.   HENT:      Head: Normocephalic and atraumatic.   Eyes:      Extraocular Movements: Extraocular movements intact.      Conjunctiva/sclera: Conjunctivae normal.   Pulmonary:      Effort: Pulmonary effort is normal.   Abdominal:      General: There is no distension.      Palpations: " Abdomen is soft. There is no mass.      Tenderness: There is no abdominal tenderness. There is no guarding or rebound.      Hernia: No hernia is present.   Musculoskeletal:         General: Normal range of motion.      Cervical back: Normal range of motion.   Skin:     General: Skin is warm.      Findings: No erythema or rash.   Neurological:      Mental Status: She is alert and oriented to person, place, and time.   Psychiatric:         Mood and Affect: Mood normal.         Behavior: Behavior normal.         Thought Content: Thought content normal.         Judgment: Judgment normal.            vulva: normal external genitalia for age and no lesions, masses, epithelial changes, or exudate  vagina: color pink, rugae  well formed rugae, and discharge  yellow, mucoid, and odiferous  cervix: parous and no lesions   uterus: NSSC, AF, NT, mobile  adnexa: no masses or tenderness    A/P:  Pt is a 23 y.o.  with      Shelli was seen today for consult.    Diagnoses and all orders for this visit:    Irregular menses  -     Cancel: Chlamydia/GC amplified DNA by PCR  -     Estradiol; Future  -     Follicle stimulating hormone; Future  -     Luteinizing hormone; Future  -     Prolactin; Future  -     TSH, 3rd generation with Free T4 reflex; Future  -     Progesterone; Future    Fertility testing  -     Progesterone; Future  -     [place] hysterosalpingogram; Future    History of chlamydia  -     Chlamydia/GC amplified DNA by PCR      Will contact patient with results.

## 2024-05-28 NOTE — PATIENT INSTRUCTIONS
Use ovulation detection kits every month from day 12 to day 25 of your cycle until you see a positive ovulation and document when it turned positive  Have intercourse every other day during this time frame as well

## 2024-05-30 LAB
C TRACH DNA SPEC QL NAA+PROBE: NEGATIVE
N GONORRHOEA DNA SPEC QL NAA+PROBE: NEGATIVE

## 2024-06-06 ENCOUNTER — HOSPITAL ENCOUNTER (EMERGENCY)
Facility: HOSPITAL | Age: 24
Discharge: HOME | End: 2024-06-06
Attending: EMERGENCY MEDICINE | Admitting: EMERGENCY MEDICINE
Payer: COMMERCIAL

## 2024-06-06 VITALS
SYSTOLIC BLOOD PRESSURE: 106 MMHG | OXYGEN SATURATION: 98 % | RESPIRATION RATE: 18 BRPM | DIASTOLIC BLOOD PRESSURE: 73 MMHG | TEMPERATURE: 97.8 F | HEART RATE: 86 BPM

## 2024-06-06 DIAGNOSIS — T25.221A BURN OF SECOND DEGREE OF RIGHT FOOT, INITIAL ENCOUNTER: Primary | ICD-10-CM

## 2024-06-06 PROCEDURE — 99283 EMERGENCY DEPT VISIT LOW MDM: CPT | Performed by: EMERGENCY MEDICINE

## 2024-06-06 PROCEDURE — 99283 EMERGENCY DEPT VISIT LOW MDM: CPT

## 2024-06-06 NOTE — Clinical Note
Shelli Casanova was seen and treated in our emergency department on 6/6/2024.                Diagnosis: Burn of right foot    Shelli  .    She may return on this date:     Please excuse from work for 2 days for recovering and healing of injury     If you have any questions or concerns, please don't hesitate to call.      Helio Hernández MD    ______________________________           _______________          _______________  Hospital Representative                              Date                                Time

## 2024-06-06 NOTE — ED PROVIDER NOTES
History  Chief Complaint   Patient presents with    Foot Burn     Pt reports she dropped hot butter and sugar on top of right foot 3 days ago.      22 yo F presents for evaluation of burn she sustained on her right foot from hot melted butter mixed with sugar that dropped on her foot 3 days ago.  She developed a few blisters from the burn on the top of her foot.  She has been able to walk on it . The smaller ones ruptured themselves and are sloughed.  The main large blister is still closed.        History provided by:  Patient      Prior to Admission Medications   Prescriptions Last Dose Informant Patient Reported? Taking?   Prenatal Vit-Fe Fumarate-FA (Prenatal Plus Vitamin/Mineral) 27-1 MG TABS   No No   Sig: Take 1 tablet by mouth in the morning      Facility-Administered Medications: None       Past Medical History:   Diagnosis Date    Asthma     Chlamydia 2022    treated    Depression     GERD (gastroesophageal reflux disease)        Past Surgical History:   Procedure Laterality Date    NO PAST SURGERIES         Family History   Problem Relation Age of Onset    Thyroid disease Mother     Cancer Father         Hodgkin's    Hodgkin's lymphoma Father     Breast cancer Neg Hx     Colon cancer Neg Hx     Ovarian cancer Neg Hx      I have reviewed and agree with the history as documented.    E-Cigarette/Vaping    E-Cigarette Use Current Some Day User      E-Cigarette/Vaping Substances    Nicotine Yes     THC No     CBD No     Flavoring Yes      Social History     Tobacco Use    Smoking status: Never    Smokeless tobacco: Never   Vaping Use    Vaping status: Some Days    Substances: Nicotine, Flavoring   Substance Use Topics    Alcohol use: Yes     Comment: couple times/month    Drug use: Yes     Frequency: 7.0 times per week     Types: Marijuana     Comment: daily marijuana use       Review of Systems    Physical Exam  Physical Exam  Vitals and nursing note reviewed.   Musculoskeletal:        Feet:    Neurological:       Mental Status: She is alert.         Vital Signs  ED Triage Vitals [06/06/24 1348]   Temperature Pulse Respirations Blood Pressure SpO2   97.8 °F (36.6 °C) 86 18 106/73 98 %      Temp Source Heart Rate Source Patient Position - Orthostatic VS BP Location FiO2 (%)   Temporal Monitor -- Right arm --      Pain Score       --           Vitals:    06/06/24 1348   BP: 106/73   Pulse: 86         Visual Acuity      ED Medications  Medications - No data to display    Diagnostic Studies  Results Reviewed       None                   No orders to display              Procedures  Procedures         ED Course                               SBIRT 20yo+      Flowsheet Row Most Recent Value   Initial Alcohol Screen: US AUDIT-C     1. How often do you have a drink containing alcohol? 0 Filed at: 06/06/2024 1419   2. How many drinks containing alcohol do you have on a typical day you are drinking?  0 Filed at: 06/06/2024 1419   3a. Male UNDER 65: How often do you have five or more drinks on one occasion? 0 Filed at: 06/06/2024 1419   3b. FEMALE Any Age, or MALE 65+: How often do you have 4 or more drinks on one occassion? 0 Filed at: 06/06/2024 1419   Audit-C Score 0 Filed at: 06/06/2024 1419   CELESTINE: How many times in the past year have you...    Used an illegal drug or used a prescription medication for non-medical reasons? Never Filed at: 06/06/2024 1419                      Medical Decision Making  At this time, the overlying skin is intact, so nothing topical needs to be applied.  She is encouraged to avoid rupturing the blister for as long as possible to allow the skin to heal and prevent infection.  I explained they do not need to be unroofed, here or at home.  I explained how to keep clean and covered, if/when it does rupture spontaneously .              Disposition  Final diagnoses:   Burn of second degree of right foot, initial encounter     Time reflects when diagnosis was documented in both MDM as applicable and the  Disposition within this note       Time User Action Codes Description Comment    6/6/2024  2:27 PM Helio Hernández Add [T25.221A] Burn of second degree of right foot, initial encounter           ED Disposition       ED Disposition   Discharge    Condition   Good    Date/Time   Thu Jun 6, 2024 1427    Comment   Shelli Casanova discharge to home/self care.                   Follow-up Information       Follow up With Specialties Details Why Contact Info    Lisa Sotelo  Go to  As needed [address] [facility] [address]  [address]  [place] [id number] [phone]              Discharge Medication List as of 6/6/2024  2:28 PM        CONTINUE these medications which have NOT CHANGED    Details   Prenatal Vit-Fe Fumarate-FA (Prenatal Plus Vitamin/Mineral) 27-1 MG TABS Take 1 tablet by mouth in the morning, Starting Sat 12/16/2023, Normal             No discharge procedures on file.    PDMP Review         Value Time User    PDMP Reviewed  Yes 10/12/2023  1:54 PM Norma Moses PA-C            ED Provider  Electronically Signed by             Helio Hernández MD  06/06/24 1138

## 2024-06-06 NOTE — DISCHARGE INSTRUCTIONS
Second Degree Burn   WHAT YOU NEED TO KNOW:   A second degree burn is also called a partial thickness burn. Your skin contains 3 layers. A second degree burn occurs when the first layer and some of the second layer are burned. This type of burn usually heals within 2 to 3 weeks with some scarring.  Burn care:   Wash your hands with soap and water and remove old bandages. You may need to soak the bandage in water before you remove it so it will not stick to your wound.    Gently clean the burned area daily with mild soap and water, and pat dry. Look for any swelling or redness around the burn. Do not break closed blisters, because this increases the risk for infection.    Apply cream or ointment to the burn with a cotton swab. Place a nonstick bandage over your burn.     Wrap a layer of gauze around the bandage to hold it in place. The wrap should be snug but not tight. It is too tight if you feel tingling or lose feeling in that area.    Apply gentle pressure for a few minutes if bleeding occurs.    Elevate your burned arm or leg above the level of your heart as often as you can. This will help decrease swelling and pain. Prop your burned arm or leg on pillows or blankets to keep it elevated comfortably.  Follow up or return for increasing redness, pain, swelling, drainage.

## 2024-07-31 DIAGNOSIS — Z31.9 PATIENT DESIRES PREGNANCY: ICD-10-CM

## 2024-07-31 RX ORDER — VITAMIN A ACETATE, .BETA.-CAROTENE, ASCORBIC ACID, CHOLECALCIFEROL, .ALPHA.-TOCOPHEROL ACETATE, DL-, THIAMINE MONONITRATE, RIBOFLAVIN, NIACINAMIDE, PYRIDOXINE HYDROCHLORIDE, FOLIC ACID, CYANOCOBALAMIN, CALCIUM CARBONATE, FERROUS FUMARATE, ZINC OXIDE, CUPRIC OXIDE 9.9; 120; 920; 200; 400; 2; 12; 27; 1; 20; 10; 3; 1.84; 3080; 25 MG/1; MG/1; [IU]/1; MG/1; [IU]/1; MG/1; UG/1; MG/1; MG/1; MG/1; MG/1; MG/1; MG/1; [IU]/1; MG/1
1 TABLET, FILM COATED ORAL DAILY
Qty: 90 TABLET | Refills: 4 | Status: SHIPPED | OUTPATIENT
Start: 2024-07-31

## 2024-09-25 ENCOUNTER — TELEPHONE (OUTPATIENT)
Age: 24
End: 2024-09-25

## 2024-09-25 DIAGNOSIS — Z31.41 FERTILITY TESTING: Primary | ICD-10-CM

## 2024-09-25 NOTE — TELEPHONE ENCOUNTER
Pt called to schedule FL HYSTEROSALPINGOGRAM. Central scheduling informed pt this imaging is scheduled with the provider. Please advise

## 2024-09-25 NOTE — TELEPHONE ENCOUNTER
Patient requesting fertility labs to be reordered as some have been discontinued: TSH, Prolactin, LH. Patient was unable to get to lab to have them drawn before they .  Routing to provider for review.

## 2024-09-25 NOTE — TELEPHONE ENCOUNTER
Labs reordered. Please advise patient to have them done in a timely fashion as they were previously ordered in May  The best time is day 3 of her menses which is tomorrow

## 2024-09-30 ENCOUNTER — HOSPITAL ENCOUNTER (OUTPATIENT)
Dept: RADIOLOGY | Facility: HOSPITAL | Age: 24
Discharge: HOME | End: 2024-09-30
Attending: OBSTETRICS & GYNECOLOGY
Payer: COMMERCIAL

## 2024-09-30 DIAGNOSIS — Z31.41 FERTILITY TESTING: ICD-10-CM

## 2024-09-30 PROCEDURE — 58340 CATHETER FOR HYSTEROGRAPHY: CPT

## 2024-09-30 PROCEDURE — 74740 X-RAY FEMALE GENITAL TRACT: CPT

## 2024-09-30 RX ADMIN — IOHEXOL 5 ML: 240 INJECTION, SOLUTION INTRATHECAL; INTRAVASCULAR; INTRAVENOUS; ORAL at 13:17

## 2024-09-30 NOTE — PROCEDURES
Shelli Casanova, a  at Unknown with an CRISTY of Not found., was seen at [facility] FLUOROSCOPY for the following procedure(s):  ]    The patient presents for hysterosalpingogram for fertility testing    Verbal consent for the procedure was obtained. All questions were answered. A time out was performed verifying the patient's name and date of birth.    The patient was placed in dorsal lithotomy position. A sterile speculum was placed in the vagina. The cervix was cleansed with betadine x 3 swabs. The hysterosalpingogram catheter was placed through the cervical os and the balloon was dilated. The speculum was then removed.    Under direct fluoroscopy, 5 ml of Omnipaque 240 was used to visualized the uterus and fallopian tubes. The uterus was noted to be anteflexed, had a normal shape and contour. Bilateral fallopian tubes were noted to be patent.    The procedure was completed and all instruments were removed.    The patient tolerated the procedure well.    Impression: Normal hysterosalpingogram with bilateral fallopian tube spill.       Barbie Waters MD

## 2024-10-13 ENCOUNTER — APPOINTMENT (OUTPATIENT)
Dept: LAB | Facility: HOSPITAL | Age: 24
End: 2024-10-13
Payer: COMMERCIAL

## 2024-10-13 DIAGNOSIS — Z31.41 FERTILITY TESTING: ICD-10-CM

## 2024-10-13 DIAGNOSIS — N92.6 IRREGULAR MENSES: ICD-10-CM

## 2024-10-13 LAB
ESTRADIOL SERPL-MCNC: 153.3 PG/ML
LH SERPL-ACNC: 4.7 MIU/ML
PROLACTIN SERPL-MCNC: 34.44 NG/ML (ref 3.34–26.72)
T4 FREE SERPL-MCNC: 0.77 NG/DL (ref 0.61–1.12)
TSH SERPL DL<=0.05 MIU/L-ACNC: 0.17 UIU/ML (ref 0.45–4.5)

## 2024-10-13 PROCEDURE — 36415 COLL VENOUS BLD VENIPUNCTURE: CPT

## 2024-10-13 PROCEDURE — 84443 ASSAY THYROID STIM HORMONE: CPT

## 2024-10-13 PROCEDURE — 84439 ASSAY OF FREE THYROXINE: CPT

## 2024-10-13 PROCEDURE — 84146 ASSAY OF PROLACTIN: CPT

## 2024-10-13 PROCEDURE — 83002 ASSAY OF GONADOTROPIN (LH): CPT

## 2024-10-13 PROCEDURE — 82670 ASSAY OF TOTAL ESTRADIOL: CPT

## 2024-10-16 ENCOUNTER — APPOINTMENT (OUTPATIENT)
Dept: LAB | Facility: HOSPITAL | Age: 24
End: 2024-10-16
Payer: COMMERCIAL

## 2024-10-16 LAB — PROGEST SERPL-MCNC: 12.4 NG/ML

## 2024-10-16 PROCEDURE — 83001 ASSAY OF GONADOTROPIN (FSH): CPT

## 2024-10-16 PROCEDURE — 84144 ASSAY OF PROGESTERONE: CPT

## 2024-10-16 PROCEDURE — 36415 COLL VENOUS BLD VENIPUNCTURE: CPT

## 2024-10-18 ENCOUNTER — TELEPHONE (OUTPATIENT)
Dept: OBGYN CLINIC | Facility: CLINIC | Age: 24
End: 2024-10-18

## 2024-10-18 DIAGNOSIS — R79.89 ELEVATED PROLACTIN LEVEL: Primary | ICD-10-CM

## 2024-10-18 DIAGNOSIS — R79.89 LOW TSH LEVEL: ICD-10-CM

## 2024-10-18 LAB — FSH SERPL-ACNC: 2.1 MIU/ML

## 2024-10-18 NOTE — TELEPHONE ENCOUNTER
I called the patient to review her lab results  We reviewed that her TSH is low and her PRL is elevated  Her FSH was never drawn--will check to see if lab can add it to her labs    Progesterone is c/w ovulation this month    Referral to endocrine given    As pt did ovulate this month and is having unprotected intercourse, recommend UPT on 10/28 if no menses by then as she typically has menses Q 32-33 days.     All questions answered

## 2024-10-22 ENCOUNTER — TELEPHONE (OUTPATIENT)
Age: 24
End: 2024-10-22

## 2024-10-22 NOTE — TELEPHONE ENCOUNTER
----- Message from Barbie Waters MD sent at 10/21/2024  8:48 AM EDT -----  Normal FSH for luteal phase. Reassure patient

## 2024-10-22 NOTE — TELEPHONE ENCOUNTER
Patient returning phone call, pt was notified of Dr Cunningham recommendations, Pt verbalized understanding, no further questions at this time

## 2024-10-31 ENCOUNTER — TELEPHONE (OUTPATIENT)
Dept: SURGERY | Facility: CLINIC | Age: 24
End: 2024-10-31

## 2024-10-31 ENCOUNTER — NURSE TRIAGE (OUTPATIENT)
Dept: SURGERY | Facility: CLINIC | Age: 24
End: 2024-10-31

## 2024-10-31 DIAGNOSIS — L05.01 PILONIDAL ABSCESS OF NATAL CLEFT: Primary | ICD-10-CM

## 2024-10-31 RX ORDER — METRONIDAZOLE 500 MG/1
500 TABLET ORAL 3 TIMES DAILY
Qty: 30 TABLET | Refills: 0 | Status: SHIPPED | OUTPATIENT
Start: 2024-10-31 | End: 2024-11-10

## 2024-10-31 RX ORDER — CEPHALEXIN 500 MG/1
500 CAPSULE ORAL 4 TIMES DAILY
Qty: 40 CAPSULE | Refills: 0 | Status: SHIPPED | OUTPATIENT
Start: 2024-10-31 | End: 2024-11-10

## 2024-10-31 NOTE — TELEPHONE ENCOUNTER
Spoke to patient to see how she is feeling regarding a message that was sent Via [employer]. States she is having pain and discomfort the bump is getting bigger no drainage Dulce advises our patients to call in the first site of the cyst so antibiotics can be called in so the infection does not get worse.  We will see patient on Monday to evaluate the cyst.

## 2024-11-04 ENCOUNTER — OFFICE VISIT (OUTPATIENT)
Dept: SURGERY | Facility: CLINIC | Age: 24
End: 2024-11-04
Payer: COMMERCIAL

## 2024-11-04 ENCOUNTER — TELEPHONE (OUTPATIENT)
Dept: SURGERY | Facility: CLINIC | Age: 24
End: 2024-11-04

## 2024-11-04 VITALS
HEIGHT: 66 IN | RESPIRATION RATE: 16 BRPM | HEART RATE: 66 BPM | SYSTOLIC BLOOD PRESSURE: 124 MMHG | OXYGEN SATURATION: 99 % | DIASTOLIC BLOOD PRESSURE: 82 MMHG | WEIGHT: 202 LBS | BODY MASS INDEX: 32.47 KG/M2 | TEMPERATURE: 97.7 F

## 2024-11-04 DIAGNOSIS — L05.91 PILONIDAL CYST: Primary | ICD-10-CM

## 2024-11-04 PROCEDURE — 99213 OFFICE O/P EST LOW 20 MIN: CPT | Performed by: SURGERY

## 2024-11-04 NOTE — TELEPHONE ENCOUNTER
Message left for patient to return call to office to check in on how she is feeling since starting the antibiotics.     Also Provider got called in to  surgery this afternoon so appointment needs to be rescheduled from 2:00pm to another day or patient can come in earlier in the day.

## 2024-11-04 NOTE — PROGRESS NOTES
Assessment/Plan:   Shelli Casanova is a 24 y.o.female who is here for   Chief Complaint   Patient presents with    Pilonidal Cyst     Patient is here today regarding a Pilonidal Cyst that started to become inflamed and irritated on 10/30/24. Patient contacted the office and was placed on Keflex and Flagyl which has calmed the cyst down she is no longer in pain and is able to sit comfortably, No drainage no redness. Patient was seen 05/2023 for the first time for a I&D of a Pilonidal cyst.          She had a recent flare and was started on oral antibiotics and this resolved.  No skin breakage no drainage and not even a mass.  But it was starting to cause her pain and seem to be enlarging within the deep surface.  This went away quickly on Keflex.    Just discussed the pros and cons of surgery now versus continuing a watchful wait.  It looks so benign now that I encouraged her to wait but like any possible cyst, if it continues to bother her on a periodic basis and she considers elective excision of it while is quiescent.    Plan:    Pilonidal abscess.  Will eventually need definitive excision.         We had a long discussion regarding the long-term care management of an open wound.  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  She will need at least 2 weeks off and might need quite a long more amount of time depending on her wound care, level of infection and her pain tolerance.          Smoking cessation weight loss and any diabetes control would greatly improve her chances of reasonable wound healing.  She  does vape, and this is the equivalent of smoking for his of wound healing and health care.      Post Op Pain Management:   Motrin and Tylenol  Norco when in hospital     Pilonidal cyst in this patient population can be very challenging including the risk of open wound, prolonged wound healing, repeat or recurrent disease.  It is very likely she will have an open wound that will be left to heal with secondary  intention and/or with a wound VAC if it is amenable to a wound VAC.  These are complex wounds requiring additional management.  She understands      Preoperative Clearance: None          _______________________________________________________  CC:Pilonidal Cyst (Patient is here today regarding a Pilonidal Cyst that started to become inflamed and irritated on 10/30/24. Patient contacted the office and was placed on Keflex and Flagyl which has calmed the cyst down she is no longer in pain and is able to sit comfortably, No drainage no redness. Patient was seen 05/2023 for the first time for a I&D of a Pilonidal cyst. )  .    HPI:  Shelli Casanova is a 24 y.o.female who was referred for evaluation of Pilonidal Cyst (Patient is here today regarding a Pilonidal Cyst that started to become inflamed and irritated on 10/30/24. Patient contacted the office and was placed on Keflex and Flagyl which has calmed the cyst down she is no longer in pain and is able to sit comfortably, No drainage no redness. Patient was seen 05/2023 for the first time for a I&D of a Pilonidal cyst. )  .    Currently patient reports had I and D in ER on 4/21/23 then a skin check on 4/23/23 and packing was removed at that time. Today patient states it came back four days ago and now is a 10/10 with chills at home. She states she can not sit on the area. She has never had surgery on this area.       ROS:  General ROS: negative  negative for - chills, fatigue, fever or night sweats, weight loss  Respiratory ROS: no cough, shortness of breath, or wheezing  Cardiovascular ROS: no chest pain or dyspnea on exertion  Genito-Urinary ROS: no dysuria, trouble voiding, or hematuria  Musculoskeletal ROS: negative for - gait disturbance, joint pain or muscle pain  Neurological ROS: no TIA or stroke symptoms  Skin ROS: See HPI  GI ROS: see HPI  Skin ROS: no new rashes or lesions   Lymphatic ROS: no new adenopathy noted by pt.   Psy ROS: no new mental or  behavioral disturbances       Patient Active Problem List   Diagnosis    BMI 33.0-33.9,adult    GERD (gastroesophageal reflux disease)    Anxiety         Allergies:  Patient has no known allergies.      Current Outpatient Medications:     cephalexin (KEFLEX) 500 mg capsule, Take 1 capsule (500 mg total) by mouth 4 (four) times a day for 10 days, Disp: 40 capsule, Rfl: 0    metroNIDAZOLE (FLAGYL) 500 mg tablet, Take 1 tablet (500 mg total) by mouth 3 (three) times a day for 10 days, Disp: 30 tablet, Rfl: 0    Prenatal Vit-Fe Fumarate-FA (Prenatal Plus Vitamin/Mineral) 27-1 MG TABS, Take 1 tablet by mouth in the morning, Disp: 90 tablet, Rfl: 4    Past Medical History:   Diagnosis Date    Asthma     Chlamydia 2022    treated    Depression     GERD (gastroesophageal reflux disease)     Postoperative wound infection 4/28/2023    big abscess       Past Surgical History:   Procedure Laterality Date    NO PAST SURGERIES         Family History   Problem Relation Age of Onset    Thyroid disease Mother     Cancer Father         Hodgkin's    Hodgkin's lymphoma Father     Breast cancer Neg Hx     Colon cancer Neg Hx     Ovarian cancer Neg Hx         reports that she has never smoked. She has never been exposed to tobacco smoke. She has never used smokeless tobacco. She reports current alcohol use of about 2.0 standard drinks of alcohol per week. She reports current drug use. Frequency: 7.00 times per week. Drug: Marijuana.    Vitals:    11/04/24 1222   BP: 124/82   Pulse: 66   Resp: 16   Temp: 97.7 °F (36.5 °C)   SpO2: 99%        PHYSICAL EXAM  General Appearance:    Alert, cooperative, no distress   Head:    Normocephalic without obvious abnormality   Eyes:    PERRL, conjunctiva/corneas clear     Neck:   Supple, no adenopathy, no JVD   Back:      Lungs:      Heart:     Abdomen:         Extremities:   Extremities normal. No clubbing, cyanosis or edema   Psych:   Normal Affect, AOx3.    Neurologic:  Skin:   CNII-XII intact.  Strength symmetric, speech intact    Warm, dry, intact, no visible rashes or lesions except as f  No mass no lesion very soft with pinpoint stigmata of a pilonidal.  No skin changes not even a mass lesion today.           Alexa Coombs MD    Date: 11/4/2024 Time: 12:29 PM

## 2024-11-08 ENCOUNTER — NURSE TRIAGE (OUTPATIENT)
Age: 24
End: 2024-11-08

## 2024-11-08 DIAGNOSIS — N89.8 VAGINAL ITCHING: Primary | ICD-10-CM

## 2024-11-08 RX ORDER — FLUCONAZOLE 150 MG/1
150 TABLET ORAL DAILY
Qty: 1 TABLET | Refills: 0 | Status: SHIPPED | OUTPATIENT
Start: 2024-11-08 | End: 2024-11-09

## 2024-11-08 NOTE — TELEPHONE ENCOUNTER
Regarding: yeast infection  ----- Message from Rosie MORENO sent at 11/8/2024 11:04 AM EST -----  Pt called in and stated that she has a yeast infection, tried monistat

## 2024-11-08 NOTE — TELEPHONE ENCOUNTER
"Incoming call from patient. Patient has been taking antibiotic - believes she now has yeast infection. Symptoms started last week and include vaginal itching and white vaginal discharge. Denies odor. States this feels like prior yeast infection.  Diflucan x1 sent in to pharmacy. Advised patient to contact office if symptoms persist after treatment as office visit may be required. Patient verbalized understanding.     \"How many yeast infections have you had in the past 2 years?\"    -If 1 or less, prescribe Diflucan 150mg PO. If no improvement after 1 dose treatment, please instruct patient to call back to schedule appointment. (should be seen within 3 days)    -If more than 4 or more yeast infections in a year or if they are recurrent, schedule appointment within 3 days.    For OB patients: if patient wishes to use over the counter monistat, recommend only the 7 day treatment.     Reason for Disposition   Symptoms of a yeast infection (i.e., itchy, white discharge, not bad smelling) and feels like prior vaginal yeast infections    Answer Assessment - Initial Assessment Questions  1. SYMPTOM: \"What's the main symptom you're concerned about?\" (e.g., pain, itching, dryness)      Vaginal itching, white vaginal discharge. Denies vaginal odor.   2. LOCATION: \"Where is the  symptoms located?\" (e.g., inside/outside, left/right)      vagina  3. ONSET: \"When did the  symptoms  start?\"      Last week  4. PAIN: \"Is there any pain?\" If Yes, ask: \"How bad is it?\" (Scale: 1-10; mild, moderate, severe)      denies  5. ITCHING: \"Is there any itching?\" If Yes, ask: \"How bad is it?\" (Scale: 1-10; mild, moderate, severe)      Yes, itching  6. CAUSE: \"What do you think is causing the discharge?\" \"Have you had the same problem before?\" \"What happened then?\"      Yeast infection  7. OTHER SYMPTOMS: \"Do you have any other symptoms?\" (e.g., fever, itching, vaginal bleeding, pain with urination, injury to genital area, vaginal foreign body)   " "   denies  8. PREGNANCY: \"Is there any chance you are pregnant?\" \"When was your last menstrual period?\"      Lmp 10/21/24    Protocols used: Vaginal Symptoms-Adult-[place]    "

## 2024-12-02 ENCOUNTER — OFFICE VISIT (OUTPATIENT)
Dept: ENDOCRINOLOGY | Facility: CLINIC | Age: 24
End: 2024-12-02
Payer: COMMERCIAL

## 2024-12-02 VITALS
BODY MASS INDEX: 31.88 KG/M2 | WEIGHT: 198.4 LBS | DIASTOLIC BLOOD PRESSURE: 82 MMHG | OXYGEN SATURATION: 98 % | SYSTOLIC BLOOD PRESSURE: 120 MMHG | HEART RATE: 52 BPM | HEIGHT: 66 IN

## 2024-12-02 DIAGNOSIS — R79.89 LOW TSH LEVEL: ICD-10-CM

## 2024-12-02 DIAGNOSIS — E05.90 SUBCLINICAL HYPERTHYROIDISM: Primary | ICD-10-CM

## 2024-12-02 DIAGNOSIS — R79.89 ELEVATED PROLACTIN LEVEL: ICD-10-CM

## 2024-12-02 PROCEDURE — 99244 OFF/OP CNSLTJ NEW/EST MOD 40: CPT | Performed by: INTERNAL MEDICINE

## 2024-12-02 NOTE — PROGRESS NOTES
Chief Complaint   Patient presents with    Abnormal Endocrine Labs      Referring Provider  Barbie Waters Md  [address]  [address]  [place] [address]     History of Present Illness:   Shelli Casanova is a 24 y.o. female with abnormal TSH and elevated prolactin seen in consultation at the request of Dr. Barbie Waters. Her partner accompanies her for support.   Today she is seen for labs done by Dr. Waters for an infertility evaluation. She has infertility for 3 years. She had one pregnancy that ended in miscarriage, but no other pregnancies.   There is a history of anxiety, but she does not take any prescription medications. She notes palpitations with her anxiety but denies tremor, diarrhea, or changes in the hair/skin/nails. Some changes in menses, but still monthly.     Denies changes in neck, changes in voice, dysphagia. Denies radiation exposure to head/neck/chest  No Medications impacting the thyroid. Currently taking only a PNV and denies supplements. Will use cannabois for sleep.      Underwent a Salpingogram w/omnipaque dye 9/30/2024, TSH drawn 10/16/2024 was low. However, the TSH from 12/2023 was also low.     Has an elevated prolactin. Denies denies nipple discharge or side vision issues. Has some breast tenderness during her menses.       Fhx:   thyroid disorder- mother with possible thyroid issues. Had a neck surgery in 2023    Patient Active Problem List   Diagnosis    BMI 33.0-33.9,adult    GERD (gastroesophageal reflux disease)    Anxiety    Low TSH level    Elevated prolactin level      Past Medical History:   Diagnosis Date    Asthma     Chlamydia 2022    treated    Depression     GERD (gastroesophageal reflux disease)     Postoperative wound infection 4/28/2023    big abscess      Past Surgical History:   Procedure Laterality Date    NO PAST SURGERIES        Family History   Problem Relation Age of Onset    Thyroid disease Mother     Cancer Father         Hodgkin's    Hodgkin's  "lymphoma Father     Breast cancer Neg Hx     Colon cancer Neg Hx     Ovarian cancer Neg Hx      Social History     Tobacco Use    Smoking status: Never     Passive exposure: Never    Smokeless tobacco: Never   Substance Use Topics    Alcohol use: Yes     Alcohol/week: 2.0 standard drinks of alcohol     Types: 2 Shots of liquor per week     Comment: couple times/month     No Known Allergies      Current Outpatient Medications:     Prenatal Vit-Fe Fumarate-FA (Prenatal Plus Vitamin/Mineral) 27-1 MG TABS, Take 1 tablet by mouth in the morning, Disp: 90 tablet, Rfl: 4  Review of Systems   HENT:  Positive for congestion. Negative for voice change.    Eyes:  Negative for visual disturbance.   Respiratory:  Negative for cough and shortness of breath.    Cardiovascular:  Negative for chest pain and palpitations.   Gastrointestinal:  Positive for nausea and vomiting. Negative for constipation and diarrhea.   Endocrine: Positive for cold intolerance and polydipsia.   Genitourinary:  Positive for menstrual problem (some later).   Musculoskeletal:  Negative for gait problem.   Neurological:  Negative for tremors.        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    See also HPI    Physical Exam:  Body mass index is 32.02 kg/m².  /82   Pulse (!) 52   Ht 5' 6\" (1.676 m)   Wt 90 kg (198 lb 6.4 oz)   LMP 10/21/2024 (Exact Date)   SpO2 98%   BMI 32.02 kg/m²    Wt Readings from Last 3 Encounters:   12/02/24 90 kg (198 lb 6.4 oz)   11/04/24 91.6 kg (202 lb)   05/28/24 95 kg (209 lb 8 oz)       GEN: NAD  E/n/m nl facies, hearing intact bilat, tongue midline, lips nl  Eyes: no stare or proptosis, nl lids , EOMI  Neck: trachea midline, thyroid NT to palpation, nl in size, no nodules or neck masses noted, no cervical LAD  CV; heart reg rate s1s2 nl, no m/r/g appreciated   Resp: CTAB, good effort  Ab+BS  Neuro: no tremor, 2+ DTRs in RUE  MS: no c/c in " digits, moves all 4 ext, nl muscle bulk, gait nl  Skin: warm and dry, no palmar erythemaPsych: nl mood and affect, no gross lapses in memory    DATA:  Labs:   Lab Results   Component Value Date    [id number] 0.169 (L) 10/13/2024    TSH 0.72 06/29/2023           Radiology    Impression/Plan:    Problem List Items Addressed This Visit       Low TSH level    Her TSH is low and was also low in 2023. However, she had a contrast load 2 weeks before the Oct 2024 test. Will repeat TSH along with Free T4, total T3, and TSII. If the tsh is still low and antibodies negative, then she would need an I-123 uptake and scan. If treatment is indicated, then it would be medication as she is hoping for a pregnancy         Elevated prolactin level    Prolactin is mildly elevated but she is asymptomatic. Asked that she avoid cannabis and activities that would stimulate the chest wall the night before and morning of the test. If elevated, dicussed need for MRI         Relevant Orders    Prolactin     Other Visit Diagnoses         Subclinical hyperthyroidism    -  Primary    Relevant Orders    TSH, 3rd generation    T4, free    T3    Thyroid stimulating immunoglobulin                   Discussed with the patient and all questioned fully answered. She will call me if any problems arise.        Naz Tavares MD

## 2024-12-02 NOTE — ASSESSMENT & PLAN NOTE
Prolactin is mildly elevated but she is asymptomatic. Asked that she avoid cannabis and activities that would stimulate the chest wall the night before and morning of the test. If elevated, dicussed need for MRI

## 2024-12-02 NOTE — ASSESSMENT & PLAN NOTE
Her TSH is low and was also low in 2023. However, she had a contrast load 2 weeks before the Oct 2024 test. Will repeat TSH along with Free T4, total T3, and TSII. If the tsh is still low and antibodies negative, then she would need an I-123 uptake and scan. If treatment is indicated, then it would be medication as she is hoping for a pregnancy

## 2024-12-04 ENCOUNTER — APPOINTMENT (OUTPATIENT)
Dept: LAB | Facility: HOSPITAL | Age: 24
End: 2024-12-04
Payer: COMMERCIAL

## 2024-12-04 LAB
PROLACTIN SERPL-MCNC: 28.07 NG/ML (ref 3.34–26.72)
T3 SERPL-MCNC: 1.1 NG/ML (ref 0.9–1.8)
T4 FREE SERPL-MCNC: 0.85 NG/DL (ref 0.61–1.12)
TSH SERPL DL<=0.05 MIU/L-ACNC: 0.43 UIU/ML (ref 0.45–4.5)

## 2024-12-04 PROCEDURE — 36415 COLL VENOUS BLD VENIPUNCTURE: CPT | Performed by: INTERNAL MEDICINE

## 2024-12-04 PROCEDURE — 84480 ASSAY TRIIODOTHYRONINE (T3): CPT | Performed by: INTERNAL MEDICINE

## 2024-12-04 PROCEDURE — 84445 ASSAY OF TSI GLOBULIN: CPT | Performed by: INTERNAL MEDICINE

## 2024-12-04 PROCEDURE — 84439 ASSAY OF FREE THYROXINE: CPT | Performed by: INTERNAL MEDICINE

## 2024-12-04 PROCEDURE — 84146 ASSAY OF PROLACTIN: CPT | Performed by: INTERNAL MEDICINE

## 2024-12-04 PROCEDURE — 84443 ASSAY THYROID STIM HORMONE: CPT | Performed by: INTERNAL MEDICINE

## 2024-12-06 DIAGNOSIS — E05.90 HYPERTHYROIDISM: Primary | ICD-10-CM

## 2024-12-06 LAB — TSI SER-ACNC: <0.1 IU/L (ref 0–0.55)

## 2024-12-20 ENCOUNTER — TELEPHONE (OUTPATIENT)
Dept: OBGYN CLINIC | Facility: CLINIC | Age: 24
End: 2024-12-20

## 2024-12-26 ENCOUNTER — HOSPITAL ENCOUNTER (EMERGENCY)
Facility: HOSPITAL | Age: 24
Discharge: HOME | End: 2024-12-26
Attending: EMERGENCY MEDICINE
Payer: COMMERCIAL

## 2024-12-26 VITALS
RESPIRATION RATE: 16 BRPM | SYSTOLIC BLOOD PRESSURE: 99 MMHG | HEART RATE: 70 BPM | WEIGHT: 197.31 LBS | OXYGEN SATURATION: 99 % | TEMPERATURE: 98 F | BODY MASS INDEX: 31.85 KG/M2 | DIASTOLIC BLOOD PRESSURE: 75 MMHG

## 2024-12-26 DIAGNOSIS — L05.91 PILONIDAL CYST: Primary | ICD-10-CM

## 2024-12-26 PROCEDURE — 99282 EMERGENCY DEPT VISIT SF MDM: CPT

## 2024-12-26 PROCEDURE — 99284 EMERGENCY DEPT VISIT MOD MDM: CPT | Performed by: EMERGENCY MEDICINE

## 2024-12-26 RX ORDER — CEPHALEXIN 500 MG/1
500 CAPSULE ORAL EVERY 6 HOURS SCHEDULED
Qty: 28 CAPSULE | Refills: 0 | Status: SHIPPED | OUTPATIENT
Start: 2024-12-26 | End: 2025-01-02

## 2024-12-26 RX ORDER — METRONIDAZOLE 500 MG/1
500 TABLET ORAL ONCE
Status: COMPLETED | OUTPATIENT
Start: 2024-12-26 | End: 2024-12-26

## 2024-12-26 RX ORDER — HYDROCODONE BITARTRATE AND ACETAMINOPHEN 5; 325 MG/1; MG/1
1 TABLET ORAL EVERY 6 HOURS PRN
Qty: 7 TABLET | Refills: 0 | Status: SHIPPED | OUTPATIENT
Start: 2024-12-26

## 2024-12-26 RX ORDER — FLUCONAZOLE 200 MG/1
200 TABLET ORAL DAILY
Qty: 1 TABLET | Refills: 0 | Status: SHIPPED | OUTPATIENT
Start: 2024-12-26 | End: 2024-12-27

## 2024-12-26 RX ORDER — NAPROXEN 250 MG/1
500 TABLET ORAL ONCE
Status: COMPLETED | OUTPATIENT
Start: 2024-12-26 | End: 2024-12-26

## 2024-12-26 RX ORDER — OXYCODONE AND ACETAMINOPHEN 5; 325 MG/1; MG/1
1 TABLET ORAL ONCE
Refills: 0 | Status: COMPLETED | OUTPATIENT
Start: 2024-12-26 | End: 2024-12-26

## 2024-12-26 RX ORDER — METRONIDAZOLE 500 MG/1
500 TABLET ORAL EVERY 8 HOURS SCHEDULED
Qty: 21 TABLET | Refills: 0 | Status: SHIPPED | OUTPATIENT
Start: 2024-12-26 | End: 2025-01-02

## 2024-12-26 RX ADMIN — NAPROXEN 500 MG: 250 TABLET ORAL at 17:31

## 2024-12-26 RX ADMIN — CEPHALEXIN 500 MG: 250 CAPSULE ORAL at 17:31

## 2024-12-26 RX ADMIN — METRONIDAZOLE 500 MG: 500 TABLET ORAL at 17:31

## 2024-12-26 RX ADMIN — OXYCODONE HYDROCHLORIDE AND ACETAMINOPHEN 1 TABLET: 5; 325 TABLET ORAL at 17:31

## 2024-12-26 NOTE — PATIENT COMMUNICATION
PT called in regarding return of pilonidal cyst. This is the 3rd time that the cyst has occurred. PT reports being told that if it comes back a 3rd time, then she should need surgery to remove sac. PT reports pilonidal cyst returned a few days ago; reports it is a ball that is swollen, warm, and is constantly causing pain. PT denies fevers/chills. PT debating if she should go to ED for I&D and wondering how soon she can have surgery.    PT also has leftover antibiotics from prior recurrence. PT reports that she stopped talking them because antibiotics cause her to have severe yeast infections. Education provided on importance of completing prescribed antibiotics and instructed PT to not take leftover antibiotic at this time. PT verbalized understanding and agreeable to plan.    No available appointments until next week. Called [facility] office clinical backline and spoke with Lamar. Per Lamar, PT would need to have infection cleared s/p I&D before she can have surgery; PT may schedule office visit to discuss surgery.    Updated PT. PT will go to ED either today or tomorrow. Office visit scheduled.

## 2024-12-26 NOTE — Clinical Note
Shelli Casanova was seen and treated in our emergency department on 12/26/2024.                Diagnosis:     Shelli  may return to work on return date.    She may return on this date: 12/31/2024         If you have any questions or concerns, please don't hesitate to call.      Ishan White MD    ______________________________           _______________          _______________  Hospital Representative                              Date                                Time

## 2024-12-26 NOTE — ED PROVIDER NOTES
Time reflects when diagnosis was documented in both MDM as applicable and the Disposition within this note       Time User Action Codes Description Comment    12/26/2024  5:59 PM Ishan White Add [L05.91] Pilonidal cyst           ED Disposition       ED Disposition   Discharge    Condition   Stable    Date/Time   Thu Dec 26, 2024  5:59 PM    Comment   Shelli Casanova discharge to home/self care.                   Assessment & Plan   {Hyperlinks  Risk Stratification - NIHSS - HEART SCORE - Fill out sepsis note and make sure you call [id number] if severe or septic shock:[id number]}    Medical Decision Making  Risk  Prescription drug management.             Medications   cephalexin (KEFLEX) capsule 500 mg (500 mg Oral Given 12/26/24 1731)   metroNIDAZOLE (FLAGYL) tablet 500 mg (500 mg Oral Given 12/26/24 1731)   oxyCODONE-acetaminophen (PERCOCET) 5-325 mg per tablet 1 tablet (1 tablet Oral Given 12/26/24 1731)   naproxen (NAPROSYN) tablet 500 mg (500 mg Oral Given 12/26/24 1731)       ED Risk Strat Scores                          SBIRT 20yo+      Flowsheet Row Most Recent Value   Initial Alcohol Screen: US AUDIT-C     1. How often do you have a drink containing alcohol? 0 Filed at: 12/26/2024 1630   2. How many drinks containing alcohol do you have on a typical day you are drinking?  0 Filed at: 12/26/2024 1630   3a. Male UNDER 65: How often do you have five or more drinks on one occasion? 0 Filed at: 12/26/2024 1630   3b. FEMALE Any Age, or MALE 65+: How often do you have 4 or more drinks on one occassion? 0 Filed at: 12/26/2024 1630   Audit-C Score 0 Filed at: 12/26/2024 1630   CELESTINE: How many times in the past year have you...    Used an illegal drug or used a prescription medication for non-medical reasons? Never Filed at: 12/26/2024 1630                            History of Present Illness   {Hyperlinks  History (Med, Surg, Fam, Social) - Current Medications - Allergies  :[id number]}    Chief Complaint   Patient  presents with    Cyst     Pt reports a flare up of a pilonidal cyst. Pt last needed antibiotics for it in November. Pt reports this flare up started on 12/23/24. POt reports no drainage.          Past Medical History:   Diagnosis Date    Asthma     Chlamydia 2022    treated    Depression     GERD (gastroesophageal reflux disease)     Postoperative wound infection 4/28/2023    big abscess      Past Surgical History:   Procedure Laterality Date    NO PAST SURGERIES        Family History   Problem Relation Age of Onset    Thyroid disease Mother     Cancer Father         Hodgkin's    Hodgkin's lymphoma Father     Breast cancer Neg Hx     Colon cancer Neg Hx     Ovarian cancer Neg Hx       Social History     Tobacco Use    Smoking status: Never     Passive exposure: Never    Smokeless tobacco: Never   Vaping Use    Vaping status: Some Days    Substances: Nicotine, Flavoring   Substance Use Topics    Alcohol use: Yes     Alcohol/week: 2.0 standard drinks of alcohol     Types: 2 Shots of liquor per week     Comment: couple times/month    Drug use: Yes     Frequency: 7.0 times per week     Types: Marijuana     Comment: daily marijuana use      E-Cigarette/Vaping    E-Cigarette Use Current Some Day User       E-Cigarette/Vaping Substances    Nicotine Yes     THC No     CBD No     Flavoring Yes       I have reviewed and agree with the history as documented.     HPI    Review of Systems        Objective   {Hyperlinks  Historical Vitals - Historical Labs - Chart Review/Microbiology - Last Echo - Code Status  :[id number]}    ED Triage Vitals   Temperature Pulse Blood Pressure Respirations SpO2 Patient Position - Orthostatic VS   12/26/24 1604 12/26/24 1601 12/26/24 1601 12/26/24 1601 12/26/24 1601 12/26/24 1601   98 °F (36.7 °C) 70 99/75 16 99 % Sitting      Temp Source Heart Rate Source BP Location FiO2 (%) Pain Score    12/26/24 1604 12/26/24 1601 12/26/24 1601 -- 12/26/24 1601    Oral Monitor Right arm  9      Vitals       Date and Time Temp Pulse SpO2 Resp BP Pain Score FACES Pain Rating User   12/26/24 1758 -- -- -- -- -- 3 -- CF   12/26/24 1731 -- -- -- -- -- 8 -- CF   12/26/24 1604 98 °F (36.7 °C) -- -- -- -- -- -- GG   12/26/24 1601 -- 70 99 % 16 99/75 9 -- GG            Physical Exam    Results Reviewed       None            No orders to display       Procedures    ED Medication and Procedure Management   Prior to Admission Medications   Prescriptions Last Dose Informant Patient Reported? Taking?   Prenatal Vit-Fe Fumarate-FA (Prenatal Plus Vitamin/Mineral) 27-1 MG TABS  Self No No   Sig: Take 1 tablet by mouth in the morning      Facility-Administered Medications: None     Patient's Medications   Discharge Prescriptions    CEPHALEXIN (KEFLEX) 500 MG CAPSULE    Take 1 capsule (500 mg total) by mouth every 6 (six) hours for 7 days       Start Date: 12/26/2024End Date: 1/2/2025       Order Dose: 500 mg       Quantity: 28 capsule    Refills: 0    FLUCONAZOLE (DIFLUCAN) 200 MG TABLET    Take 1 tablet (200 mg total) by mouth daily for 1 dose       Start Date: 12/26/2024End Date: 12/27/2024       Order Dose: 200 mg       Quantity: 1 tablet    Refills: 0    HYDROCODONE-ACETAMINOPHEN (NORCO) 5-325 MG PER TABLET    Take 1 tablet by mouth every 6 (six) hours as needed for pain for up to 7 doses Max Daily Amount: 4 tablets       Start Date: 12/26/2024End Date: --       Order Dose: 1 tablet       Quantity: 7 tablet    Refills: 0    METRONIDAZOLE (FLAGYL) 500 MG TABLET    Take 1 tablet (500 mg total) by mouth every 8 (eight) hours for 7 days       Start Date: 12/26/2024End Date: 1/2/2025       Order Dose: 500 mg       Quantity: 21 tablet    Refills: 0     No discharge procedures on file.  ED SEPSIS DOCUMENTATION   Time reflects when diagnosis was documented in both MDM as applicable and the Disposition within this note       Time User Action Codes Description Comment    12/26/2024  5:59 PM Ishan White Add [L05.91] Pilonidal cyst   Until Thu 1/2/2025, Normal      fluconazole (DIFLUCAN) 200 mg tablet Take 1 tablet (200 mg total) by mouth daily for 1 dose, Starting Thu 12/26/2024, Until Fri 12/27/2024, Normal      metroNIDAZOLE (FLAGYL) 500 mg tablet Take 1 tablet (500 mg total) by mouth every 8 (eight) hours for 7 days, Starting Thu 12/26/2024, Until Thu 1/2/2025, Normal      HYDROcodone-acetaminophen (Norco) 5-325 mg per tablet Take 1 tablet by mouth every 6 (six) hours as needed for pain for up to 7 doses Max Daily Amount: 4 tablets, Starting Thu 12/26/2024, Normal           CONTINUE these medications which have NOT CHANGED    Details   Prenatal Vit-Fe Fumarate-FA (Prenatal Plus Vitamin/Mineral) 27-1 MG TABS Take 1 tablet by mouth in the morning, Starting Wed 7/31/2024, Normal           No discharge procedures on file.  ED SEPSIS DOCUMENTATION   Time reflects when diagnosis was documented in both MDM as applicable and the Disposition within this note       Time User Action Codes Description Comment    12/26/2024  5:59 PM Ishan White Add [L05.91] Pilonidal cyst                  Ishan White MD  01/11/25 1216

## 2024-12-27 ENCOUNTER — HOSPITAL ENCOUNTER (EMERGENCY)
Facility: HOSPITAL | Age: 24
Discharge: HOME | End: 2024-12-28
Attending: EMERGENCY MEDICINE
Payer: COMMERCIAL

## 2024-12-27 DIAGNOSIS — L05.01 PILONIDAL ABSCESS: ICD-10-CM

## 2024-12-27 DIAGNOSIS — R55 SYNCOPE: Primary | ICD-10-CM

## 2024-12-27 PROCEDURE — 99284 EMERGENCY DEPT VISIT MOD MDM: CPT

## 2024-12-28 ENCOUNTER — APPOINTMENT (EMERGENCY)
Dept: CT IMAGING | Facility: HOSPITAL | Age: 24
End: 2024-12-28
Payer: COMMERCIAL

## 2024-12-28 ENCOUNTER — APPOINTMENT (EMERGENCY)
Dept: RADIOLOGY | Facility: HOSPITAL | Age: 24
End: 2024-12-28
Payer: COMMERCIAL

## 2024-12-28 VITALS
TEMPERATURE: 97.5 F | SYSTOLIC BLOOD PRESSURE: 97 MMHG | BODY MASS INDEX: 32.24 KG/M2 | HEART RATE: 88 BPM | RESPIRATION RATE: 18 BRPM | OXYGEN SATURATION: 100 % | DIASTOLIC BLOOD PRESSURE: 52 MMHG | WEIGHT: 199.74 LBS

## 2024-12-28 LAB
ALBUMIN SERPL BCG-MCNC: 4.2 G/DL (ref 3.5–5)
ALP SERPL-CCNC: 68 U/L (ref 34–104)
ALT SERPL W P-5'-P-CCNC: 9 U/L (ref 7–52)
ANION GAP SERPL CALCULATED.3IONS-SCNC: 7 MMOL/L (ref 4–13)
AST SERPL W P-5'-P-CCNC: 12 U/L (ref 13–39)
ATRIAL RATE: 94 BPM
BACTERIA UR QL AUTO: ABNORMAL /HPF
BASOPHILS # BLD AUTO: 0.05 THOUSANDS/ÂΜL (ref 0–0.1)
BASOPHILS NFR BLD AUTO: 0 % (ref 0–1)
BILIRUB SERPL-MCNC: 0.24 MG/DL (ref 0.2–1)
BILIRUB UR QL STRIP: NEGATIVE
BUN SERPL-MCNC: 18 MG/DL (ref 5–25)
CALCIUM SERPL-MCNC: 9.1 MG/DL (ref 8.4–10.2)
CHLORIDE SERPL-SCNC: 106 MMOL/L (ref 96–108)
CK SERPL-CCNC: 55 U/L (ref 26–192)
CLARITY UR: CLEAR
CO2 SERPL-SCNC: 28 MMOL/L (ref 21–32)
COLOR UR: YELLOW
CREAT SERPL-MCNC: 0.96 MG/DL (ref 0.6–1.3)
EOSINOPHIL # BLD AUTO: 0.44 THOUSAND/ÂΜL (ref 0–0.61)
EOSINOPHIL NFR BLD AUTO: 3 % (ref 0–6)
ERYTHROCYTE [DISTWIDTH] IN BLOOD BY AUTOMATED COUNT: 13.4 % (ref 11.6–15.1)
EXT PREGNANCY TEST URINE: NEGATIVE
EXT. CONTROL: NORMAL
GFR SERPL CREATININE-BSD FRML MDRD: 84 ML/MIN/1.73SQ M
GLUCOSE SERPL-MCNC: 92 MG/DL (ref 65–140)
GLUCOSE UR STRIP-MCNC: NEGATIVE MG/DL
HCT VFR BLD AUTO: 40.1 % (ref 34.8–46.1)
HGB BLD-MCNC: 12.9 G/DL (ref 11.5–15.4)
HGB UR QL STRIP.AUTO: NEGATIVE
IMM GRANULOCYTES # BLD AUTO: 0.05 THOUSAND/UL (ref 0–0.2)
IMM GRANULOCYTES NFR BLD AUTO: 0 % (ref 0–2)
KETONES UR STRIP-MCNC: ABNORMAL MG/DL
LEUKOCYTE ESTERASE UR QL STRIP: ABNORMAL
LYMPHOCYTES # BLD AUTO: 2.96 THOUSANDS/ÂΜL (ref 0.6–4.47)
LYMPHOCYTES NFR BLD AUTO: 18 % (ref 14–44)
MCH RBC QN AUTO: 28.9 PG (ref 26.8–34.3)
MCHC RBC AUTO-ENTMCNC: 32.2 G/DL (ref 31.4–37.4)
MCV RBC AUTO: 90 FL (ref 82–98)
MONOCYTES # BLD AUTO: 0.94 THOUSAND/ÂΜL (ref 0.17–1.22)
MONOCYTES NFR BLD AUTO: 6 % (ref 4–12)
MUCOUS THREADS UR QL AUTO: ABNORMAL
NEUTROPHILS # BLD AUTO: 11.78 THOUSANDS/ÂΜL (ref 1.85–7.62)
NEUTS SEG NFR BLD AUTO: 73 % (ref 43–75)
NITRITE UR QL STRIP: NEGATIVE
NON-SQ EPI CELLS URNS QL MICRO: ABNORMAL /HPF
NRBC BLD AUTO-RTO: 0 /100 WBCS
P AXIS: 47 DEGREES
PH UR STRIP.AUTO: 6.5 [PH]
PLATELET # BLD AUTO: 330 THOUSANDS/UL (ref 149–390)
PMV BLD AUTO: 9.6 FL (ref 8.9–12.7)
POTASSIUM SERPL-SCNC: 3.9 MMOL/L (ref 3.5–5.3)
PR INTERVAL: 150 MS
PROT SERPL-MCNC: 7.2 G/DL (ref 6.4–8.4)
PROT UR STRIP-MCNC: NEGATIVE MG/DL
QRS AXIS: 99 DEGREES
QRSD INTERVAL: 86 MS
QT INTERVAL: 326 MS
QTC INTERVAL: 407 MS
RBC # BLD AUTO: 4.47 MILLION/UL (ref 3.81–5.12)
RBC #/AREA URNS AUTO: ABNORMAL /HPF
SODIUM SERPL-SCNC: 141 MMOL/L (ref 135–147)
SP GR UR STRIP.AUTO: 1.02 (ref 1–1.03)
T WAVE AXIS: 48 DEGREES
UROBILINOGEN UR STRIP-ACNC: <2 MG/DL
VENTRICULAR RATE: 94 BPM
WBC # BLD AUTO: 16.22 THOUSAND/UL (ref 4.31–10.16)
WBC #/AREA URNS AUTO: ABNORMAL /HPF

## 2024-12-28 PROCEDURE — 72220 X-RAY EXAM SACRUM TAILBONE: CPT

## 2024-12-28 PROCEDURE — 93010 ELECTROCARDIOGRAM REPORT: CPT | Performed by: STUDENT IN AN ORGANIZED HEALTH CARE EDUCATION/TRAINING PROGRAM

## 2024-12-28 PROCEDURE — 81001 URINALYSIS AUTO W/SCOPE: CPT

## 2024-12-28 PROCEDURE — 82550 ASSAY OF CK (CPK): CPT

## 2024-12-28 PROCEDURE — 81025 URINE PREGNANCY TEST: CPT

## 2024-12-28 PROCEDURE — 72125 CT NECK SPINE W/O DYE: CPT

## 2024-12-28 PROCEDURE — 80053 COMPREHEN METABOLIC PANEL: CPT

## 2024-12-28 PROCEDURE — 96374 THER/PROPH/DIAG INJ IV PUSH: CPT

## 2024-12-28 PROCEDURE — 36415 COLL VENOUS BLD VENIPUNCTURE: CPT

## 2024-12-28 PROCEDURE — 93005 ELECTROCARDIOGRAM TRACING: CPT

## 2024-12-28 PROCEDURE — 99285 EMERGENCY DEPT VISIT HI MDM: CPT

## 2024-12-28 PROCEDURE — 85025 COMPLETE CBC W/AUTO DIFF WBC: CPT

## 2024-12-28 PROCEDURE — 70450 CT HEAD/BRAIN W/O DYE: CPT

## 2024-12-28 RX ORDER — ACETAMINOPHEN 325 MG/1
975 TABLET ORAL ONCE
Status: COMPLETED | OUTPATIENT
Start: 2024-12-28 | End: 2024-12-28

## 2024-12-28 RX ORDER — KETOROLAC TROMETHAMINE 30 MG/ML
15 INJECTION, SOLUTION INTRAMUSCULAR; INTRAVENOUS ONCE
Status: COMPLETED | OUTPATIENT
Start: 2024-12-28 | End: 2024-12-28

## 2024-12-28 RX ADMIN — KETOROLAC TROMETHAMINE 15 MG: 30 INJECTION, SOLUTION INTRAMUSCULAR; INTRAVENOUS at 02:53

## 2024-12-28 RX ADMIN — ACETAMINOPHEN 975 MG: 325 TABLET, FILM COATED ORAL at 02:04

## 2024-12-28 NOTE — ED PROVIDER NOTES
Time reflects when diagnosis was documented in both MDM as applicable and the Disposition within this note       Time User Action Codes Description Comment    12/28/2024  2:36 AM Radha Anaya [R55] Syncope     12/28/2024  2:37 AM Radha Anaya Add [L05.01] Pilonidal abscess           ED Disposition       ED Disposition   Discharge    Condition   Stable    Date/Time   Sat Dec 28, 2024  2:36 AM    Comment   Shelli Casanova discharge to home/self care.                   Assessment & Plan       Medical Decision Making  Differential diagnosis including but not limited to: vasovagal syncope, cardiac arrhythmia, metabolic abnormality, intracranial process, anemia, dehydration, ectopic pregnancy, adverse effect.    Will obtain EKG.  Will obtain CTH, CT cervical spine, XR sacrum.  Will obtain CBC to evaluate for leukocytosis, anemia.  Will obtain CMP to evaluate kidney function, for electrolyte disturbance.  Will obtain UA to evaluate for UTI. Will obtain urine pregnancy.      EKG without acute ischemic changes.  Patient with leukocytosis, nonspecific suspect may be reactive to syncope, trauma.  Labs otherwise without actionable derangement.  Imaging without acute abnormality.  Offered to attempt incision and drainage for pilonidal abscess, however patient declined and would like to continue taking antibiotics.  She has been on them for less than 48 hours at this time, therefore think this is reasonable.  I had an extensive discussion with the patient regarding avoiding alcohol use with Flagyl, as well as Norco.  Suspect patient's syncope and symptoms may have been related to combination.  She verbalized understanding agreement.  She remains resting comfortably, no acute distress.  Vital signs stable.  Will discharge    At the time of discharge, the patient is in no acute distress. I discussed with the patient the diagnosis, treatment plan, follow-up, return precautions, and discharge instructions; they were  "given the opportunity to ask questions and verbalized understanding.      Problems Addressed:  Pilonidal abscess: acute illness or injury  Syncope: acute illness or injury    Amount and/or Complexity of Data Reviewed  External Data Reviewed: labs, radiology and notes.  Labs: ordered. Decision-making details documented in ED Course.  Radiology: ordered and independent interpretation performed. Decision-making details documented in ED Course.  ECG/medicine tests: ordered and independent interpretation performed. Decision-making details documented in ED Course.    Risk  OTC drugs.  Prescription drug management.        ED Course as of 12/28/24 0313   Sat Dec 28, 2024   0045 ECG 12 lead  EKG normal sinus rhythm at 94 bpm, RAD, , QTc 407, no ST elevation or depression as interpreted by me       Medications   acetaminophen (TYLENOL) tablet 975 mg (975 mg Oral Given 12/28/24 0204)   ketorolac (TORADOL) injection 15 mg (15 mg Intravenous Given 12/28/24 0253)       ED Risk Strat Scores                    History of Present Illness       Chief Complaint   Patient presents with    Syncope     Patient states she was drank 3 shots and got up to go to the bathroom \"blacked out\" and fell. Patient states she hit her head and buttocks. Unsure of LOC. Denies blood thinners.       Past Medical History:   Diagnosis Date    Asthma     Chlamydia 2022    treated    Depression     GERD (gastroesophageal reflux disease)     Postoperative wound infection 4/28/2023    big abscess      Past Surgical History:   Procedure Laterality Date    NO PAST SURGERIES        Family History   Problem Relation Age of Onset    Thyroid disease Mother     Cancer Father         Hodgkin's    Hodgkin's lymphoma Father     Breast cancer Neg Hx     Colon cancer Neg Hx     Ovarian cancer Neg Hx       Social History     Tobacco Use    Smoking status: Never     Passive exposure: Never    Smokeless tobacco: Never   Vaping Use    Vaping status: Some Days    " "Substances: Nicotine, Flavoring   Substance Use Topics    Alcohol use: Yes     Alcohol/week: 2.0 standard drinks of alcohol     Types: 2 Shots of liquor per week     Comment: couple times/month    Drug use: Yes     Frequency: 7.0 times per week     Types: Marijuana     Comment: daily marijuana use      E-Cigarette/Vaping    E-Cigarette Use Current Some Day User       E-Cigarette/Vaping Substances    Nicotine Yes     THC No     CBD No     Flavoring Yes       I have reviewed and agree with the history as documented.     The patient is a 24-year-old female with history of asthma, depression, GERD, pilonidal abscess who presents to the ED for evaluation following a syncopal episode.  She reports that she was taking Flagyl as prescribed, as well as Norco after she was seen in the ED yesterday for a pilonidal abscess.  She opted to not have an incision and drainage, and was trialing oral antibiotics as this has worked well for her in the past.  Tonight, she had 3 shots of liquor.  Following this, she developed nausea, and lightheadedness.  She reports she was going to the bathroom when she lost consciousness and syncopized.  She believes she was only unresponsive for \"seconds.\"  She has since had a headache, and reports mild neck pain.  She also reports that she is having pain on her tailbone, where her pilonidal cyst is, following this fall.  She otherwise denies chest pain, dyspnea, extremity injury, fever, chills, vomiting, anticoagulation, melena, hematochezia, dysuria, hematuria,  leg swelling, calf pain, exogenous estrogen, recent travel, recent surgery, hemoptysis, history of DVT/PE.        Review of Systems   Constitutional:  Negative for chills and fever.   HENT:  Negative for congestion and rhinorrhea.    Respiratory:  Negative for cough and shortness of breath.    Cardiovascular:  Negative for chest pain and leg swelling.   Gastrointestinal:  Positive for nausea. Negative for abdominal pain, constipation and " vomiting.   Genitourinary:  Negative for dysuria and flank pain.   Musculoskeletal:  Positive for back pain and neck pain.   Skin:  Negative for rash and wound.   Neurological:  Positive for dizziness, syncope and headaches. Negative for numbness.   Psychiatric/Behavioral:  Negative for behavioral problems.            Objective       ED Triage Vitals   Temperature Pulse Blood Pressure Respirations SpO2 Patient Position - Orthostatic VS   12/27/24 2305 12/27/24 2305 12/27/24 2305 12/27/24 2305 12/27/24 2305 12/27/24 2305   97.5 °F (36.4 °C) (!) 107 126/62 16 100 % Sitting      Temp Source Heart Rate Source BP Location FiO2 (%) Pain Score    12/27/24 2305 12/27/24 2305 12/27/24 2305 -- 12/28/24 0204    Oral Monitor Right arm  9      Vitals      Date and Time Temp Pulse SpO2 Resp BP Pain Score FACES Pain Rating User   12/28/24 0253 -- -- -- -- -- 8 -- TP   12/28/24 0220 -- 88 100 % 18 97/52 -- -- TP   12/28/24 0204 -- -- -- -- -- 9 -- BRB   12/27/24 2305 97.5 °F (36.4 °C) 107 100 % 16 126/62 -- -- [facility]            Physical Exam  Vitals and nursing note reviewed.   Constitutional:       General: She is not in acute distress.     Appearance: She is well-developed. She is not toxic-appearing.   HENT:      Head: Normocephalic and atraumatic.   Eyes:      Extraocular Movements: Extraocular movements intact.      Conjunctiva/sclera: Conjunctivae normal.      Pupils: Pupils are equal, round, and reactive to light.   Cardiovascular:      Rate and Rhythm: Normal rate and regular rhythm.      Heart sounds: No murmur heard.  Pulmonary:      Effort: Pulmonary effort is normal. No respiratory distress.      Breath sounds: Normal breath sounds.   Abdominal:      Palpations: Abdomen is soft.      Tenderness: There is no abdominal tenderness. There is no guarding or rebound.   Musculoskeletal:         General: No swelling, deformity or signs of injury.      Cervical back: Neck supple. Tenderness present.      Thoracic back: No bony  tenderness.      Lumbar back: No bony tenderness.      Right lower leg: No edema.      Left lower leg: No edema.   Skin:     General: Skin is warm and dry.      Capillary Refill: Capillary refill takes less than 2 seconds.      Comments: Gluteal cleft with small area of induration and TTP, no signs of significant fluctuance, no signs of surrounding erythema, no signs of bleeding, discharge, drainage.   Neurological:      Mental Status: She is alert and oriented to person, place, and time.      GCS: GCS eye subscore is 4. GCS verbal subscore is 5. GCS motor subscore is 6.      Cranial Nerves: Cranial nerves 2-12 are intact.      Sensory: Sensation is intact.      Motor: Motor function is intact.      Coordination: Coordination is intact.   Psychiatric:         Mood and Affect: Mood normal.         Results Reviewed       Procedure Component Value Units Date/Time    Urine Microscopic [[id number]]  (Abnormal) Collected: 12/28/24 0204    Lab Status: Final result Specimen: Urine, Other Updated: 12/28/24 0224     RBC, UA 1-2 /hpf      WBC, UA 4-10 /hpf      Epithelial Cells Moderate /hpf      Bacteria, UA None Seen /hpf      MUCUS THREADS Occasional    UA w Reflex to Microscopic w Reflex to Culture [[id number]]  (Abnormal) Collected: 12/28/24 0204    Lab Status: Final result Specimen: Urine, Other Updated: 12/28/24 0215     Color, UA Yellow     Clarity, UA Clear     Specific Gravity, UA 1.022     pH, UA 6.5     Leukocytes, UA Small     Nitrite, UA Negative     Protein, UA Negative mg/dl      Glucose, UA Negative mg/dl      Ketones, UA Trace mg/dl      Urobilinogen, UA <2.0 mg/dl      Bilirubin, UA Negative     Occult Blood, UA Negative    CK [[id number]]  (Normal) Collected: 12/28/24 0042    Lab Status: Final result Specimen: Blood from Arm, Right Updated: 12/28/24 0131     Total CK 55 U/L     POCT pregnancy, urine [[id number]]  (Normal) Collected: 12/28/24 0045    Lab Status: Final result Updated: 12/28/24 0118     EXT Preg  Test, Ur Negative     Control Valid    Comprehensive metabolic panel [[id number]]  (Abnormal) Collected: 12/28/24 0042    Lab Status: Final result Specimen: Blood from Arm, Right Updated: 12/28/24 0115     Sodium 141 mmol/L      Potassium 3.9 mmol/L      Chloride 106 mmol/L      CO2 28 mmol/L      ANION GAP 7 mmol/L      BUN 18 mg/dL      Creatinine 0.96 mg/dL      Glucose 92 mg/dL      Calcium 9.1 mg/dL      AST 12 U/L      ALT 9 U/L      Alkaline Phosphatase 68 U/L      Total Protein 7.2 g/dL      Albumin 4.2 g/dL      Total Bilirubin 0.24 mg/dL      eGFR 84 ml/min/1.73sq m     Narrative:      National Kid[employer] guidelines for Chronic Kidney Disease (CKD):     Stage 1 with normal or high GFR (GFR > 90 mL/min/1.73 square meters)    Stage 2 Mild CKD (GFR = 60-89 mL/min/1.73 square meters)    Stage 3A Moderate CKD (GFR = 45-59 mL/min/1.73 square meters)    Stage 3B Moderate CKD (GFR = 30-44 mL/min/1.73 square meters)    Stage 4 Severe CKD (GFR = 15-29 mL/min/1.73 square meters)    Stage 5 End Stage CKD (GFR <15 mL/min/1.73 square meters)  Note: GFR calculation is accurate only with a steady state creatinine    CBC and differential [[id number]]  (Abnormal) Collected: 12/28/24 0042    Lab Status: Final result Specimen: Blood from Arm, Right Updated: 12/28/24 0047     WBC 16.22 Thousand/uL      RBC 4.47 Million/uL      Hemoglobin 12.9 g/dL      Hematocrit 40.1 %      MCV 90 fL      MCH 28.9 pg      MCHC 32.2 g/dL      RDW 13.4 %      MPV 9.6 fL      Platelets 330 Thousands/uL      nRBC 0 /100 WBCs      Segmented % 73 %      Immature Grans % 0 %      Lymphocytes % 18 %      Monocytes % 6 %      Eosinophils Relative 3 %      Basophils Relative 0 %      Absolute Neutrophils 11.78 Thousands/µL      Absolute Immature Grans 0.05 Thousand/uL      Absolute Lymphocytes 2.96 Thousands/µL      Absolute Monocytes 0.94 Thousand/µL      Eosinophils Absolute 0.44 Thousand/µL      Basophils Absolute 0.05 Thousands/µL              XR sacrum and coccyx   ED Interpretation by Radha Anaya PA-C (12/28 0313)   No obvious fracture or dislocation as interpreted by me      CT head without contrast   Final Interpretation by Elias Powell MD (12/28 0225)      No intracranial hemorrhage or calvarial fracture.                  Workstation performed: [id number]         CT cervical spine without contrast   Final Interpretation by Elias Powell MD (12/28 0230)      No cervical spine fracture or traumatic malalignment.                  Workstation performed: [id number]             Procedures    ED Medication and Procedure Management   Prior to Admission Medications   Prescriptions Last Dose Informant Patient Reported? Taking?   HYDROcodone-acetaminophen (Norco) 5-325 mg per tablet   No No   Sig: Take 1 tablet by mouth every 6 (six) hours as needed for pain for up to 7 doses Max Daily Amount: 4 tablets   Prenatal Vit-Fe Fumarate-FA (Prenatal Plus Vitamin/Mineral) 27-1 MG TABS  Self No No   Sig: Take 1 tablet by mouth in the morning   cephalexin (KEFLEX) 500 mg capsule   No No   Sig: Take 1 capsule (500 mg total) by mouth every 6 (six) hours for 7 days   fluconazole (DIFLUCAN) 200 mg tablet   No No   Sig: Take 1 tablet (200 mg total) by mouth daily for 1 dose   metroNIDAZOLE (FLAGYL) 500 mg tablet   No No   Sig: Take 1 tablet (500 mg total) by mouth every 8 (eight) hours for 7 days      Facility-Administered Medications: None     Discharge Medication List as of 12/28/2024  2:38 AM        CONTINUE these medications which have NOT CHANGED    Details   cephalexin (KEFLEX) 500 mg capsule Take 1 capsule (500 mg total) by mouth every 6 (six) hours for 7 days, Starting Thu 12/26/2024, Until Thu 1/2/2025, Normal      HYDROcodone-acetaminophen (Norco) 5-325 mg per tablet Take 1 tablet by mouth every 6 (six) hours as needed for pain for up to 7 doses Max Daily Amount: 4 tablets, Starting Thu 12/26/2024, Normal      metroNIDAZOLE (FLAGYL) 500 mg  tablet Take 1 tablet (500 mg total) by mouth every 8 (eight) hours for 7 days, Starting Thu 12/26/2024, Until Thu 1/2/2025, Normal      Prenatal Vit-Fe Fumarate-FA (Prenatal Plus Vitamin/Mineral) 27-1 MG TABS Take 1 tablet by mouth in the morning, Starting Wed 7/31/2024, Normal           STOP taking these medications       fluconazole (DIFLUCAN) 200 mg tablet Comments:   Reason for Stopping:             No discharge procedures on file.  ED SEPSIS DOCUMENTATION   Time reflects when diagnosis was documented in both MDM as applicable and the Disposition within this note       Time User Action Codes Description Comment    12/28/2024  2:36 AM Radha Anaya [R55] Syncope     12/28/2024  2:37 AM Radha Anaya [L05.01] Pilonidal abscess                  Radha Anaya, LAURA  12/28/24 3647

## 2024-12-28 NOTE — DISCHARGE INSTRUCTIONS
Continue taking Flagyl and Keflex- do not drink any alcohol within 48 hours of taking Flagyl    Do not combine alcohol with Norco     Apply heated compresses to tailbone    Return for chest pain, trouble breathing, leg swelling, coughing up blood, passing out, or any other new/concerning symptoms

## 2025-01-02 NOTE — PROGRESS NOTES
Assessment/Plan:   Shelli Casanova is a 24 y.o.female who is here for   Chief Complaint   Patient presents with    Pilonidal Cyst     Patient is here today regarding a recurrent Pilonidal Cyst was seen in the ER on 12/26/24 and placed on antibiotics states it has helped but still feels a small hard bump.States this episode was severe she developed a large hard lump that was visible painful did not open or drain.           She had a recent flare 12/26/24 with a ER visit. Patient refused I and D and was started on Keflex and metronidazole. Patient states this did not drain and now has no pain but does have a very small lump. She continues with hair removal.  This is her 3-4th pilonidal abscess in the last two years.        Plan:  Pilonidal cyst with no current active abscess. Patient would like surgery but would like to discuss with family. She will call the office tomorrow and let us know if she would like to schedule a date. Smoking cessation and weight loss would greatly improve her chances of reasonable wound healing.  She  does vape, and this is the equivalent of smoking for his of wound healing and health care. Pilonidal cyst can be very challenging including the risk of open wound, prolonged wound healing, repeat or recurrent disease.  It is very likely she will have an open wound that will be left to heal with secondary intention and/or with a wound VAC if it is amenable to a wound VAC.  These are complex wounds requiring additional management.  She understands and would like to call back when she's ready to schedule.     Preoperative Clearance: None          _______________________________________________________  CC:Pilonidal Cyst (Patient is here today regarding a recurrent Pilonidal Cyst was seen in the ER on 12/26/24 and placed on antibiotics states it has helped but still feels a small hard bump.States this episode was severe she developed a large hard lump that was visible painful did not open or  drain.  )  .    HPI:  Shelli Casanova is a 24 y.o.female who was referred for evaluation of Pilonidal Cyst (Patient is here today regarding a recurrent Pilonidal Cyst was seen in the ER on 12/26/24 and placed on antibiotics states it has helped but still feels a small hard bump.States this episode was severe she developed a large hard lump that was visible painful did not open or drain.  )  .    11/4/24: Currently patient reports had I and D in ER on 4/21/23 then a skin check on 4/23/23 and packing was removed at that time. Today patient states it came back four days ago and now is a 10/10 with chills at home. She states she can not sit on the area. She has never had surgery on this area.       1/7/25: She had a recent flare 12/26/24 with a ER visit. Patient refused I and D and was started on Keflex and metronidazole. Patient states this did not drain and now has no pain but does have a very small lump. She continues with hair removal.  This is her 3-4th pilonidal abscess in the last two years.    ROS:  General ROS: negative  negative for - chills, fatigue, fever or night sweats, weight loss  Respiratory ROS: no cough, shortness of breath, or wheezing  Cardiovascular ROS: no chest pain or dyspnea on exertion  Genito-Urinary ROS: no dysuria, trouble voiding, or hematuria  Musculoskeletal ROS: negative for - gait disturbance, joint pain or muscle pain  Neurological ROS: no TIA or stroke symptoms  Skin ROS: See HPI  GI ROS: see HPI  Skin ROS: no new rashes or lesions   Lymphatic ROS: no new adenopathy noted by pt.   Psy ROS: no new mental or behavioral disturbances       Patient Active Problem List   Diagnosis    BMI 33.0-33.9,adult    GERD (gastroesophageal reflux disease)    Anxiety    Low TSH level    Elevated prolactin level         Allergies:  Patient has no known allergies.      Current Outpatient Medications:     Prenatal Vit-Fe Fumarate-FA (Prenatal Plus Vitamin/Mineral) 27-1 MG TABS, Take 1 tablet by mouth in  the morning (Patient not taking: Reported on 1/7/2025), Disp: 90 tablet, Rfl: 4    Past Medical History:   Diagnosis Date    Asthma     Chlamydia 2022    treated    Depression     GERD (gastroesophageal reflux disease)     Postoperative wound infection 4/28/2023    big abscess       Past Surgical History:   Procedure Laterality Date    NO PAST SURGERIES         Family History   Problem Relation Age of Onset    Thyroid disease Mother     Cancer Father         Hodgkin's    Hodgkin's lymphoma Father     Breast cancer Neg Hx     Colon cancer Neg Hx     Ovarian cancer Neg Hx         reports that she has never smoked. She has never been exposed to tobacco smoke. She has never used smokeless tobacco. She reports current alcohol use of about 2.0 standard drinks of alcohol per week. She reports current drug use. Frequency: 7.00 times per week. Drug: Marijuana.    Vitals:    01/07/25 1123   BP: 98/62   Pulse: (!) 52   Resp: 16   Temp: (!) 97 °F (36.1 °C)   SpO2: 99%          PHYSICAL EXAM  General Appearance:    Alert, cooperative, no distress   Head:    Normocephalic without obvious abnormality   Eyes:    PERRL, conjunctiva/corneas clear     Neck:   Supple, no adenopathy, no JVD   Back:      Lungs:      Heart:     Abdomen:         Extremities:   Extremities normal. No clubbing, cyanosis or edema   Psych:   Normal Affect, AOx3.    Neurologic:  Skin:   CNII-XII intact. Strength symmetric, speech intact    Warm, dry, intact, no visible rashes or lesions except  No mass no lesion very soft with pinpoint stigmata of a pilonidal.  Small lump top left gluteal cleft.           Dulce Edwards PA-C    Date: 1/7/2025 Time: 11:55 AM

## 2025-01-07 ENCOUNTER — OFFICE VISIT (OUTPATIENT)
Dept: SURGERY | Facility: CLINIC | Age: 25
End: 2025-01-07
Payer: COMMERCIAL

## 2025-01-07 VITALS
HEIGHT: 66 IN | SYSTOLIC BLOOD PRESSURE: 98 MMHG | WEIGHT: 199 LBS | BODY MASS INDEX: 31.98 KG/M2 | DIASTOLIC BLOOD PRESSURE: 62 MMHG | RESPIRATION RATE: 16 BRPM | TEMPERATURE: 97 F | OXYGEN SATURATION: 99 % | HEART RATE: 52 BPM

## 2025-01-07 DIAGNOSIS — L05.01 PILONIDAL ABSCESS OF NATAL CLEFT: Primary | ICD-10-CM

## 2025-01-07 PROCEDURE — 99213 OFFICE O/P EST LOW 20 MIN: CPT | Performed by: PHYSICIAN ASSISTANT

## 2025-03-03 ENCOUNTER — HOSPITAL ENCOUNTER (OUTPATIENT)
Dept: NUCLEAR MEDICINE | Facility: HOSPITAL | Age: 25
Discharge: HOME | End: 2025-03-03
Attending: INTERNAL MEDICINE
Payer: COMMERCIAL

## 2025-03-03 ENCOUNTER — APPOINTMENT (OUTPATIENT)
Dept: LAB | Facility: HOSPITAL | Age: 25
End: 2025-03-03

## 2025-03-03 LAB — B-HCG SERPL-ACNC: <0.6 MIU/ML (ref 0–5)

## 2025-03-03 PROCEDURE — 78014 THYROID IMAGING W/BLOOD FLOW: CPT

## 2025-03-03 PROCEDURE — 84702 CHORIONIC GONADOTROPIN TEST: CPT | Performed by: RADIOLOGY

## 2025-03-03 PROCEDURE — A9516 IODINE I-123 SOD IODIDE MIC: HCPCS

## 2025-03-04 ENCOUNTER — HOSPITAL ENCOUNTER (OUTPATIENT)
Dept: NUCLEAR MEDICINE | Facility: HOSPITAL | Age: 25
Discharge: HOME | End: 2025-03-04
Attending: INTERNAL MEDICINE
Payer: COMMERCIAL

## 2025-03-10 ENCOUNTER — TELEPHONE (OUTPATIENT)
Age: 25
End: 2025-03-10

## 2025-03-10 DIAGNOSIS — E05.90 HYPERTHYROIDISM: Primary | ICD-10-CM

## 2025-03-11 DIAGNOSIS — E05.90 SUBCLINICAL HYPERTHYROIDISM: Primary | ICD-10-CM

## 2025-03-11 RX ORDER — PROPYLTHIOURACIL 50 MG/1
TABLET ORAL
Qty: 30 TABLET | Refills: 3 | Status: SHIPPED | OUTPATIENT
Start: 2025-03-11

## 2025-03-11 NOTE — TELEPHONE ENCOUNTER
KRISTAL [facility]ra[facility] Maternal Fetal Med Pod Clerical  Schedule a virtual visit or face-to-face preconception visit with [facility] physician after April 7, 2025.  Thanks,  Radha

## 2025-04-02 DIAGNOSIS — E05.90 SUBCLINICAL HYPERTHYROIDISM: ICD-10-CM

## 2025-04-02 RX ORDER — PROPYLTHIOURACIL 50 MG/1
50 TABLET ORAL DAILY
Qty: 30 TABLET | Refills: 1 | Status: SHIPPED | OUTPATIENT
Start: 2025-04-02

## 2025-04-06 ENCOUNTER — APPOINTMENT (OUTPATIENT)
Dept: LAB | Facility: HOSPITAL | Age: 25
End: 2025-04-06
Payer: COMMERCIAL

## 2025-04-06 DIAGNOSIS — E05.90 SUBCLINICAL HYPERTHYROIDISM: ICD-10-CM

## 2025-04-06 LAB
ALBUMIN SERPL BCG-MCNC: 3.9 G/DL (ref 3.5–5)
ALP SERPL-CCNC: 74 U/L (ref 34–104)
ALT SERPL W P-5'-P-CCNC: 10 U/L (ref 7–52)
AST SERPL W P-5'-P-CCNC: 10 U/L (ref 13–39)
BILIRUB DIRECT SERPL-MCNC: 0.04 MG/DL (ref 0–0.2)
BILIRUB SERPL-MCNC: 0.35 MG/DL (ref 0.2–1)
FSH SERPL-ACNC: 2.6 MIU/ML
PROT SERPL-MCNC: 6.3 G/DL (ref 6.4–8.4)
T4 FREE SERPL-MCNC: 0.74 NG/DL (ref 0.61–1.12)
TSH SERPL DL<=0.05 MIU/L-ACNC: 0.7 UIU/ML (ref 0.45–4.5)

## 2025-04-06 PROCEDURE — 84439 ASSAY OF FREE THYROXINE: CPT

## 2025-04-06 PROCEDURE — 84443 ASSAY THYROID STIM HORMONE: CPT

## 2025-04-06 PROCEDURE — 80076 HEPATIC FUNCTION PANEL: CPT

## 2025-04-06 PROCEDURE — 36415 COLL VENOUS BLD VENIPUNCTURE: CPT

## 2025-04-07 ENCOUNTER — RESULTS FOLLOW-UP (OUTPATIENT)
Dept: ENDOCRINOLOGY | Facility: CLINIC | Age: 25
End: 2025-04-07

## 2025-04-07 ENCOUNTER — OFFICE VISIT (OUTPATIENT)
Dept: ENDOCRINOLOGY | Facility: CLINIC | Age: 25
End: 2025-04-07
Payer: COMMERCIAL

## 2025-04-07 VITALS
SYSTOLIC BLOOD PRESSURE: 126 MMHG | HEIGHT: 66 IN | DIASTOLIC BLOOD PRESSURE: 84 MMHG | WEIGHT: 186.2 LBS | HEART RATE: 90 BPM | OXYGEN SATURATION: 97 % | BODY MASS INDEX: 29.92 KG/M2

## 2025-04-07 DIAGNOSIS — R79.89 ELEVATED PROLACTIN LEVEL: ICD-10-CM

## 2025-04-07 DIAGNOSIS — E05.90 HYPERTHYROIDISM: ICD-10-CM

## 2025-04-07 DIAGNOSIS — R79.89 LOW TSH LEVEL: Primary | ICD-10-CM

## 2025-04-07 PROCEDURE — 99214 OFFICE O/P EST MOD 30 MIN: CPT | Performed by: PHYSICIAN ASSISTANT

## 2025-04-07 NOTE — PROGRESS NOTES
Name: Shelli Casanova      : 2000      MRN: [id number]  Encounter Provider: Rekha Coreas PA-C  Encounter Date: 2025   Encounter department: [facility] [place]    Chief Complaint   Patient presents with   • Hyperthyroidism     :  Assessment & Plan  Low TSH level  NM scan consistent with Graves' Disease  TSH: 0.697  0.74  Maintained on PTU 50mg daily, continue  Orders:  •  TSH + Free T4; Future    Elevated prolactin level  28.07       Hyperthyroidism  TSH: 0.697  0.74  Maintained on PTU 50mg daily           History of Present Illness {?Quick Links Encounters * My Last Note * Last Note in Specialty * Snapshot * Since Last Visit * History :44819}  History of Present Illness  Shelli Casanova is a 24 y.o. female presents for follow-up of hyperthyroidism.    She reports no adverse effects from the medication and has not experienced any changes in weight, hair growth patterns, or palpitations. She has observed a slight weight gain of approximately 2 pounds but is uncertain if this is related to her thyroid condition. She expresses curiosity about whether her thyroid function could be influencing her prolactin levels and potentially impacting her fertility. She is currently on a daily regimen of PTU, administered in the morning.    Supplemental Information  She continues to experience persistent phlegm and mucus production, which she attributes to her GERD rather than her thyroid condition.    MEDICATIONS  Current: PTU      Pertinent Medical History   Patient referred to endocrinology by OB/GYN due to infertility.  Patient found to have Graves' disease though no antibody test returned positive.  Nuclear med scan 2025 consistent with Graves' disease.        Review of Systems as per HPI       Medical History Reviewed by provider this encounter:     .    Objective {?Quick Links Trend Vitals * Enter New Vitals * Results Review * Timeline (Adult) * Labs * Imaging *  "Cardiology * Procedures * Lung Cancer Screening * Surgical eConsent :84616}  /84   Pulse 90   Ht 5' 6\" (1.676 m)   Wt 84.5 kg (186 lb 3.2 oz)   SpO2 97%   BMI 30.05 kg/m²      Body mass index is 30.05 kg/m².  Wt Readings from Last 3 Encounters:   04/07/25 84.5 kg (186 lb 3.2 oz)   01/07/25 90.3 kg (199 lb)   12/27/24 90.6 kg (199 lb 11.8 oz)     Physical Exam  Vitals and nursing note reviewed.   Constitutional:       General: She is not in acute distress.     Appearance: She is well-developed.   HENT:      Head: Normocephalic and atraumatic.   Eyes:      General: No scleral icterus.     Conjunctiva/sclera: Conjunctivae normal.   Neck:      Thyroid: No thyroid mass, thyromegaly or thyroid tenderness.   Cardiovascular:      Rate and Rhythm: Normal rate and regular rhythm.   Pulmonary:      Effort: Pulmonary effort is normal.      Breath sounds: Normal breath sounds.   Neurological:      Mental Status: She is alert.   Psychiatric:         Attention and Perception: Attention normal.       Physical Exam      Results    Labs:   No results found for: \"HGBA1C\"  Lab Results   Component Value Date    CREATININE 0.96 12/28/2024    CREATININE 0.73 07/25/2023    CREATININE 0.87 06/14/2023    BUN 18 12/28/2024    K 3.9 12/28/2024     12/28/2024    CO2 28 12/28/2024     eGFRcr   Date Value Ref Range Status   06/14/2023 97 >59 Final     eGFR   Date Value Ref Range Status   12/28/2024 84 ml/min/1.73sq m Final     Lab Results   Component Value Date    CHOL 137 08/10/2015    HDL 64 08/10/2015    TRIG 56 08/10/2015     Lab Results   Component Value Date    ALT 10 04/06/2025    AST 10 (L) 04/06/2025    ALKPHOS 74 04/06/2025     Lab Results   Component Value Date    HZL1ZWKZNACA 0.697 04/06/2025    OVB0VZKOHSJM 0.433 (L) 12/04/2024    YQB4JVKYFRWZ 0.169 (L) 10/13/2024     Lab Results   Component Value Date    FREET4 0.74 04/06/2025       There are no Patient Instructions on file for this visit.    Discussed with the " patient and all questioned fully answered. She will call me if any problems arise.

## 2025-04-07 NOTE — ASSESSMENT & PLAN NOTE
NM scan consistent with Graves' Disease  TSH: 0.697  0.74  Maintained on PTU 50mg daily, continue  Orders:  •  TSH + Free T4; Future

## 2025-04-14 ENCOUNTER — RESULTS FOLLOW-UP (OUTPATIENT)
Age: 25
End: 2025-04-14

## 2025-04-22 ENCOUNTER — OFFICE VISIT (OUTPATIENT)
Dept: PERINATAL CARE | Facility: CLINIC | Age: 25
End: 2025-04-22
Payer: COMMERCIAL

## 2025-04-22 VITALS
HEIGHT: 66 IN | DIASTOLIC BLOOD PRESSURE: 60 MMHG | WEIGHT: 185 LBS | SYSTOLIC BLOOD PRESSURE: 104 MMHG | BODY MASS INDEX: 29.73 KG/M2

## 2025-04-22 DIAGNOSIS — Z31.69 ENCOUNTER FOR PRECONCEPTION CONSULTATION: ICD-10-CM

## 2025-04-22 DIAGNOSIS — R79.89 ELEVATED PROLACTIN LEVEL: Primary | ICD-10-CM

## 2025-04-22 DIAGNOSIS — E05.90 HYPERTHYROIDISM: ICD-10-CM

## 2025-04-22 PROCEDURE — 99244 OFF/OP CNSLTJ NEW/EST MOD 40: CPT | Performed by: STUDENT IN AN ORGANIZED HEALTH CARE EDUCATION/TRAINING PROGRAM

## 2025-04-22 NOTE — ASSESSMENT & PLAN NOTE
Shelli was recently diagnosed with hyperthyroidism. She follows with endocrinology and takes PTU 50 mg daily.  Given recent diagnosis, TSH and free T4 are followed closely.    Hyperthyroidism occurs in 0.2 to 0.7% of pregnancies, with Grave's disease accounting for 95% of these cases. Inadequately treated hyperthyroidism is associated with increased spontaneous and medically indicated  deliveries, preeclampsia, heart failure, low birth weight, and stillbirth. Fetal and  risks associated with Graves disease are related either to the disease itself or to thioamide treatment of the disease.  As maternal thyroid stimulating antibodies can cross the placenta, the possibility of fetal thyrotoxicosis should be considered in every pregnancy, which usually manifests as fetal tachycardia and poor fetal growth. 1 to 5% of these neonates have hyperthyroidism or  Graves disease caused by transplacental passage of maternal thyroid stimulating immunoglobulin. TSI antibodies were negative and TRAb were ordered today to complete her antithyroid antibody evaluation. Pediatrics should be notified of  maternal Graves disease at the time of delivery, and the  should be followed for potential development of Graves disease.  She will require serial fetal growth ultrasounds.    Maternal serial free T4 levels should be followed during this pregnancy, with a goal to adjust medical treatment to achieve a free T4 level at the upper end of the normal range. Endocrinology co-management is recommended throughout the pregnancy. Either PTU or methimazole can be used to treat pregnant women with overt hyperthyroidism.  In some cases, however, PTU may result in clinically significant hepatic toxicity; therefore, methimazole is preferred in the 2nd and 3rd trimesters.  PTU is typically preferred in the first trimester due to the risk for cutis aplasia with methimazole.  Transient leukopenia occurs in about 10% of  pregnant women who take thioamide drugs, with a small percentage developing agranulocytosis.

## 2025-04-22 NOTE — ASSESSMENT & PLAN NOTE
We discussed her mildly elevated prolactin.  I defer future monitoring and treatment to endocrinology.  We discussed true hyperprolactinemia may affect ability to conceive though should not affect a future pregnancy.  If medical therapy with bromocriptine or cabergoline is initiated, discontinuation in early pregnancy is recommended.

## 2025-04-22 NOTE — ASSESSMENT & PLAN NOTE
"For any woman who wishes to become pregnant (or not preventing pregnancy), I recommend a daily prenatal vitamin with 400mcg of folate daily to reduce risk of neural tube defects. A healthy diet and exercise (150 minutes or more per week) are recommended. Use of alcohol, tobacco, and illicit substances should be avoided. I recommend that she be up-to-date on all preventive health, including pap smears and colonoscopy (if indicated). Vaccines should be up-to-date as well, including annual influenza vaccination. COVID-19 vaccination is recommended, and mRNA vaccines (Pfizer, Moderna) are favored in women of reproductive age. I recommend her immunity to vaccine-preventable illnesses including Varicella and Rubella be assessed by checking antibody titers (IgG), and that she receive boosters for any waning immunity.     We reviewed the routine ultrasound schedule during pregnancy, including a 12-13 week evaluation of first trimester fetal anatomy with aneuploidy risk assessment (\"nuchal translucency\" ultrasound), as well as fetal anatomic survey and cervical length screening at around 20 weeks gestation, with additional ultrasound follow-up based on individual risk factors.     She enquired about next steps in terms of trying to become pregnant. We discussed the basics of trying to conceive including ovulation tracking and timed intercourse.  Additional assistance in the form of ovulation induction or advanced infertility treatments are deferred to her primary OB/GYN or consulting reproductive endocrinologist.  "

## 2025-04-22 NOTE — PROGRESS NOTES
PRECONCEPTION CONSULTATION: MATERNAL-FETAL MEDICINE     Assessment and Plan: Ms. Casanova is a 24 y.o. year-old  here for preconception counseling.  By issue:    Problem List Items Addressed This Visit       Hyperthyroidism    Shelli was recently diagnosed with hyperthyroidism. She follows with endocrinology and takes PTU 50 mg daily.  Given recent diagnosis, TSH and free T4 are followed closely.    Hyperthyroidism occurs in 0.2 to 0.7% of pregnancies, with Grave's disease accounting for 95% of these cases. Inadequately treated hyperthyroidism is associated with increased spontaneous and medically indicated  deliveries, preeclampsia, heart failure, low birth weight, and stillbirth. Fetal and  risks associated with Graves disease are related either to the disease itself or to thioamide treatment of the disease.  As maternal thyroid stimulating antibodies can cross the placenta, the possibility of fetal thyrotoxicosis should be considered in every pregnancy, which usually manifests as fetal tachycardia and poor fetal growth. 1 to 5% of these neonates have hyperthyroidism or  Graves disease caused by transplacental passage of maternal thyroid stimulating immunoglobulin. TSI antibodies were negative and TRAb were ordered today to complete her antithyroid antibody evaluation. Pediatrics should be notified of  maternal Graves disease at the time of delivery, and the  should be followed for potential development of Graves disease.  She will require serial fetal growth ultrasounds.    Maternal serial free T4 levels should be followed during this pregnancy, with a goal to adjust medical treatment to achieve a free T4 level at the upper end of the normal range. Endocrinology co-management is recommended throughout the pregnancy. Either PTU or methimazole can be used to treat pregnant women with overt hyperthyroidism.  In some cases, however, PTU may result in clinically significant  "hepatic toxicity; therefore, methimazole is preferred in the 2nd and 3rd trimesters.  PTU is typically preferred in the first trimester due to the risk for cutis aplasia with methimazole.  Transient leukopenia occurs in about 10% of pregnant women who take thioamide drugs, with a small percentage developing agranulocytosis.         Relevant Orders    TRAb (TSH Receptor Binding Antibody)    Elevated prolactin level - Primary    We discussed her mildly elevated prolactin.  I defer future monitoring and treatment to endocrinology.  We discussed true hyperprolactinemia may affect ability to conceive though should not affect a future pregnancy.  If medical therapy with bromocriptine or cabergoline is initiated, discontinuation in early pregnancy is recommended.         Encounter for preconception consultation    For any woman who wishes to become pregnant (or not preventing pregnancy), I recommend a daily prenatal vitamin with 400mcg of folate daily to reduce risk of neural tube defects. A healthy diet and exercise (150 minutes or more per week) are recommended. Use of alcohol, tobacco, and illicit substances should be avoided. I recommend that she be up-to-date on all preventive health, including pap smears and colonoscopy (if indicated). Vaccines should be up-to-date as well, including annual influenza vaccination. COVID-19 vaccination is recommended, and mRNA vaccines (Pfizer, Moderna) are favored in women of reproductive age. I recommend her immunity to vaccine-preventable illnesses including Varicella and Rubella be assessed by checking antibody titers (IgG), and that she receive boosters for any waning immunity.     We reviewed the routine ultrasound schedule during pregnancy, including a 12-13 week evaluation of first trimester fetal anatomy with aneuploidy risk assessment (\"nuchal translucency\" ultrasound), as well as fetal anatomic survey and cervical length screening at around 20 weeks gestation, with " additional ultrasound follow-up based on individual risk factors.     She enquired about next steps in terms of trying to become pregnant. We discussed the basics of trying to conceive including ovulation tracking and timed intercourse.  Additional assistance in the form of ovulation induction or advanced infertility treatments are deferred to her primary OB/GYN or consulting reproductive endocrinologist.          Referring physician:   Barbie Waters Md  [address]  [place] [address]    Reason for consultation: hyperthyroidism    Dear Dr. Waters,    Thank you for referring patient Shelli Casanova for preconception Maternal-Fetal Medicine consultation.  Her history is as follows:    Past Medical History:   Diagnosis Date    Asthma     Childhood    Chlamydia     treated    Depression     GERD (gastroesophageal reflux disease)     Hyperthyroidism     Postoperative wound infection 2023    big abscess       Past Surgical History:   Procedure Laterality Date    NO PAST SURGERIES         OB History    Para Term  AB Living   1 0 0 0 1 0   SAB IAB Ectopic Multiple Live Births   1 0 0 0 0      # Outcome Date GA Lbr Sundeep/2nd Weight Sex Type Anes PTL Lv   1 SAB 2023     SAB          Social History     Tobacco Use    Smoking status: Never     Passive exposure: Never    Smokeless tobacco: Never   Vaping Use    Vaping status: Every Day    Substances: Nicotine, Flavoring   Substance Use Topics    Alcohol use: Yes     Alcohol/week: 2.0 standard drinks of alcohol     Types: 2 Shots of liquor per week     Comment: couple times/month    Drug use: Yes     Frequency: 7.0 times per week     Types: Marijuana     Comment: daily marijuana use       Family History   Problem Relation Age of Onset    Thyroid disease Mother     Thyroid disease unspecified Mother     Depression Mother     Mental illness Mother     Cancer Father         Hodgkin's    Hodgkin's lymphoma Father     Breast cancer Neg Hx      Colon cancer Neg Hx     Ovarian cancer Neg Hx        Family history is negative for birth defects, diabetes, thromboembolism, early-onset breast ovarian or colon cancer; Down syndrome or other chromosome problem, genetic condition or carrier state for cystic fibrosis, sickle cell, thalassemia, Doug-Sachs, or spinal muscular atrophy; hemophilia or von Willebrand's disease; preeclampsia or hypertension    Current Outpatient Medications:     propylthiouracil 50 mg tablet, TAKE 1 TABLET BY MOUTH EVERY DAY, Disp: 30 tablet, Rfl: 1    Prenatal Vit-Fe Fumarate-FA (Prenatal Plus Vitamin/Mineral) 27-1 MG TABS, Take 1 tablet by mouth in the morning (Patient not taking: Reported on 1/7/2025), Disp: 90 tablet, Rfl: 4    No Known Allergies    Occupation: does eyelashes.  Spouse/Partner Name: Lorenzo; Age 23; occupation: ; health status: healthy, has hypertension.    Physical Exam  Constitutional:       General: She is not in acute distress.     Appearance: Normal appearance.   HENT:      Head: Normocephalic and atraumatic.   Eyes:      Extraocular Movements: Extraocular movements intact.   Cardiovascular:      Rate and Rhythm: Normal rate.   Pulmonary:      Effort: Pulmonary effort is normal. No respiratory distress.   Neurological:      Mental Status: She is alert and oriented to person, place, and time.   Psychiatric:         Mood and Affect: Mood normal.         Behavior: Behavior normal.         -------------------------    The time spent on this new patient on the encounter date included previsit service time of  5 minutes reviewing records and precharting, face-to-face (via virtual platform) service time of  35 minutes counseling regarding results and coordinating care, and post-service time of  5 minutes charting, totalling 45 minutes.    At the conclusion of today's encounter, all questions were answered to her satisfaction.  Thank you very much for this kind referral and please do not hesitate to contact  me with any further questions or concerns.    Sincerely,    Dulce Colon MD  Attending Physician, Maternal-Fetal Medicine  [facility]

## 2025-05-01 DIAGNOSIS — E05.90 SUBCLINICAL HYPERTHYROIDISM: ICD-10-CM

## 2025-05-01 RX ORDER — PROPYLTHIOURACIL 50 MG/1
50 TABLET ORAL DAILY
Qty: 90 TABLET | Refills: 3 | Status: SHIPPED | OUTPATIENT
Start: 2025-05-01

## 2025-05-05 ENCOUNTER — APPOINTMENT (OUTPATIENT)
Dept: LAB | Facility: HOSPITAL | Age: 25
End: 2025-05-05
Payer: COMMERCIAL

## 2025-05-05 DIAGNOSIS — E05.90 HYPERTHYROIDISM: ICD-10-CM

## 2025-05-05 PROCEDURE — 36415 COLL VENOUS BLD VENIPUNCTURE: CPT

## 2025-05-05 PROCEDURE — 83520 IMMUNOASSAY QUANT NOS NONAB: CPT

## 2025-05-07 ENCOUNTER — RESULTS FOLLOW-UP (OUTPATIENT)
Dept: PERINATAL CARE | Facility: CLINIC | Age: 25
End: 2025-05-07

## 2025-05-07 LAB — TSH RECEP AB SER-ACNC: <1.1 IU/L (ref 0–1.75)

## 2025-05-18 ENCOUNTER — TELEPHONE (OUTPATIENT)
Dept: OTHER | Facility: OTHER | Age: 25
End: 2025-05-18

## 2025-05-18 NOTE — TELEPHONE ENCOUNTER
"Pt stated, \" I got a positive pregnancy test and was told to schedule an appointment.\"    Please follow up, thank you.  "

## 2025-05-19 ENCOUNTER — TELEPHONE (OUTPATIENT)
Age: 25
End: 2025-05-19

## 2025-05-19 DIAGNOSIS — E05.90 HYPERTHYROIDISM: Primary | ICD-10-CM

## 2025-05-19 NOTE — TELEPHONE ENCOUNTER
Outgoing call to patient, advised of same. No additional follow-up needed at this time. Patient to call back with any change in symptoms.

## 2025-05-19 NOTE — TELEPHONE ENCOUNTER
Incoming prenatal call - LMP 4/14 and is scheduled for D&V 6/11. Patient inquiring if she should continue to take prenatal and thyroid medication - Propylthiouracil 50mg tablet once daily. Advised patient to continue taking both.     Patient had thyroid labs performed last on 4/6/25. Patient has been feeling more tired during the day and wide awake at night since Friday 5/16.    Routing to on-call provider if any additional recommendations prior to first appointment 6/11.

## 2025-05-26 ENCOUNTER — APPOINTMENT (EMERGENCY)
Dept: ULTRASOUND IMAGING | Facility: HOSPITAL | Age: 25
End: 2025-05-26
Payer: COMMERCIAL

## 2025-05-26 ENCOUNTER — HOSPITAL ENCOUNTER (EMERGENCY)
Facility: HOSPITAL | Age: 25
Discharge: HOME | End: 2025-05-26
Attending: EMERGENCY MEDICINE | Admitting: EMERGENCY MEDICINE
Payer: COMMERCIAL

## 2025-05-26 VITALS
RESPIRATION RATE: 17 BRPM | DIASTOLIC BLOOD PRESSURE: 63 MMHG | HEART RATE: 65 BPM | OXYGEN SATURATION: 98 % | BODY MASS INDEX: 29 KG/M2 | SYSTOLIC BLOOD PRESSURE: 122 MMHG | WEIGHT: 179.68 LBS | TEMPERATURE: 98.1 F

## 2025-05-26 DIAGNOSIS — O20.9 VAGINAL BLEEDING IN PREGNANCY, FIRST TRIMESTER: Primary | ICD-10-CM

## 2025-05-26 DIAGNOSIS — R11.0 NAUSEA: ICD-10-CM

## 2025-05-26 LAB
ABO GROUP BLD: NORMAL
B-HCG SERPL-ACNC: ABNORMAL MIU/ML (ref 0–5)
BACTERIA UR QL AUTO: ABNORMAL /HPF
BASOPHILS # BLD AUTO: 0.04 THOUSANDS/ÂΜL (ref 0–0.1)
BASOPHILS NFR BLD AUTO: 0 % (ref 0–1)
BILIRUB UR QL STRIP: NEGATIVE
BLD GP AB SCN SERPL QL: NEGATIVE
CAOX CRY URNS QL MICRO: ABNORMAL /HPF
CLARITY UR: CLEAR
COLOR UR: YELLOW
EOSINOPHIL # BLD AUTO: 0.17 THOUSAND/ÂΜL (ref 0–0.61)
EOSINOPHIL NFR BLD AUTO: 2 % (ref 0–6)
ERYTHROCYTE [DISTWIDTH] IN BLOOD BY AUTOMATED COUNT: 13.5 % (ref 11.6–15.1)
EXT PREGNANCY TEST URINE: POSITIVE
EXT. CONTROL: ABNORMAL
GLUCOSE UR STRIP-MCNC: NEGATIVE MG/DL
HCT VFR BLD AUTO: 36.6 % (ref 34.8–46.1)
HGB BLD-MCNC: 12.1 G/DL (ref 11.5–15.4)
HGB UR QL STRIP.AUTO: ABNORMAL
IMM GRANULOCYTES # BLD AUTO: 0.03 THOUSAND/UL (ref 0–0.2)
IMM GRANULOCYTES NFR BLD AUTO: 0 % (ref 0–2)
KETONES UR STRIP-MCNC: NEGATIVE MG/DL
LEUKOCYTE ESTERASE UR QL STRIP: NEGATIVE
LYMPHOCYTES # BLD AUTO: 3.51 THOUSANDS/ÂΜL (ref 0.6–4.47)
LYMPHOCYTES NFR BLD AUTO: 31 % (ref 14–44)
MCH RBC QN AUTO: 29.3 PG (ref 26.8–34.3)
MCHC RBC AUTO-ENTMCNC: 33.1 G/DL (ref 31.4–37.4)
MCV RBC AUTO: 89 FL (ref 82–98)
MONOCYTES # BLD AUTO: 0.75 THOUSAND/ÂΜL (ref 0.17–1.22)
MONOCYTES NFR BLD AUTO: 7 % (ref 4–12)
MUCOUS THREADS UR QL AUTO: ABNORMAL
NEUTROPHILS # BLD AUTO: 6.94 THOUSANDS/ÂΜL (ref 1.85–7.62)
NEUTS SEG NFR BLD AUTO: 60 % (ref 43–75)
NITRITE UR QL STRIP: NEGATIVE
NON-SQ EPI CELLS URNS QL MICRO: ABNORMAL /HPF
NRBC BLD AUTO-RTO: 0 /100 WBCS
PH UR STRIP.AUTO: 7 [PH] (ref 4.5–8)
PLATELET # BLD AUTO: 297 THOUSANDS/UL (ref 149–390)
PMV BLD AUTO: 9.5 FL (ref 8.9–12.7)
PROT UR STRIP-MCNC: ABNORMAL MG/DL
RBC # BLD AUTO: 4.13 MILLION/UL (ref 3.81–5.12)
RBC #/AREA URNS AUTO: ABNORMAL /HPF
RH BLD: POSITIVE
SP GR UR STRIP.AUTO: >=1.03 (ref 1–1.03)
SPECIMEN EXPIRATION DATE: NORMAL
UROBILINOGEN UR QL STRIP.AUTO: 0.2 E.U./DL
WBC # BLD AUTO: 11.44 THOUSAND/UL (ref 4.31–10.16)
WBC #/AREA URNS AUTO: ABNORMAL /HPF

## 2025-05-26 PROCEDURE — 85025 COMPLETE CBC W/AUTO DIFF WBC: CPT | Performed by: EMERGENCY MEDICINE

## 2025-05-26 PROCEDURE — 81001 URINALYSIS AUTO W/SCOPE: CPT

## 2025-05-26 PROCEDURE — 87106 FUNGI IDENTIFICATION YEAST: CPT

## 2025-05-26 PROCEDURE — 87077 CULTURE AEROBIC IDENTIFY: CPT

## 2025-05-26 PROCEDURE — 36415 COLL VENOUS BLD VENIPUNCTURE: CPT | Performed by: EMERGENCY MEDICINE

## 2025-05-26 PROCEDURE — 76801 OB US < 14 WKS SINGLE FETUS: CPT

## 2025-05-26 PROCEDURE — 84702 CHORIONIC GONADOTROPIN TEST: CPT | Performed by: EMERGENCY MEDICINE

## 2025-05-26 PROCEDURE — 87086 URINE CULTURE/COLONY COUNT: CPT

## 2025-05-26 PROCEDURE — 86901 BLOOD TYPING SEROLOGIC RH(D): CPT | Performed by: EMERGENCY MEDICINE

## 2025-05-26 PROCEDURE — 86900 BLOOD TYPING SEROLOGIC ABO: CPT | Performed by: EMERGENCY MEDICINE

## 2025-05-26 PROCEDURE — 86850 RBC ANTIBODY SCREEN: CPT | Performed by: EMERGENCY MEDICINE

## 2025-05-26 PROCEDURE — 99284 EMERGENCY DEPT VISIT MOD MDM: CPT

## 2025-05-26 PROCEDURE — 99284 EMERGENCY DEPT VISIT MOD MDM: CPT | Performed by: EMERGENCY MEDICINE

## 2025-05-26 PROCEDURE — 81025 URINE PREGNANCY TEST: CPT | Performed by: EMERGENCY MEDICINE

## 2025-05-26 RX ORDER — METOCLOPRAMIDE 10 MG/1
10 TABLET ORAL EVERY 6 HOURS
Qty: 30 TABLET | Refills: 0 | Status: SHIPPED | OUTPATIENT
Start: 2025-05-26

## 2025-05-26 NOTE — ED PROVIDER NOTES
Time reflects when diagnosis was documented in both MDM as applicable and the Disposition within this note       Time User Action Codes Description Comment    5/26/2025  4:52 PM Shaheed Rodriguez Add [O20.9] Vaginal bleeding in pregnancy, first trimester     5/26/2025  4:52 PM Shaheed Rodriguez Add [R11.0] Nausea           ED Disposition       ED Disposition   Discharge    Condition   Stable    Date/Time   Mon May 26, 2025  4:52 PM    Comment   Shelli Casanova discharge to home/self care.                   Assessment & Plan       Medical Decision Making  1. Vaginal bleeding - Generally mild bleeding in early pregnancy. Will check Hcg quant, type and screen, urine for infection, U/S to determine presence of an IUP.    Problems Addressed:  Nausea: acute illness or injury  Vaginal bleeding in pregnancy, first trimester: undiagnosed new problem with uncertain prognosis    Amount and/or Complexity of Data Reviewed  Labs: ordered.  Radiology: ordered.    Risk  Prescription drug management.             Medications - No data to display    ED Risk Strat Scores                    No data recorded        SBIRT 20yo+      Flowsheet Row Most Recent Value   Initial Alcohol Screen: US AUDIT-C     1. How often do you have a drink containing alcohol? 0 Filed at: 05/26/2025 1443   2. How many drinks containing alcohol do you have on a typical day you are drinking?  0 Filed at: 05/26/2025 1443   3b. FEMALE Any Age, or MALE 65+: How often do you have 4 or more drinks on one occassion? 0 Filed at: 05/26/2025 1443   Audit-C Score 0 Filed at: 05/26/2025 1443   CELESTINE: How many times in the past year have you...    Used an illegal drug or used a prescription medication for non-medical reasons? Never Filed at: 05/26/2025 1443                            History of Present Illness       Chief Complaint   Patient presents with    Vaginal Bleeding - Pregnant     Pt reports she is approx 6 weeks pregnant, reports occasionally would notice spotting when  "she wiped after using the toilet, reports yesterday it became a little bit more noticeable. Reports intermittent cramping. States, \"I had a miscarriage before so I just want to be sure.\"        Past Medical History[1]   Past Surgical History[2]   Family History[3]   Social History[4]   E-Cigarette/Vaping    E-Cigarette Use Current Every Day User       E-Cigarette/Vaping Substances    Nicotine Yes     THC No     CBD No     Flavoring Yes       I have reviewed and agree with the history as documented.     23 YO female, ~6 weeks pregnant, presents with vaginal bleeding. Patient states this has been present for the last few days, had been primarily spotting but it did worsen some today. Patient states concern as she has a history of previous miscarriage. She has some mild cramping this morning, denies nausea. She has been in touch with OB but has not had an initial appointment. Pt denies CP/SOB/F/C/N/V/D/C, no dysuria, burning on urination or blood in urine.       History provided by:  Patient   used: No        Review of Systems   Constitutional:  Negative for chills, fatigue and fever.   HENT:  Negative for dental problem.    Eyes:  Negative for visual disturbance.   Respiratory:  Negative for shortness of breath.    Cardiovascular:  Negative for chest pain.   Gastrointestinal:  Negative for abdominal pain, diarrhea and vomiting.   Genitourinary:  Positive for vaginal bleeding. Negative for dysuria and frequency.   Musculoskeletal:  Negative for arthralgias.   Skin:  Negative for rash.   Neurological:  Negative for dizziness, weakness and light-headedness.   Psychiatric/Behavioral:  Negative for agitation, behavioral problems and confusion.    All other systems reviewed and are negative.          Objective       ED Triage Vitals [05/26/25 1406]   Temperature Pulse Blood Pressure Respirations SpO2 Patient Position - Orthostatic VS   98.1 °F (36.7 °C) 62 124/61 18 99 % Lying      Temp Source Heart Rate " Source BP Location FiO2 (%) Pain Score    Oral Monitor Right arm -- No Pain      Vitals      Date and Time Temp Pulse SpO2 Resp BP Pain Score FACES Pain Rating User   05/26/25 1539 -- 65 98 % 17 122/63 -- -- RK   05/26/25 1406 98.1 °F (36.7 °C) 62 99 % 18 124/61 No Pain -- JS            Physical Exam  Vitals and nursing note reviewed.   Constitutional:       Appearance: Normal appearance.   HENT:      Head: Normocephalic and atraumatic.     Eyes:      Extraocular Movements: Extraocular movements intact.      Conjunctiva/sclera: Conjunctivae normal.       Cardiovascular:      Rate and Rhythm: Normal rate.   Pulmonary:      Effort: Pulmonary effort is normal.   Abdominal:      General: There is no distension.     Musculoskeletal:         General: Normal range of motion.      Cervical back: Normal range of motion.     Skin:     Findings: No rash.     Neurological:      General: No focal deficit present.      Mental Status: She is alert.      Cranial Nerves: No cranial nerve deficit.     Psychiatric:         Mood and Affect: Mood normal.         Results Reviewed       Procedure Component Value Units Date/Time    Quantitative hCG [[id number]]  (Abnormal) Collected: 05/26/25 1428    Lab Status: Final result Specimen: Blood from Arm, Left Updated: 05/26/25 1732     HCG, Quant 18,266.1 mIU/mL     Narrative:       Expected Ranges:    HCG results between 5.0 and 25.0 mIU/mL may be indicative of early pregnancy but should be interpreted in light of the total clinical presentation.    HCG can rise to detectable levels in zoë and post menopausal women (0-11.6 mIU/mL).     Approximate               Approximate HCG  Gestation age          Concentration ( mIU/mL)  _____________          ______________________   Weeks                      HCG values  0.2-1                       5-50  1-2                           2-3                         100-5000  3-4                         500-00926  4-5                          1000-19936  5-6                         [id number]  6-8                         43487-697883  8-12                        [id number]      Urine Microscopic [[id number]]  (Abnormal) Collected: 05/26/25 1430    Lab Status: Final result Specimen: Urine, Clean Catch Updated: 05/26/25 1549     RBC, UA 2-4 /hpf      WBC, UA 4-10 /hpf      Epithelial Cells Occasional /hpf      Bacteria, UA Occasional /hpf      MUCUS THREADS Occasional     Ca Oxalate Lori, UA Occasional /hpf     Urine culture [[id number]] Collected: 05/26/25 1430    Lab Status: In process Specimen: Urine, Clean Catch Updated: 05/26/25 1436    CBC and differential [[id number]]  (Abnormal) Collected: 05/26/25 1428    Lab Status: Final result Specimen: Blood from Arm, Left Updated: 05/26/25 1435     WBC 11.44 Thousand/uL      RBC 4.13 Million/uL      Hemoglobin 12.1 g/dL      Hematocrit 36.6 %      MCV 89 fL      MCH 29.3 pg      MCHC 33.1 g/dL      RDW 13.5 %      MPV 9.5 fL      Platelets 297 Thousands/uL      nRBC 0 /100 WBCs      Segmented % 60 %      Immature Grans % 0 %      Lymphocytes % 31 %      Monocytes % 7 %      Eosinophils Relative 2 %      Basophils Relative 0 %      Absolute Neutrophils 6.94 Thousands/µL      Absolute Immature Grans 0.03 Thousand/uL      Absolute Lymphocytes 3.51 Thousands/µL      Absolute Monocytes 0.75 Thousand/µL      Eosinophils Absolute 0.17 Thousand/µL      Basophils Absolute 0.04 Thousands/µL     POCT pregnancy, urine [[id number]]  (Abnormal) Collected: 05/26/25 1432    Lab Status: Final result Updated: 05/26/25 1432     EXT Preg Test, Ur Positive     Control Valid    Urine Macroscopic, POC [[id number]]  (Abnormal) Collected: 05/26/25 1430    Lab Status: Final result Specimen: Urine Updated: 05/26/25 1432     Color, UA Yellow     Clarity, UA Clear     pH, UA 7.0     Leukocytes, UA Negative     Nitrite, UA Negative     Protein, UA 30 (1+) mg/dl      Glucose, UA Negative mg/dl      Ketones, UA Negative mg/dl       Urobilinogen, UA 0.2 E.U./dl      Bilirubin, UA Negative     Occult Blood, UA Moderate     Specific Gravity, UA >=1.030    Narrative:      CLINITEK RESULT            US OB < 14 weeks with transvaginal   Final Interpretation by Janelle Ramirez MD (05/26 1644)   Intrauterine gestational sac containing a yolk sac and tiny fetal pole. No fetal heart rate detected. Recommend continued follow-up with serial beta hCG levels.            The study was marked in EPIC for immediate notification.               Workstation performed: [id number]             Procedures    ED Medication and Procedure Management   Prior to Admission Medications   Prescriptions Last Dose Informant Patient Reported? Taking?   Prenatal Vit-Fe Fumarate-FA (Prenatal Plus Vitamin/Mineral) 27-1 MG TABS  Self No No   Sig: Take 1 tablet by mouth in the morning   Patient not taking: Reported on 1/7/2025   propylthiouracil 50 mg tablet   No No   Sig: TAKE 1 TABLET BY MOUTH EVERY DAY      Facility-Administered Medications: None     Discharge Medication List as of 5/26/2025  4:53 PM        START taking these medications    Details   metoclopramide (REGLAN) 10 mg tablet Take 1 tablet (10 mg total) by mouth every 6 (six) hours, Starting Mon 5/26/2025, Normal           CONTINUE these medications which have NOT CHANGED    Details   Prenatal Vit-Fe Fumarate-FA (Prenatal Plus Vitamin/Mineral) 27-1 MG TABS Take 1 tablet by mouth in the morning, Starting Wed 7/31/2024, Normal      propylthiouracil 50 mg tablet TAKE 1 TABLET BY MOUTH EVERY DAY, Starting Thu 5/1/2025, Normal           Outpatient Discharge Orders   hCG, quantitative   Standing Status: Future Standing Exp. Date: 07/26/26     ED SEPSIS DOCUMENTATION   Time reflects when diagnosis was documented in both MDM as applicable and the Disposition within this note       Time User Action Codes Description Comment    5/26/2025  4:52 PM Shaheed Rodriguez Add [O20.9] Vaginal bleeding in pregnancy, first trimester      5/26/2025  4:52 PM Shaheed Rodriguez Add [R11.0] Nausea                      [1]   Past Medical History:  Diagnosis Date    Asthma     Childhood    Chlamydia 2022    treated    Depression     GERD (gastroesophageal reflux disease)     Hyperthyroidism     Postoperative wound infection 4/28/2023    big abscess   [2]   Past Surgical History:  Procedure Laterality Date    NO PAST SURGERIES     [3]   Family History  Problem Relation Name Age of Onset    Thyroid disease Mother Nury Brewer     Thyroid disease unspecified Mother Nury Brewer     Depression Mother Nury Brewer     Mental illness Mother Nury Brewer     Cancer Father Vidal Casanova         Hodgkin's    Hodgkin's lymphoma Father Vidal Casanova     Breast cancer Neg Hx      Colon cancer Neg Hx      Ovarian cancer Neg Hx     [4]   Social History  Tobacco Use    Smoking status: Never     Passive exposure: Never    Smokeless tobacco: Never   Vaping Use    Vaping status: Every Day    Substances: Nicotine, Flavoring   Substance Use Topics    Alcohol use: Yes     Alcohol/week: 2.0 standard drinks of alcohol     Types: 2 Shots of liquor per week     Comment: couple times/month    Drug use: Yes     Frequency: 7.0 times per week     Types: Marijuana     Comment: daily marijuana use        Shaheed Rodriguez MD  05/26/25 2015

## 2025-05-26 NOTE — DISCHARGE INSTRUCTIONS
A lab slip for repeat hormone testing has been provided, you should have this test done at an outpatient lab in 2 days to determine if the level is increasing appropriately.    Call your OB, let them know you were in the ER and have labs that are pending, you should be followed up in the office to make sure the pregnancy is progressing appropriately.    You can take the Reglan for nausea.

## 2025-05-28 ENCOUNTER — APPOINTMENT (OUTPATIENT)
Dept: LAB | Facility: HOSPITAL | Age: 25
End: 2025-05-28
Payer: COMMERCIAL

## 2025-05-28 ENCOUNTER — RESULTS FOLLOW-UP (OUTPATIENT)
Dept: EMERGENCY DEPT | Facility: HOSPITAL | Age: 25
End: 2025-05-28

## 2025-05-28 DIAGNOSIS — O20.9 VAGINAL BLEEDING IN PREGNANCY, FIRST TRIMESTER: ICD-10-CM

## 2025-05-28 LAB
B-HCG SERPL-ACNC: ABNORMAL MIU/ML (ref 0–5)
BACTERIA UR CULT: ABNORMAL
BACTERIA UR CULT: ABNORMAL

## 2025-05-28 PROCEDURE — 84702 CHORIONIC GONADOTROPIN TEST: CPT

## 2025-05-28 PROCEDURE — 36415 COLL VENOUS BLD VENIPUNCTURE: CPT

## 2025-05-31 ENCOUNTER — HOSPITAL ENCOUNTER (EMERGENCY)
Facility: HOSPITAL | Age: 25
Discharge: HOME | End: 2025-05-31
Attending: EMERGENCY MEDICINE | Admitting: EMERGENCY MEDICINE
Payer: COMMERCIAL

## 2025-05-31 VITALS
SYSTOLIC BLOOD PRESSURE: 119 MMHG | DIASTOLIC BLOOD PRESSURE: 60 MMHG | TEMPERATURE: 98.3 F | OXYGEN SATURATION: 99 % | HEART RATE: 68 BPM | RESPIRATION RATE: 18 BRPM

## 2025-05-31 DIAGNOSIS — O20.0 THREATENED MISCARRIAGE IN EARLY PREGNANCY: Primary | ICD-10-CM

## 2025-05-31 LAB
B-HCG SERPL-ACNC: ABNORMAL MIU/ML (ref 0–5)
BACTERIA UR QL AUTO: NORMAL /HPF
BILIRUB UR QL STRIP: NEGATIVE
CLARITY UR: NORMAL
COLOR UR: YELLOW
GLUCOSE UR STRIP-MCNC: NEGATIVE MG/DL
HGB UR QL STRIP.AUTO: NEGATIVE
KETONES UR STRIP-MCNC: NEGATIVE MG/DL
LEUKOCYTE ESTERASE UR QL STRIP: NEGATIVE
NITRITE UR QL STRIP: NEGATIVE
NON-SQ EPI CELLS URNS QL MICRO: NORMAL /HPF
PH UR STRIP.AUTO: 7.5 [PH]
PROT UR STRIP-MCNC: NEGATIVE MG/DL
RBC #/AREA URNS AUTO: NORMAL /HPF
SP GR UR STRIP.AUTO: 1.02 (ref 1–1.03)
UROBILINOGEN UR STRIP-ACNC: <2 MG/DL
WBC #/AREA URNS AUTO: NORMAL /HPF

## 2025-05-31 PROCEDURE — 84702 CHORIONIC GONADOTROPIN TEST: CPT | Performed by: STUDENT IN AN ORGANIZED HEALTH CARE EDUCATION/TRAINING PROGRAM

## 2025-05-31 PROCEDURE — 81001 URINALYSIS AUTO W/SCOPE: CPT | Performed by: EMERGENCY MEDICINE

## 2025-05-31 PROCEDURE — 99283 EMERGENCY DEPT VISIT LOW MDM: CPT

## 2025-05-31 PROCEDURE — 99285 EMERGENCY DEPT VISIT HI MDM: CPT | Performed by: EMERGENCY MEDICINE

## 2025-05-31 PROCEDURE — 87086 URINE CULTURE/COLONY COUNT: CPT | Performed by: EMERGENCY MEDICINE

## 2025-05-31 PROCEDURE — 36415 COLL VENOUS BLD VENIPUNCTURE: CPT | Performed by: STUDENT IN AN ORGANIZED HEALTH CARE EDUCATION/TRAINING PROGRAM

## 2025-05-31 NOTE — ED ATTENDING ATTESTATION
2025  I, Mike Sanchez DO, saw and evaluated the patient. I have discussed the patient with the resident/non-physician practitioner and agree with the resident's/non-physician practitioner's findings, Plan of Care, and MDM as documented in the resident's/non-physician practitioner's note, except where noted. All available labs and Radiology studies were reviewed.  I was present for key portions of any procedure(s) performed by the resident/non-physician practitioner and I was immediately available to provide assistance.       At this point I agree with the current assessment done in the Emergency Department.  I have conducted an independent evaluation of this patient a history and physical is as follows:    Patient is a 24-year-old female, 6-week and 5 days per LMP of 2025, -0-1-0 accompanied by her boyfriend.  She was not taking fertility treatments or IVF, became pregnant, has a new OB appointment on .  She has been having several days of some vaginal spotting and bleeding.  No lightheadedness, no trauma, no recent sexual intercourse or anything inserted inside the vagina except for a transvaginal ultrasound probe.  She was seen in an outside emergency department on May 26, blood type was O+, hCG 65047, transvaginal ultrasound showed intrauterine pregnancy but no heartbeat.  She had outpatient hCG 2 days later, [id number].  She presents today because she still having some occasional spotting and occasional bleeding.  No fever, no chills, no dysuria, no hematuria, no abdominal pain.  Her boyfriend says she is otherwise been acting normally.    General:  Patient is well-appearing  Head:  Atraumatic  Eyes:  Conjunctiva pink  ENT:  Mucous membranes are moist  Neck:  Supple  Cardiac:  S1-S2, without murmurs  Lungs:  Clear to auscultation bilaterally  Abdomen:  Soft, nontender, normal bowel sounds, no CVA tenderness, no tympany, no rigidity, no guarding  Extremities:  Normal range of  motion  Neurologic:  Awake, fluent speech, normal comprehension. AAOx3.   Skin:  Pink warm and dry  Psychiatric:  Alert, pleasant, cooperative          ED Course     Labs Reviewed   HCG, QUANTITATIVE - Abnormal       Result Value Ref Range Status    HCG, Quant 37,537.0 (*) 0 - 5 mIU/mL Final    Narrative:      Expected Ranges:    HCG results between 5.0 and 25.0 mIU/mL may be indicative of early pregnancy but should be interpreted in light of the total clinical presentation.    HCG can rise to detectable levels in zoë and post menopausal women (0-11.6 mIU/mL).     Approximate               Approximate HCG  Gestation age          Concentration ( mIU/mL)  _____________          ______________________   Weeks                      HCG values  0.2-1                       5-50  1-2                           2-3                         100-5000  3-4                         500-97974  4-5                         1000-85879  5-6                         08361-906284  6-8                         62794-082019  8-12                        [id number]     URINE CULTURE   URINALYSIS WITH MICROSCOPIC    Color, UA Yellow   Final    Clarity, UA Turbid   Final    Specific Gravity, UA 1.023  1.003 - 1.030 Final    pH, UA 7.5  4.5, 5.0, 5.5, 6.0, 6.5, 7.0, 7.5, 8.0 Final    Leukocytes, UA Negative  Negative Final    Nitrite, UA Negative  Negative Final    Protein, UA Negative  Negative mg/dl Final    Glucose, UA Negative  Negative mg/dl Final    Ketones, UA Negative  Negative mg/dl Final    Urobilinogen, UA <2.0  <2.0 mg/dl mg/dl Final    Bilirubin, UA Negative  Negative Final    Occult Blood, UA Negative  Negative Final    RBC, UA None Seen  None Seen, 1-2 /hpf Final    WBC, UA None Seen  None Seen, 1-2 /hpf Final    Epithelial Cells None Seen  None Seen, Occasional /hpf Final    Bacteria, UA None Seen  None Seen, Occasional /hpf Final       Patient reassessed several times, no change to the above findings.  The patient has a  threatened .  My concern for heterotopic pregnancy is extremely low given her lack of IVF.  She is already had a transvaginal ultrasound which does show yolk sac, and having a beta-hCG which is increasing, I do not believe that repeating the ultrasound is indicated at this point.  Her symptoms do not suggest anemia and I do not believe repeating her CBC is necessary at this point.Supportive care, importance of follow-up and return precautions were discussed with the patient, who expressed understanding.        IMPRESSIONS:  Acute threatened     MEDICAL DECISION MAKING CODING    COLLECTION AND INTERPRETATION OF DATA  I reviewed prior external notes, including outpatient blood work May 28, 2025 as noted above    I ordered each unique test  Tests reviewed personally by me:  Labs: See above      Critical Care Time  Procedures

## 2025-05-31 NOTE — ED PROVIDER NOTES
Time reflects when diagnosis was documented in both MDM as applicable and the Disposition within this note       Time User Action Codes Description Comment    5/31/2025  5:08 PM Braxton Hooks Add [O20.0] Threatened miscarriage in early pregnancy           ED Disposition       ED Disposition   Discharge    Condition   Stable    Date/Time   Sat May 31, 2025  5:09 PM    Comment   Shelli Casanova discharge to home/self care.                   Assessment & Plan       Medical Decision Making  24-year-old female presents to the emergency department today for evaluation of vaginal bleeding.  Between 6 and 7 weeks pregnant.  She is hemodynamically stable with reassuring vitals in no apparent distress.  Examination reassuring other than vaginal bleeding.  Patient had appropriate workup performed at the [place] emergency department late last week.  Plan to obtain hCG quantitative here in the emergency department today to trend the level.  Patient is average antigen positive, and no indication for RhoGAM administration in the first trimester.  hCG quant reveals appropriately increasing level.  Reassurance provided, patient instructed to follow-up with her obstetrician as scheduled but I advised her to contact her physician and let them know what is happening.  May require continued trending of hCG.  We did discussed worrisome symptoms which would require to come back to department which she verbalized understanding.  She remained hemodynamically stable while under my care and is appropriate for discharge home with outpatient follow-up instructions.    Amount and/or Complexity of Data Reviewed  Labs: ordered.             Medications - No data to display    ED Risk Strat Scores                    No data recorded        SBIRT 20yo+      Flowsheet Row Most Recent Value   Initial Alcohol Screen: US AUDIT-C     1. How often do you have a drink containing alcohol? 0 Filed at: 05/31/2025 1501   2. How many drinks containing alcohol  do you have on a typical day you are drinking?  0 Filed at: 05/31/2025 1501   3b. FEMALE Any Age, or MALE 65+: How often do you have 4 or more drinks on one occassion? 0 Filed at: 05/31/2025 1501   Audit-C Score 0 Filed at: 05/31/2025 1501   CELESTINE: How many times in the past year have you...    Used an illegal drug or used a prescription medication for non-medical reasons? Never Filed at: 05/31/2025 1501                            History of Present Illness       Chief Complaint   Patient presents with    Vaginal Bleeding     Vaginal bleeding 6 weeks pregnant. Pt reports started yesterday with heavy bleeding. Second pregnancy, prior miscarriage.        Past Medical History[1]   Past Surgical History[2]   Family History[3]   Social History[4]   E-Cigarette/Vaping    E-Cigarette Use Current Every Day User       E-Cigarette/Vaping Substances    Nicotine Yes     THC No     CBD No     Flavoring Yes       I have reviewed and agree with the history as documented.     G2, P0 24-year-old female presents to the emergency department today for evaluation of vaginal bleeding.  She began to bleed last week and was seen in the emergency department [place].  She tells me that she had ultrasound done and was told that there is an intrauterine pregnancy.  She had a repeat hCG level drawn and states that it went off.  She came to the department today because she noticed increased bleeding that began this morning and has been more consistent than usual.  She notes intermittent passage of very small clots.  Denies abdominal cramping denies urinary symptoms.  No fevers or chills.  Had a miscarriage in the past.  First OB appointment is in 1 week.        Review of Systems   Constitutional:  Negative for activity change, appetite change, chills, fatigue and fever.   Eyes:  Negative for visual disturbance.   Respiratory:  Negative for chest tightness and shortness of breath.    Cardiovascular:  Negative for chest pain.   Gastrointestinal:   Negative for abdominal distention, abdominal pain, anal bleeding, diarrhea, nausea and vomiting.   Genitourinary:  Positive for vaginal bleeding. Negative for dysuria, flank pain, hematuria, pelvic pain, urgency and vaginal pain.   Skin:  Negative for rash.   Neurological:  Negative for dizziness, light-headedness and headaches.           Objective       ED Triage Vitals [05/31/25 1459]   Temperature Pulse Blood Pressure Respirations SpO2 Patient Position - Orthostatic VS   98.3 °F (36.8 °C) 70 134/68 18 99 % Sitting      Temp Source Heart Rate Source BP Location FiO2 (%) Pain Score    Temporal Monitor Left arm -- No Pain      Vitals      Date and Time Temp Pulse SpO2 Resp BP Pain Score FACES Pain Rating User   05/31/25 1658 -- 68 99 % 18 119/60 -- -- SD   05/31/25 1500 -- -- 99 % -- -- -- -- BL   05/31/25 1459 98.3 °F (36.8 °C) 70 99 % 18 134/68 No Pain -- BL            Physical Exam  Vitals reviewed.   Constitutional:       General: She is not in acute distress.     Appearance: Normal appearance.   HENT:      Head: Normocephalic and atraumatic.     Cardiovascular:      Rate and Rhythm: Normal rate and regular rhythm.      Pulses: Normal pulses.      Heart sounds: Normal heart sounds. No murmur heard.  Pulmonary:      Effort: Pulmonary effort is normal. No respiratory distress.      Breath sounds: Normal breath sounds.   Abdominal:      General: Abdomen is flat. There is no distension.      Palpations: Abdomen is soft. There is no mass.      Tenderness: There is no abdominal tenderness. There is no guarding.      Hernia: No hernia is present.     Skin:     General: Skin is warm and dry.      Coloration: Skin is not jaundiced.      Findings: No bruising or rash.     Neurological:      Mental Status: She is alert and oriented to person, place, and time.         Results Reviewed       Procedure Component Value Units Date/Time    hCG, quantitative [[id number]]  (Abnormal) Collected: 05/31/25 1536    Lab Status: Final  result Specimen: Blood from Arm, Left Updated: 05/31/25 1647     HCG, Quant 37,537.0 mIU/mL     Narrative:       Expected Ranges:    HCG results between 5.0 and 25.0 mIU/mL may be indicative of early pregnancy but should be interpreted in light of the total clinical presentation.    HCG can rise to detectable levels in zoë and post menopausal women (0-11.6 mIU/mL).     Approximate               Approximate HCG  Gestation age          Concentration ( mIU/mL)  _____________          ______________________   Weeks                      HCG values  0.2-1                       5-50  1-2                           2-3                         100-5000  3-4                         500-23998  4-5                         1000-99244  5-6                         [id number]  6-8                         [id number]  8-12                        [id number]      Urinalysis with microscopic [[id number]] Collected: 05/31/25 1536    Lab Status: Final result Specimen: Urine, Clean Catch Updated: 05/31/25 1612     Color, UA Yellow     Clarity, UA Turbid     Specific Gravity, UA 1.023     pH, UA 7.5     Leukocytes, UA Negative     Nitrite, UA Negative     Protein, UA Negative mg/dl      Glucose, UA Negative mg/dl      Ketones, UA Negative mg/dl      Urobilinogen, UA <2.0 mg/dl      Bilirubin, UA Negative     Occult Blood, UA Negative     RBC, UA None Seen /hpf      WBC, UA None Seen /hpf      Epithelial Cells None Seen /hpf      Bacteria, UA None Seen /hpf     Urine culture [[id number]] Collected: 05/31/25 1535    Lab Status: In process Specimen: Urine, Clean Catch Updated: 05/31/25 1544            No orders to display       Procedures    ED Medication and Procedure Management   Prior to Admission Medications   Prescriptions Last Dose Informant Patient Reported? Taking?   Prenatal Vit-Fe Fumarate-FA (Prenatal Plus Vitamin/Mineral) 27-1 MG TABS  Self No No   Sig: Take 1 tablet by mouth in the morning   Patient not taking: Reported  on 1/7/2025   metoclopramide (REGLAN) 10 mg tablet   No No   Sig: Take 1 tablet (10 mg total) by mouth every 6 (six) hours   propylthiouracil 50 mg tablet   No No   Sig: TAKE 1 TABLET BY MOUTH EVERY DAY      Facility-Administered Medications: None     Discharge Medication List as of 5/31/2025  5:09 PM        CONTINUE these medications which have NOT CHANGED    Details   metoclopramide (REGLAN) 10 mg tablet Take 1 tablet (10 mg total) by mouth every 6 (six) hours, Starting Mon 5/26/2025, Normal      Prenatal Vit-Fe Fumarate-FA (Prenatal Plus Vitamin/Mineral) 27-1 MG TABS Take 1 tablet by mouth in the morning, Starting Wed 7/31/2024, Normal      propylthiouracil 50 mg tablet TAKE 1 TABLET BY MOUTH EVERY DAY, Starting Thu 5/1/2025, Normal           No discharge procedures on file.  ED SEPSIS DOCUMENTATION   Time reflects when diagnosis was documented in both MDM as applicable and the Disposition within this note       Time User Action Codes Description Comment    5/31/2025  5:08 PM Braxton Hooks Add [O20.0] Threatened miscarriage in early pregnancy                    Braxton Hooks MD  05/31/25 1708         [1]   Past Medical History:  Diagnosis Date    Asthma     Childhood    Chlamydia 2022    treated    Depression     GERD (gastroesophageal reflux disease)     Hyperthyroidism     Postoperative wound infection 4/28/2023    big abscess   [2]   Past Surgical History:  Procedure Laterality Date    NO PAST SURGERIES     [3]   Family History  Problem Relation Name Age of Onset    Thyroid disease Mother Nury Brewer     Thyroid disease unspecified Mother Nury Brewer     Depression Mother Nury Brewer     Mental illness Mother Nury Brewer     Cancer Father Vidal Casanova         Hodgkin's    Hodgkin's lymphoma Father Vidal Casanova     Breast cancer Neg Hx      Colon cancer Neg Hx      Ovarian cancer Neg Hx     [4]   Social History  Tobacco Use    Smoking status: Never     Passive exposure: Never    Smokeless  tobacco: Never   Vaping Use    Vaping status: Every Day    Substances: Nicotine, Flavoring   Substance Use Topics    Alcohol use: Yes     Alcohol/week: 2.0 standard drinks of alcohol     Types: 2 Shots of liquor per week     Comment: couple times/month    Drug use: Yes     Frequency: 7.0 times per week     Types: Marijuana     Comment: daily marijuana use        Braxton Hooks MD  05/31/25 5024

## 2025-05-31 NOTE — DISCHARGE INSTRUCTIONS
Please follow-up with your obstetrician and return to the emergency department for any worrisome symptoms which we discussed during your visit today.

## 2025-06-01 LAB — BACTERIA UR CULT: NORMAL

## 2025-06-02 ENCOUNTER — APPOINTMENT (OUTPATIENT)
Dept: LAB | Facility: HOSPITAL | Age: 25
End: 2025-06-02
Payer: COMMERCIAL

## 2025-06-02 DIAGNOSIS — O20.0 THREATENED ABORTION IN EARLY PREGNANCY: ICD-10-CM

## 2025-06-02 DIAGNOSIS — O20.0 THREATENED ABORTION IN EARLY PREGNANCY: Primary | ICD-10-CM

## 2025-06-02 LAB — B-HCG SERPL-ACNC: ABNORMAL MIU/ML (ref 0–5)

## 2025-06-02 PROCEDURE — 84702 CHORIONIC GONADOTROPIN TEST: CPT

## 2025-06-02 PROCEDURE — 36415 COLL VENOUS BLD VENIPUNCTURE: CPT

## 2025-06-04 ENCOUNTER — APPOINTMENT (OUTPATIENT)
Dept: LAB | Facility: HOSPITAL | Age: 25
End: 2025-06-04
Payer: COMMERCIAL

## 2025-06-04 DIAGNOSIS — R79.89 LOW TSH LEVEL: Primary | ICD-10-CM

## 2025-06-04 LAB
B-HCG SERPL-ACNC: ABNORMAL MIU/ML (ref 0–5)
T4 FREE SERPL-MCNC: 0.81 NG/DL (ref 0.61–1.12)
TSH SERPL DL<=0.05 MIU/L-ACNC: 0.9 UIU/ML (ref 0.45–4.5)

## 2025-06-04 PROCEDURE — 36415 COLL VENOUS BLD VENIPUNCTURE: CPT

## 2025-06-04 PROCEDURE — 84439 ASSAY OF FREE THYROXINE: CPT

## 2025-06-04 PROCEDURE — 84702 CHORIONIC GONADOTROPIN TEST: CPT

## 2025-06-04 PROCEDURE — 84443 ASSAY THYROID STIM HORMONE: CPT

## 2025-06-05 ENCOUNTER — RESULTS FOLLOW-UP (OUTPATIENT)
Dept: OBGYN CLINIC | Facility: CLINIC | Age: 25
End: 2025-06-05

## 2025-06-05 NOTE — PROGRESS NOTES
Name: Shelli Casanova      : 2000      MRN: [id number]  Encounter Provider: Radha Sales MD  Encounter Date: 2025   Encounter department: [facility]  :  Assessment & Plan  Amenorrhea  Assessment  24 y.o.  presenting for amenorrhea. Pregnancy confirmed, dating consistent with LMP. CRISTY 2026.    Plan  - Prenatal vitamin  - Prenatal panel (slips given today)  - Discussed genetic screening, carrier screening ordered   - Prenatal Nursing Intake in 2 weeks  - Prenatal H&P in 4 weeks     Orders:    Ambulatory Referral to Maternal Fetal Medicine; Future    HIV 1/2 AG/AB w Reflex [facility] for 2 yr old and above; Future    Hepatitis C antibody; Future    UA (URINE) with reflex to Scope; Future    Urine culture; Future    Hepatitis B surface antigen; Future    CBC and differential; Future    Rubella antibody, IgG; Future    Type and screen; Future    RPR-Syphilis Screening (Total Syphilis IGG/IGM); Future    Hepatitis B surface antibody; Future    Anemia Panel w/Reflex, OB; Future    PRENATAL CARRIER PANEL (CFVANTAGE, FRAGILE X, SMA); Future    AMB US OB < 14 weeks single or first gestation level 1        History of Present Illness   HPI  Shelli Casanova is a 24 y.o. female who presents  with an LMP of Patient's last menstrual period was 2025 (exact date). who presents for amenorrhea. She notes she took several home pregnancy tests and it was positive. She has been seen in the ED several times due to vaginal bleeding. This started about two weeks ago and has continued since. She says it is mostly spotting but is sometimes bright red.     Patient notes that this pregnancy was planned. She reports she is certain of her LMP and that she has regular menses. She has a history of one prior misccarage and is understandaby anxious. We discussed  that there is still ongoing concern for a possible evolving miscarriage, but at this point in time things are  "looking reassuring. I encouraged her to call if her bleeding were to increase.     PMHx is signifigant for Hyperthyroidism for which she takes 50mg of PTU. Her most recent thyroid testing was all within normal limits. She has an appt scheduled with her endocrinologist and was instructed to ask about the need for continuing this med during pregnancy at that time.         Review of Systems   Constitutional:  Negative for chills and fever.   HENT:  Negative for ear pain and sore throat.    Eyes:  Negative for pain and visual disturbance.   Respiratory:  Negative for cough and shortness of breath.    Cardiovascular:  Negative for chest pain and palpitations.   Gastrointestinal:  Negative for abdominal pain and vomiting.   Genitourinary:  Positive for vaginal bleeding. Negative for dysuria and hematuria.   Musculoskeletal:  Negative for arthralgias and back pain.   Skin:  Negative for color change and rash.   Neurological:  Negative for seizures and syncope.   All other systems reviewed and are negative.         Objective   /58 (BP Location: Right arm, Patient Position: Sitting, Cuff Size: Large)   Pulse 72   Resp 18   Ht 5' 6.25\" (1.683 m)   Wt 83.3 kg (183 lb 9.6 oz)   LMP 04/14/2025 (Exact Date)   BMI 29.41 kg/m²      Physical Exam  Vitals and nursing note reviewed.   Constitutional:       General: She is not in acute distress.     Appearance: She is well-developed.   HENT:      Head: Normocephalic and atraumatic.     Eyes:      Conjunctiva/sclera: Conjunctivae normal.       Cardiovascular:      Rate and Rhythm: Normal rate and regular rhythm.      Heart sounds: No murmur heard.  Pulmonary:      Effort: Pulmonary effort is normal. No respiratory distress.      Breath sounds: Normal breath sounds.   Abdominal:      Palpations: Abdomen is soft.      Tenderness: There is no abdominal tenderness.     Musculoskeletal:         General: No swelling.      Cervical back: Neck supple.     Skin:     General: Skin is " warm and dry.      Capillary Refill: Capillary refill takes less than 2 seconds.     Neurological:      Mental Status: She is alert.     Psychiatric:         Mood and Affect: Mood normal.             FHR: 137bpm    Radha Sales MD  OBGYN PGY-3  6/6/2025 11:59 AM

## 2025-06-05 NOTE — TELEPHONE ENCOUNTER
----- Message from Barbie Waters MD sent at 6/5/2025  4:39 PM EDT -----  Please advise the patient that she continues to have a rise in her hcg. She may keep her D&V as scheduled.   ----- Message -----  From: Lab, Background User  Sent: 6/2/2025   3:21 PM EDT  To: Barbie Waters MD

## 2025-06-05 NOTE — TELEPHONE ENCOUNTER
RN placed a call to patient with update. Patient verbalized an understanding. Confirmed appointment. No further questions.

## 2025-06-06 ENCOUNTER — ULTRASOUND (OUTPATIENT)
Dept: OBGYN CLINIC | Facility: CLINIC | Age: 25
End: 2025-06-06

## 2025-06-06 VITALS
SYSTOLIC BLOOD PRESSURE: 103 MMHG | RESPIRATION RATE: 18 BRPM | WEIGHT: 183.6 LBS | DIASTOLIC BLOOD PRESSURE: 58 MMHG | HEIGHT: 66 IN | BODY MASS INDEX: 29.51 KG/M2 | HEART RATE: 72 BPM

## 2025-06-06 DIAGNOSIS — N91.2 AMENORRHEA: Primary | ICD-10-CM

## 2025-06-09 ENCOUNTER — APPOINTMENT (OUTPATIENT)
Dept: LAB | Facility: HOSPITAL | Age: 25
End: 2025-06-09
Payer: COMMERCIAL

## 2025-06-09 DIAGNOSIS — N91.2 AMENORRHEA: ICD-10-CM

## 2025-06-09 LAB
ABO GROUP BLD: NORMAL
BACTERIA UR QL AUTO: ABNORMAL /HPF
BASOPHILS # BLD AUTO: 0.04 THOUSANDS/ÂΜL (ref 0–0.1)
BASOPHILS NFR BLD AUTO: 0 % (ref 0–1)
BILIRUB UR QL STRIP: NEGATIVE
BLD GP AB SCN SERPL QL: NEGATIVE
CLARITY UR: CLEAR
COLOR UR: COLORLESS
EOSINOPHIL # BLD AUTO: 0.23 THOUSAND/ÂΜL (ref 0–0.61)
EOSINOPHIL NFR BLD AUTO: 2 % (ref 0–6)
ERYTHROCYTE [DISTWIDTH] IN BLOOD BY AUTOMATED COUNT: 13.6 % (ref 11.6–15.1)
GLUCOSE UR STRIP-MCNC: NEGATIVE MG/DL
HBV SURFACE AB SER-ACNC: 3.16 MIU/ML
HBV SURFACE AG SER QL: NORMAL
HCT VFR BLD AUTO: 37.5 % (ref 34.8–46.1)
HCV AB SER QL: NORMAL
HGB BLD-MCNC: 12.6 G/DL (ref 11.5–15.4)
HGB UR QL STRIP.AUTO: ABNORMAL
HIV 1+2 AB+HIV1 P24 AG SERPL QL IA: NORMAL
IMM GRANULOCYTES # BLD AUTO: 0.04 THOUSAND/UL (ref 0–0.2)
IMM GRANULOCYTES NFR BLD AUTO: 0 % (ref 0–2)
KETONES UR STRIP-MCNC: NEGATIVE MG/DL
LEUKOCYTE ESTERASE UR QL STRIP: ABNORMAL
LYMPHOCYTES # BLD AUTO: 3.04 THOUSANDS/ÂΜL (ref 0.6–4.47)
LYMPHOCYTES NFR BLD AUTO: 26 % (ref 14–44)
MCH RBC QN AUTO: 29.6 PG (ref 26.8–34.3)
MCHC RBC AUTO-ENTMCNC: 33.6 G/DL (ref 31.4–37.4)
MCV RBC AUTO: 88 FL (ref 82–98)
MONOCYTES # BLD AUTO: 0.7 THOUSAND/ÂΜL (ref 0.17–1.22)
MONOCYTES NFR BLD AUTO: 6 % (ref 4–12)
NEUTROPHILS # BLD AUTO: 7.54 THOUSANDS/ÂΜL (ref 1.85–7.62)
NEUTS SEG NFR BLD AUTO: 66 % (ref 43–75)
NITRITE UR QL STRIP: NEGATIVE
NON-SQ EPI CELLS URNS QL MICRO: ABNORMAL /HPF
NRBC BLD AUTO-RTO: 0 /100 WBCS
PH UR STRIP.AUTO: 7 [PH]
PLATELET # BLD AUTO: 329 THOUSANDS/UL (ref 149–390)
PMV BLD AUTO: 9.3 FL (ref 8.9–12.7)
PROT UR STRIP-MCNC: NEGATIVE MG/DL
RBC # BLD AUTO: 4.25 MILLION/UL (ref 3.81–5.12)
RBC #/AREA URNS AUTO: ABNORMAL /HPF
RH BLD: POSITIVE
RUBV IGG SERPL IA-ACNC: 24.6 IU/ML
SP GR UR STRIP.AUTO: 1.01 (ref 1–1.03)
SPECIMEN EXPIRATION DATE: NORMAL
TREPONEMA PALLIDUM IGG+IGM AB [PRESENCE] IN SERUM OR PLASMA BY IMMUNOASSAY: NORMAL
UROBILINOGEN UR STRIP-ACNC: <2 MG/DL
WBC # BLD AUTO: 11.59 THOUSAND/UL (ref 4.31–10.16)
WBC #/AREA URNS AUTO: ABNORMAL /HPF

## 2025-06-09 PROCEDURE — 86900 BLOOD TYPING SEROLOGIC ABO: CPT

## 2025-06-09 PROCEDURE — 86762 RUBELLA ANTIBODY: CPT

## 2025-06-09 PROCEDURE — 87086 URINE CULTURE/COLONY COUNT: CPT

## 2025-06-09 PROCEDURE — 86780 TREPONEMA PALLIDUM: CPT

## 2025-06-09 PROCEDURE — 85025 COMPLETE CBC W/AUTO DIFF WBC: CPT

## 2025-06-09 PROCEDURE — 86850 RBC ANTIBODY SCREEN: CPT

## 2025-06-09 PROCEDURE — 87340 HEPATITIS B SURFACE AG IA: CPT

## 2025-06-09 PROCEDURE — 86803 HEPATITIS C AB TEST: CPT

## 2025-06-09 PROCEDURE — 81001 URINALYSIS AUTO W/SCOPE: CPT

## 2025-06-09 PROCEDURE — 36415 COLL VENOUS BLD VENIPUNCTURE: CPT

## 2025-06-09 PROCEDURE — 87389 HIV-1 AG W/HIV-1&-2 AB AG IA: CPT

## 2025-06-09 PROCEDURE — 86706 HEP B SURFACE ANTIBODY: CPT

## 2025-06-09 PROCEDURE — 86901 BLOOD TYPING SEROLOGIC RH(D): CPT

## 2025-06-11 LAB — BACTERIA UR CULT: NORMAL

## 2025-06-13 ENCOUNTER — RESULTS FOLLOW-UP (OUTPATIENT)
Dept: LABOR AND DELIVERY | Facility: HOSPITAL | Age: 25
End: 2025-06-13

## 2025-06-17 ENCOUNTER — PATIENT OUTREACH (OUTPATIENT)
Dept: OBGYN CLINIC | Facility: CLINIC | Age: 25
End: 2025-06-17

## 2025-06-17 ENCOUNTER — INITIAL PRENATAL (OUTPATIENT)
Dept: OBGYN CLINIC | Facility: CLINIC | Age: 25
End: 2025-06-17

## 2025-06-17 DIAGNOSIS — Z34.91 PRENATAL CARE IN FIRST TRIMESTER, UNSPECIFIED GRAVIDITY: Primary | ICD-10-CM

## 2025-06-17 PROCEDURE — PNV

## 2025-06-17 NOTE — PROGRESS NOTES
MORIAH GUTHRIE met with 25 y/o- S- G2:P0:AB1-  English speaking woman to complete pre  assessment. Pt resides with [facility] and states both are happy with the intended pregnancy. Pt is self employed, FOB works and there is no financial concerns. Pt has commercial insurance and will apply for MA as secondary. Pt will also call WIC to apply. Pt denies any alcohol or smoking usage. Pt quit THC smoking as soon as she learned that she was pregnant.     Pt reported Hx of depression/ Anxiety, no treatment since . Pt denies depressed mood but endorses recurrent night johnson that trigger some anxious feelings in the morning. MORIAH GUTHRIE provided listening counseling. Pt was able to identified that her night johnson were the same and has no foundation. Pt will practice journaling about her dreams and will write why they are not true or real. Pt receptive to speak with Provider or this MORIAH GUTHRIE if s & sx's persist or worsen.    Pt denies transportation needs. Pt denies any domestic violence hx. Pt claimed FOB and her parents are very supportive. Pt denies any hx of child abuse or C&Y involvement. Pt denies any legal issues except a DUI several years ago. MORIAH GUTHRIE explained her role and provided contact information. Pt was encouraged to call at any time needed.

## 2025-06-17 NOTE — PROGRESS NOTES
OB INTAKE INTERVIEW  Pt presents for OB intake.     OB History    Para Term  AB Living   2 0 0 0 1 0   SAB IAB Ectopic Multiple Live Births   1 0 0 0 0      # Outcome Date GA Lbr Sundeep/2nd Weight Sex Type Anes PTL Lv   2 Current            1 SAB 2023     SAB        Hx of  delivery prior to 36 weeks 6 days:  N/A   If yes, place a referral for cervical surveillance at 16 weeks.   Last Menstrual Period:    Patient's last menstrual period was 2025 (exact date).     Ultrasound date: 2025  7 weeks 4 days     Estimated Date of Delivery: 26   by LMP  H&P visit scheduled. 2025 @ 1100  with Dr. Melton     Last pap smear: 2023  Findings; lab pap smear results: no abnormalities    Current Issues:  Constipation :   Yes  Headaches :   No  Cramping:  Yes  Spotting :   Yes, approx 1-2x/wk, light pink  PICA cravings :  No  FOB Involved:   Yes  Planned pregnancy:  Yes  I have these concerns about this prenatal patient:   Continued N/V. Has been too scared to try Reglan. Discussed and pt will try.  Discussed increasing fluids as pt estimates she drinks approx 16oz water/day  Requests emergency inhaler refill (Request forwarded to Dr. Sales).   Desires breastfeeding. Encouraged use of [facility] for lactation education and/or concerns. Order breast pump at 28wks        Interview education  [facility] Pregnancy Essentials reviewed and discussed   [facility] Handout  [facility] Handout  Discussed genetic testing  Prenatal lab work: Scripts printed and given to pt.   Influenza vaccine given today: N/A  Discussed Tdap vaccine.   Immunizations:   Immunization History   Administered Date(s) Administered    DTaP 5 2002, 2002, 2002, 2003, 2003    H1N1, All Formulations 2009    HPV Quadrivalent 2013, 03/10/2014, 2014    Hep A, ped/adol, 2 dose 08/15/2016    Hep B, adult 2002, 2002, 2003    Hib  (PRP-OMP) 2002, 2002, 11/13/2003    IPV 2002, 2002, 01/23/2003, 09/13/2006    Influenza, seasonal, injectable 11/13/2003, 10/08/2004, 11/03/2005, 01/25/2011, 10/28/2011, 10/17/2012, 03/10/2014    MMR 06/30/2003, 09/13/2006    Meningococcal MCV4, Unspecified 08/21/2013, 09/10/2019    Meningococcal MCV4P 09/10/2019    Meningococcal, Unknown Serogroups 08/21/2013    Pneumococcal Conjugate PCV 7 2002, 2002, 01/23/2003, 11/13/2003    Tdap 08/21/2013    Varicella 06/30/2003, 09/03/2008     Depression Screening Follow-up Plan: Patient's depression screening was negative with an Waterproof score of  3  Clinically patient does not have depression. No treatment is required..  Nurse/Family Partnership- referral placed:  Yes   If yes, place referral for nurse family partnership  Tobacco Cessation Counseling: non-smoker  Infection Screening: Does the pt have a hx of MRSA? No  If yes- please follow MRSA protocol and obtain a nasal swab for MRSA culture  The patient was oriented to our practice and all questions were answered.  Interviewed by: randy tejeda RN 06/17/25

## 2025-06-17 NOTE — LETTER
Work Letter    Shelli Casanova  2000  [address]  [place] [address]    Dear Shelli Casanova,      06/17/25        Your employee is a patient at [facility].    We recommend that all pregnant women:    1. Have a well-ventilated workspace.  2. Wear low-heeled shoes.  3. Work no more than 40 hours per week.  4. Have a 15 minute break every 2 hours and at least 30 minutes for a meal break.   5. Use good body mechanics by bending at your knees to avoid back strain and lift no more than 20 pounds without assistance. Will need assistance with lifting over 20 lbs.   6. Have ready access to bathrooms and water.      She may continue to work until her due date unless medical complications arise. We anticipate she may return to work in 6-8 weeks after delivery.     Sincerely,  [facility]

## 2025-06-17 NOTE — LETTER
Proof of Pregnancy Letter    Shelli Casanova  2000  [address]  [place] [address]        06/17/25      Shelli Casanova is a patient at our facility. Shelli Casanova Estimated Date of Delivery: 1/19/26       Any questions or concerns, please feel free to contact our office.    Sincerely,    [facility] [place]    [address]  [address]   [place] [id number] [phone]

## 2025-06-17 NOTE — LETTER
Dentist Letter    Shelli Casanova  2000  [address]  [place] [address]          06/17/25    We have had several requests from local dentist requesting permission to perform procedures on our patients who are pregnant. We wish to respond with this letter regarding some of the more routine procedures that we have been asked about.    The following procedures may be performed on our obstetric patients:   1. Administration of local anesthesia   2. Administration of antibiotics such as PCN, Ampicillin, and Erythromycin.   3. Administration of pain medications such as Tylenol, Tylenol with codeine, and if needed Percocet.   4. Shielded X-rays    Should you have any questions, please do not hesitate to contact at [phone].        Sincerely,    [facility]   [phone]

## 2025-06-17 NOTE — LETTER
[facility] Letter       06/17/25       Shelli Casanova  YOB: 2000  [address]  [place] [address]         Shelli Casanova is our patient and under our office's care.  Shelli's Estimated Date of Delivery: 1/19/26    Any questions or concerns feel free to contact our office.           Thank you,   [facility] [place]     [phone]   [facility]/[place]     [phone]        [facility]/[place]    [phone]    [facility]     [phone]        [facility]     [phone]        [facility]     [phone]        [facility]     [phone]        [facility]     [phone]   [place]     [phone]   [place]     [phone]

## 2025-06-24 ENCOUNTER — TELEPHONE (OUTPATIENT)
Dept: OBGYN CLINIC | Facility: CLINIC | Age: 25
End: 2025-06-24

## 2025-06-24 NOTE — TELEPHONE ENCOUNTER
Spoke with patient on the phone to discuss her [employer] message regarding serial beta hCGs.  We discussed that this is not something we routinely do once we have confirmed an intrauterine pregnancy as the numbers do not help us to predict clinical outcomes etc.  Patient is understanding of this.  Patient also inquired if she should purchase a Doppler.  I emphasized to patient that I do not recommend any of my patients purchase their own Doppler.  We reiterated that at her current gestational age (10w1d), is frequently difficult for myself as a clinician to find fetal tones using the Doppler and that I worry if she gets 1 for herself she will only cause herself more anxiety.   Patient is also understanding of this.  Had no further questions at this time encouraged to reach out if more occur.     Radha Sales MD  OBGYN PGY-3  6/24/2025 3:47 PM

## 2025-06-29 ENCOUNTER — HOSPITAL ENCOUNTER (EMERGENCY)
Facility: HOSPITAL | Age: 25
Discharge: HOME | End: 2025-06-29
Attending: EMERGENCY MEDICINE | Admitting: EMERGENCY MEDICINE
Payer: COMMERCIAL

## 2025-06-29 VITALS
HEART RATE: 76 BPM | WEIGHT: 186.07 LBS | SYSTOLIC BLOOD PRESSURE: 112 MMHG | OXYGEN SATURATION: 97 % | DIASTOLIC BLOOD PRESSURE: 61 MMHG | BODY MASS INDEX: 29.81 KG/M2 | RESPIRATION RATE: 18 BRPM | TEMPERATURE: 98 F

## 2025-06-29 DIAGNOSIS — O20.9 VAGINAL BLEEDING IN PREGNANCY, FIRST TRIMESTER: Primary | ICD-10-CM

## 2025-06-29 LAB
BACTERIA UR QL AUTO: ABNORMAL /HPF
BILIRUB UR QL STRIP: NEGATIVE
CLARITY UR: CLEAR
COLOR UR: YELLOW
GLUCOSE UR STRIP-MCNC: NEGATIVE MG/DL
HGB UR QL STRIP.AUTO: ABNORMAL
KETONES UR STRIP-MCNC: NEGATIVE MG/DL
LEUKOCYTE ESTERASE UR QL STRIP: NEGATIVE
MUCOUS THREADS UR QL AUTO: ABNORMAL
NITRITE UR QL STRIP: NEGATIVE
NON-SQ EPI CELLS URNS QL MICRO: ABNORMAL /HPF
PH UR STRIP.AUTO: 6.5 [PH] (ref 4.5–8)
PROT UR STRIP-MCNC: NEGATIVE MG/DL
RBC #/AREA URNS AUTO: ABNORMAL /HPF
SP GR UR STRIP.AUTO: 1.02 (ref 1–1.03)
TRANS CELLS #/AREA URNS HPF: PRESENT /[HPF]
UROBILINOGEN UR QL STRIP.AUTO: 0.2 E.U./DL
WBC #/AREA URNS AUTO: ABNORMAL /HPF

## 2025-06-29 PROCEDURE — 99284 EMERGENCY DEPT VISIT MOD MDM: CPT

## 2025-06-29 PROCEDURE — 99284 EMERGENCY DEPT VISIT MOD MDM: CPT | Performed by: EMERGENCY MEDICINE

## 2025-06-29 PROCEDURE — 81001 URINALYSIS AUTO W/SCOPE: CPT

## 2025-06-29 PROCEDURE — 87086 URINE CULTURE/COLONY COUNT: CPT

## 2025-06-29 NOTE — ED PROVIDER NOTES
Time reflects when diagnosis was documented in both MDM as applicable and the Disposition within this note       Time User Action Codes Description Comment    2025  3:33 PM Carla Wood Add [O20.9] Vaginal bleeding in pregnancy, first trimester           ED Disposition       ED Disposition   Discharge    Condition   Stable    Date/Time   Sun 2025  3:33 PM    Comment   Shelli Casanova discharge to home/self care.                   Assessment & Plan       Medical Decision Making  A 24 yo female who is  at 10w6d gestation presents with vaginal bleeding that occurred this morning, has since resolved.  Bedside ultrasound is reassuring, appropriate fetal heart rate and fetal growth.  Reassurance provided.  On review patient is Rh positive.    Amount and/or Complexity of Data Reviewed  Labs: ordered. Decision-making details documented in ED Course.        ED Course as of 25 1612   Sun 2025   1532 Urine Macroscopic, POC(!)  Negative        Medications - No data to display    ED Risk Strat Scores                    No data recorded        SBIRT 20yo+      Flowsheet Row Most Recent Value   Initial Alcohol Screen: US AUDIT-C     1. How often do you have a drink containing alcohol? 0 Filed at: 2025 1437   2. How many drinks containing alcohol do you have on a typical day you are drinking?  0 Filed at: 2025 1437   3b. FEMALE Any Age, or MALE 65+: How often do you have 4 or more drinks on one occassion? 0 Filed at: 2025 143   Audit-C Score 0 Filed at: 2025 1437   CELESTINE: How many times in the past year have you...    Used an illegal drug or used a prescription medication for non-medical reasons? Never Filed at: 2025 1437                            History of Present Illness       Chief Complaint   Patient presents with    Vaginal Bleeding - Pregnant     Pt 11 weeks pregnant experiencing bleeding. Reports it was very light until today she passed a clot the size of a  golf ball. States it has slowed down since then.        Past Medical History[1]   Past Surgical History[2]   Family History[3]   Social History[4]   E-Cigarette/Vaping    E-Cigarette Use Former User       E-Cigarette/Vaping Substances    Nicotine Yes     THC No     CBD No     Flavoring Yes     Other No     Unknown No       I have reviewed and agree with the history as documented.     A 26 yo female who is  at 10w6d gestation; presents with vaginal bleeding.  Patient states she has had intermittent vaginal bleeding throughout her pregnancy.  She reports bleeding occurred this morning, initially described as spotting and then passed a quarter sized blood clot.  She has not had any additional bleeding.  She reports intermittent abdominal/pelvic cramping.  Patient has otherwise not had fever, chills, chest pain, shortness of breath, diarrhea, dysuria, peripheral edema and rashes.  She has had ongoing nausea and vomiting throughout the pregnancy although believes it is improving.  She is followed at [facility].      History provided by:  Patient and medical records      Review of Systems   Genitourinary:  Positive for pelvic pain and vaginal bleeding.   All other systems reviewed and are negative.      Objective       ED Triage Vitals [25 1436]   Temperature Pulse Blood Pressure Respirations SpO2 Patient Position - Orthostatic VS   98 °F (36.7 °C) 76 112/61 18 97 % Lying      Temp Source Heart Rate Source BP Location FiO2 (%) Pain Score    Oral Monitor Right arm -- No Pain      Vitals      Date and Time Temp Pulse SpO2 Resp BP Pain Score FACES Pain Rating User   25 1436 98 °F (36.7 °C) 76 97 % 18 112/61 No Pain -- LB            Physical Exam  General Appearance: alert and oriented, nad, non toxic appearing  Skin:  Warm, dry, intact.  No cyanosis  HEENT: Atraumatic, normocephalic.  No eye drainage.  Normal hearing.  Moist mucous membranes.    Neck: Supple, trachea midline  Cardiac: RRR; no murmurs, rub,  gallops.  No pedal edema, 2+ pulses  Pulmonary: lungs CTAB; no wheezes, rales, rhonchi  Gastrointestinal: abdomen soft, nontender, nondistended; no guarding or rebound tenderness; good bowel sounds, no mass or bruits  Extremities:  No deformities.  No calf tenderness, no clubbing  Neuro:  no focal motor or sensory deficits, CN 2-12 grossly intact  Psych:  Normal mood and affect, normal judgement and insight       Results Reviewed       Procedure Component Value Units Date/Time    Urine Microscopic [[id number]]  (Abnormal) Collected: 06/29/25 1531    Lab Status: Final result Specimen: Urine, Clean Catch Updated: 06/29/25 1549     RBC, UA 1-2 /hpf      WBC, UA 4-10 /hpf      Epithelial Cells Occasional /hpf      Bacteria, UA Occasional /hpf      MUCUS THREADS Occasional     Transitional Epithelial Cells Present    Urine culture [[id number]] Collected: 06/29/25 1531    Lab Status: In process Specimen: Urine, Clean Catch Updated: 06/29/25 1535    Urine Macroscopic, POC [[id number]]  (Abnormal) Collected: 06/29/25 1531    Lab Status: Final result Specimen: Urine Updated: 06/29/25 1532     Color, UA Yellow     Clarity, UA Clear     pH, UA 6.5     Leukocytes, UA Negative     Nitrite, UA Negative     Protein, UA Negative mg/dl      Glucose, UA Negative mg/dl      Ketones, UA Negative mg/dl      Urobilinogen, UA 0.2 E.U./dl      Bilirubin, UA Negative     Occult Blood, UA Trace     Specific Gravity, UA 1.020    Narrative:      CLINITEK RESULT            No orders to display       POC Pelvic US    Date/Time: 6/29/2025 3:01 PM    Performed by: Carla Wood DO  Authorized by: Carla Wood DO    Patient location:  ED  Procedure details:     Exam Type:  Diagnostic    Indications: pregnant with vaginal bleeding      Assessment for: evaluate fetal viability      Technique:  Transabdominal obstetric (HCG+) exam    Views obtained: uterus (transverse and sagittal)      Image quality: diagnostic      Image availability:  Images  available in PACS  Uterine findings:     Endometrial stripe: identified      Intrauterine pregnancy: identified      Single gestation: identified      Gestational sac: identified      Yolk sac: identified      Fetal pole: identified      Fetal heart rate: identified      Fetal heart rate (bpm):  152    Crown-rump length (mm):  34  Interpretation:     Ultrasound impressions: normal      Pregnancy findings: intrauterine pregnancy (IUP)        ED Medication and Procedure Management   Prior to Admission Medications   Prescriptions Last Dose Informant Patient Reported? Taking?   Prenatal MV-Min-Fe Fum-FA-DHA (PRENATAL 1 PO)   Yes No   Sig: Take by mouth   Prenatal Vit-Fe Fumarate-FA (Prenatal Plus Vitamin/Mineral) 27-1 MG TABS  Self No No   Sig: Take 1 tablet by mouth in the morning   Patient not taking: Reported on 1/7/2025   metoclopramide (REGLAN) 10 mg tablet   No No   Sig: Take 1 tablet (10 mg total) by mouth every 6 (six) hours   propylthiouracil 50 mg tablet   No No   Sig: TAKE 1 TABLET BY MOUTH EVERY DAY      Facility-Administered Medications: None     Discharge Medication List as of 6/29/2025  3:35 PM        CONTINUE these medications which have NOT CHANGED    Details   metoclopramide (REGLAN) 10 mg tablet Take 1 tablet (10 mg total) by mouth every 6 (six) hours, Starting Mon 5/26/2025, Normal      Prenatal MV-Min-Fe Fum-FA-DHA (PRENATAL 1 PO) Take by mouth, Historical Med      Prenatal Vit-Fe Fumarate-FA (Prenatal Plus Vitamin/Mineral) 27-1 MG TABS Take 1 tablet by mouth in the morning, Starting Wed 7/31/2024, Normal      propylthiouracil 50 mg tablet TAKE 1 TABLET BY MOUTH EVERY DAY, Starting Thu 5/1/2025, Normal           No discharge procedures on file.  ED SEPSIS DOCUMENTATION   Time reflects when diagnosis was documented in both MDM as applicable and the Disposition within this note       Time User Action Codes Description Comment    6/29/2025  3:33 PM Carla Wood Add [O20.9] Vaginal bleeding in  pregnancy, first trimester                      [1]   Past Medical History:  Diagnosis Date    Asthma     Childhood    Chlamydia 2022    treated    Depression     GERD (gastroesophageal reflux disease)     Hyperthyroidism     Postoperative wound infection 4/28/2023    big abscess   [2]   Past Surgical History:  Procedure Laterality Date    NO PAST SURGERIES     [3]   Family History  Problem Relation Name Age of Onset    Thyroid disease Mother Nury Brewer     Depression Mother Nury Brewer     Mental illness Mother Nury Brewer     Fibromyalgia Mother Nury Brewer     Thyroid disease Father Vidal Casanova     Cancer Father Vidal Casanova         Hodgkin's    Hodgkin's lymphoma Father Vidal Casanova     No Known Problems Sister      No Known Problems Brother      Breast cancer Neg Hx      Colon cancer Neg Hx      Ovarian cancer Neg Hx      Heart attack Neg Hx      Stroke Neg Hx     [4]   Social History  Tobacco Use    Smoking status: Never     Passive exposure: Never    Smokeless tobacco: Never   Vaping Use    Vaping status: Former    Substances: Nicotine, Flavoring   Substance Use Topics    Alcohol use: Not Currently     Alcohol/week: 2.0 standard drinks of alcohol     Types: 2 Shots of liquor per week     Comment: couple times/month    Drug use: Not Currently     Frequency: 7.0 times per week     Types: Marijuana     Comment: Last use 5/17/2025        Carla Wood DO  06/29/25 1610

## 2025-07-01 LAB — BACTERIA UR CULT: ABNORMAL

## 2025-07-06 NOTE — PROGRESS NOTES
OB/GYN PRENATAL H&P  Shelli Casanova  2025  12:14 PM  Dr. Ama Melton MD    SUBJECTIVE:  Shelli Casanova is a 25 y.o.  at 11w6d presenting for initial prenatal H&P.  This is an intended and desired pregnancy dated by LMP which was consistent with 1st trimester US.    Today, she has no complaints and denies pelvic pain, cramping/contractions, vaginal bleeding, loss of fluid, and decreased fetal movement.    STI history: Chlamydia at age of 18 s/p treatment  History of or exposure to TB: none  History of MRSA: none  Family history of developmental delay, intellectual disability, or inheritable conditions: none  Recent/future travel outside of the country: none  Substance use this pregnancy (e.g., alcohol, smoking, vaping, marijuana, heroine, cocaine, other substances): none    OB History    Para Term  AB Living   2 0 0 0 1 0   SAB IAB Ectopic Multiple Live Births   1 0 0 0 0      # Outcome Date GA Lbr Sundeep/2nd Weight Sex Type Anes PTL Lv   2 Current            1 SAB 2023     SAB          Review of Systems   Constitutional:  Negative for chills and fever.   Gastrointestinal:  Negative for abdominal pain, nausea and vomiting.   Genitourinary:  Negative for vaginal bleeding.   Musculoskeletal:  Negative for back pain.   Skin:  Negative for rash.   Neurological:  Negative for headaches.       Past Medical History[1]    Past Surgical History[2]    Social History     Socioeconomic History    Marital status: Single     Spouse name: Not on file    Number of children: Not on file    Years of education: Not on file    Highest education level: Not on file   Occupational History    Not on file   Tobacco Use    Smoking status: Never     Passive exposure: Never    Smokeless tobacco: Never   Vaping Use    Vaping status: Former    Substances: Nicotine, Flavoring   Substance and Sexual Activity    Alcohol use: Not Currently     Alcohol/week: 2.0 standard drinks of alcohol     Types: 2 Shots of liquor  per week     Comment: couple times/month    Drug use: Not Currently     Frequency: 7.0 times per week     Types: Marijuana     Comment: Last use 5/17/2025    Sexual activity: Yes     Partners: Male     Birth control/protection: None   Other Topics Concern    Not on file   Social History Narrative    Not on file     Social Drivers of Health     Financial Resource Strain: Low Risk  (6/6/2025)    Overall Financial Resource Strain (CARDIA)     Difficulty of Paying Living Expenses: Not hard at all   Food Insecurity: No Food Insecurity (6/6/2025)    Hunger Vital Sign     Worried About Running Out of Food in the Last Year: Never true     Ran Out of Food in the Last Year: Never true   Transportation Needs: No Transportation Needs (6/6/2025)    PRAPARE - Transportation     Lack of Transportation (Medical): No     Lack of Transportation (Non-Medical): No   Physical Activity: Sufficiently Active (9/1/2022)    Received from [facility]    Exercise Vital Sign     Days of Exercise per Week: 3 days     Minutes of Exercise per Session: 100 min   Stress: Stress Concern Present (9/1/2022)    Received from [facility]    [place] [employer] [place] [employer] - Occupational Stress Questionnaire     Feeling of Stress : Very much   Social Connections: Moderately Isolated (9/1/2022)    Received from [facility]    Social Connection and Isolation Panel     In a typical week, how many times do you talk on the phone with family, friends, or neighbors?: Twice a week     How often do you get together with friends or relatives?: Once a week     How often do you attend Religious or Protestant services?: Never     Do you belong to any clubs or organizations such as Religious groups, unions, fraternal or athletic groups, or school groups?: No     How often do you attend meetings of the clubs or organizations you belong to?: Never     Are you , , , , never , or  living with a partner?: Living with partner   Intimate Partner Violence: Not At Risk (9/1/2022)    Received from [facility]    Humiliation, Afraid, Rape, and Kick questionnaire     Fear of Current or Ex-Partner: No     Emotionally Abused: No     Physically Abused: No     Sexually Abused: No   Housing Stability: Low Risk  (6/6/2025)    Housing Stability Vital Sign     Unable to Pay for Housing in the Last Year: No     Number of Times Moved in the Last Year: 1     Homeless in the Last Year: No       OBJECTIVE:  Vitals:  Vitals:    07/07/25 1108   BP: 93/59   Pulse: 77   Resp: 18       Physical Exam  Constitutional:       General: She is not in acute distress.     Appearance: Normal appearance.   Genitourinary:      Genitourinary Comments: Normal vulva, vaginal mucosa, cervix without lesions     Cardiovascular:      Rate and Rhythm: Normal rate.   Pulmonary:      Effort: Pulmonary effort is normal. No respiratory distress.   Chest:      Comments: Normal bilateral breasts  Abdominal:      Palpations: Abdomen is soft.      Tenderness: There is no abdominal tenderness.     Neurological:      Mental Status: She is alert.   Vitals reviewed.         FHT: 150 bpm      ASSESSMENT / PLAN:        Problem List          Respiratory    Asthma    Overview   - Intermittent albuterol use            Digestive    GERD (gastroesophageal reflux disease)    Overview   Last Assessment & Plan:       Referred to GI .  Symptoms present before saxenda and has not got worse with it. No longer taking omeprazole but does not feel it was helping            Endocrine    Hyperthyroidism    Overview   - Currently taking PTU 50 qday, plan to transition to Methimazole at 14 weeks  - Follows with Endocrinology  - q Trimester TFTs            Behavioral Health    Anxiety       Obstetrics/Gynecology    12 weeks gestation of pregnancy    Current Assessment & Plan   Overview:  Labs  Pap smear UTD; GC/CT Collected 7/7 [ ]   Prenatal panel  wnl  28w labs[ ]   Vaccines:  Flu vaccine: [ ]  Covid vaccine: [ ]  Tdap vaccine: [ ]  Genetic screening: NIPT scheduled  [ ]   TWG [ ] lb  Pre-gravid BMI 29  Recommended weight gain 15-25 lb  Contraception: [ ]  Feeding plan: breast/bottle[ ]  Birth plan:  with epidural[ ]   Delivery consent: to be signed at 28w[ ]  Ultrasounds: [ ]    Assessment and Plan:  No obstetric complaints  Discussed  labor precautions and fetal kick counts  Return to clinic in 4 weeks              Other    BMI 33.0-33.9,adult    Elevated prolactin level    Encounter for preconception consultation     Other Visit Diagnoses         Screening for STD (sexually transmitted disease)    -  Primary      Asthma affecting pregnancy in first trimester                  Ama Melton MD  2025  12:29 PM             [1]   Past Medical History:  Diagnosis Date    Asthma     Childhood    Chlamydia     treated    Depression     GERD (gastroesophageal reflux disease)     Hyperthyroidism     Postoperative wound infection 2023    big abscess   [2]   Past Surgical History:  Procedure Laterality Date    NO PAST SURGERIES

## 2025-07-07 ENCOUNTER — ROUTINE PRENATAL (OUTPATIENT)
Dept: OBGYN CLINIC | Facility: CLINIC | Age: 25
End: 2025-07-07

## 2025-07-07 VITALS
RESPIRATION RATE: 18 BRPM | WEIGHT: 185.6 LBS | DIASTOLIC BLOOD PRESSURE: 59 MMHG | HEIGHT: 66 IN | SYSTOLIC BLOOD PRESSURE: 93 MMHG | BODY MASS INDEX: 29.83 KG/M2 | HEART RATE: 77 BPM

## 2025-07-07 DIAGNOSIS — J45.909 ASTHMA AFFECTING PREGNANCY IN FIRST TRIMESTER: ICD-10-CM

## 2025-07-07 DIAGNOSIS — J45.20 MILD INTERMITTENT ASTHMA, UNSPECIFIED WHETHER COMPLICATED: ICD-10-CM

## 2025-07-07 DIAGNOSIS — Z3A.12 12 WEEKS GESTATION OF PREGNANCY: ICD-10-CM

## 2025-07-07 DIAGNOSIS — Z11.3 SCREENING FOR STD (SEXUALLY TRANSMITTED DISEASE): Primary | ICD-10-CM

## 2025-07-07 DIAGNOSIS — E05.90 HYPERTHYROIDISM: ICD-10-CM

## 2025-07-07 DIAGNOSIS — O99.511 ASTHMA AFFECTING PREGNANCY IN FIRST TRIMESTER: ICD-10-CM

## 2025-07-07 PROCEDURE — 87591 N.GONORRHOEAE DNA AMP PROB: CPT

## 2025-07-07 PROCEDURE — 87491 CHLMYD TRACH DNA AMP PROBE: CPT

## 2025-07-07 RX ORDER — ALBUTEROL SULFATE 90 UG/1
2 INHALANT RESPIRATORY (INHALATION) EVERY 6 HOURS PRN
Qty: 8.5 G | Refills: 1 | Status: SHIPPED | OUTPATIENT
Start: 2025-07-07

## 2025-07-07 NOTE — ASSESSMENT & PLAN NOTE
Overview:  Labs  Pap smear UTD; GC/CT Collected  [ ]   Prenatal panel wnl  28w labs[ ]   Vaccines:  Flu vaccine: [ ]  Covid vaccine: [ ]  Tdap vaccine: [ ]  Genetic screening: NIPT scheduled  [ ]   TWG [ ] lb  Pre-gravid BMI 29  Recommended weight gain 15-25 lb  Contraception: [ ]  Feeding plan: breast/bottle[ ]  Birth plan:  with epidural[ ]   Delivery consent: to be signed at 28w[ ]  Ultrasounds: [ ]    Assessment and Plan:  No obstetric complaints  Discussed  labor precautions and fetal kick counts  Return to clinic in 4 weeks

## 2025-07-08 ENCOUNTER — ANCILLARY PROCEDURE (OUTPATIENT)
Age: 25
End: 2025-07-08
Attending: OBSTETRICS & GYNECOLOGY
Payer: COMMERCIAL

## 2025-07-08 ENCOUNTER — ROUTINE PRENATAL (OUTPATIENT)
Age: 25
End: 2025-07-08
Payer: COMMERCIAL

## 2025-07-08 VITALS
HEIGHT: 66 IN | DIASTOLIC BLOOD PRESSURE: 68 MMHG | HEART RATE: 99 BPM | BODY MASS INDEX: 29.89 KG/M2 | SYSTOLIC BLOOD PRESSURE: 106 MMHG | WEIGHT: 186 LBS

## 2025-07-08 DIAGNOSIS — J45.909 ASTHMA AFFECTING PREGNANCY IN FIRST TRIMESTER: ICD-10-CM

## 2025-07-08 DIAGNOSIS — E05.90 HYPERTHYROIDISM: ICD-10-CM

## 2025-07-08 DIAGNOSIS — F41.9 ANXIETY AND DEPRESSION: ICD-10-CM

## 2025-07-08 DIAGNOSIS — O99.511 ASTHMA AFFECTING PREGNANCY IN FIRST TRIMESTER: ICD-10-CM

## 2025-07-08 DIAGNOSIS — Z3A.12 12 WEEKS GESTATION OF PREGNANCY: ICD-10-CM

## 2025-07-08 DIAGNOSIS — F32.A ANXIETY AND DEPRESSION: ICD-10-CM

## 2025-07-08 DIAGNOSIS — Z36.0 ENCOUNTER FOR ANTENATAL SCREENING FOR CHROMOSOMAL ANOMALIES: Primary | ICD-10-CM

## 2025-07-08 LAB
C TRACH DNA SPEC QL NAA+PROBE: NEGATIVE
N GONORRHOEA DNA SPEC QL NAA+PROBE: NEGATIVE

## 2025-07-08 PROCEDURE — 36415 COLL VENOUS BLD VENIPUNCTURE: CPT | Performed by: OBSTETRICS & GYNECOLOGY

## 2025-07-08 PROCEDURE — 76813 OB US NUCHAL MEAS 1 GEST: CPT | Performed by: OBSTETRICS & GYNECOLOGY

## 2025-07-08 PROCEDURE — 99244 OFF/OP CNSLTJ NEW/EST MOD 40: CPT | Performed by: OBSTETRICS & GYNECOLOGY

## 2025-07-08 PROCEDURE — 76801 OB US < 14 WKS SINGLE FETUS: CPT | Performed by: OBSTETRICS & GYNECOLOGY

## 2025-07-08 NOTE — PROGRESS NOTES
Negative for intraepithelial lesion or malignancy  Normal pap smear Patient chose to have [facility] [id number] Non-Invasive Prenatal Screen [id number] PLUS w/ SCA, WITH fetal sex.  Patient choose to be billed through insurance.     Patient given brochure and is aware [facility] will contact patient's insurance and coordinate coverage.  Provided [facility] contact information. General inquiries [phone], Cost estimates [phone] and [facility] Billing [phone]. Website [email or web address]     Blood collection tubes labeled with patient identifiers (name, medical record number, and date of birth).     Filled out [facility] order form. Patient chose to have blood drawn in our office at time of visit. NIPS was drawn from left arm with a butterfly needle by Lynda ECHOLS MA.    Maternal Fetal Medicine will have results in approximately 5-7 business days and will call patient or notify via [employer].  Patient aware viewing lab result online will reveal fetal sex if ordered.    Patient verbalized understanding of all instructions and no questions at this time.

## 2025-07-08 NOTE — LETTER
2025     Radha Sales MD  [address] [place] [id number]    Patient: Shelli Casanova   YOB: 2000   Date of Visit: 2025       Dear Dr. Radha Sales MD:    Thank you for referring Shelli Casanova to me for evaluation. Below are my notes for this consultation.    If you have questions, please do not hesitate to call me. I look forward to following your patient along with you.         Sincerely,        Scott Herman MD        CC: No Recipients    Scott Herman MD  2025  4:26 PM  Sign when Signing Visit  CONSULTATION: MATERNAL-FETAL MEDICINE  Name: Shelli Casanova      : 2000      MRN: [id number]  Encounter Provider: Scott Herman MD  Encounter Date: 2025   Encounter department: [facility]  :  Assessment & Plan  12 weeks gestation of pregnancy    Orders:  •  [id number] PLUS Core+SCA    Asthma affecting pregnancy in first trimester  History of intermittent asthma. Asthma complicates 4-8% of pregnancies. The majority of women with asthma experience stability or improvement in symptoms in pregnancy, while approximately 1/3 experience worsening. Need for a rescue inhaler more than twice weekly indicates suboptimal asthma control and need for escalation in therapy.  The majority of asthma medications are safe for pregnancy, and disease control is important for maternal and fetal health in pregnancy. If her symptoms remain well-controlled in pregnancy, no additional obstetric surveillance is indicated. However, if control is poor, there is a risk of fetal growth restriction, and evaluation of fetal growth in the second half of pregnancy is likely warranted. Prevention of respiratory morbidity is particularly important in pregnancy. Annual influenza recommendation is  recommended, as is pneumococcal vaccination if not performed previously.  Orders:  •  Ambulatory Referral to Maternal Fetal Medicine    Encounter for  screening  for chromosomal anomalies  Screening and diagnostic tests available for fetal aneuploidy were discussed. The Sequential Screen was discussed in detail, including the sensitivity for detection of Down syndrome estimated at 95%.      Cell-free DNA (cfDNA)  (Non invasive prenatal testing)-NIPT) screening has a 99% detection rate for Down syndrome, 96% for trisomy 18, and 91% for trisomy 13 with <1% false positive rate.Sex chromosome analysis (SCA) is an option for cell-free DNA (NIPT), to estimate risk of a sex chromosome aneuploidy. It may need to obtain approval from the insurance company. Ms Casanova received instructions on how to contact her insurance company .  Indicates wants to complete SCA analysis from NIPT testing, had a blood sample drawn in the office today    Ms Casanova  declined use of definitive prenatal diagnosis, which is possible only through genetic amniocentesis or [facility].  Orders:  •  [id number] PLUS Core+SCA    Anxiety and depression  Counseling to reduce the risk of postpartum depression. There are significant risks associated with untreated and under treated depression and other mental illnesses in pregnancy. The [facility] [place] is an additional resource for screening and treatment of depression. Resources for a mental health crisis include the emergency department and the National Suicide Prevention Hotline at 9-135-616-IFTS.  These interventions can reduce the risk of postpartum depression. It is recommended this be arranged through your office.       Hyperthyroidism    In general good maternal and fetal outcomes depend on good control of hyperthyroidism. The goal in pregnancy is to maintain persistent but mild hyperthyroidism in order to avoid fetal hypothyroidism and/or goiter from higher transfer of antithyroid medications across the placenta, and balancing the risk of transient central hypothyroidism from poorly controlled hyperthyroidism in pregnancy.     The goal maternal serum  free T4 concentration should be maintained at or just above trimester specific ranges for pregnancy. If trimester specific ranges are not available through the laboratory performing the analysis, total T4 and total T3 should be 1.5 times higher than the nonpregnant reference range. Serum TSH should be below the reference range for pregnancy (i.e. approximately 0.1 to 0.3 mU/L) through the lowest possible dose of the medication.     Maternal antithyroid antibodies should also be checked. Serum TRAb should be measured at least once and again at 18-22 weeks and 30-34 weeks if elevated. If it disappears it could mean remission of Graves, and high levels would indicate increased risk of fetal/ hyperthyroidism requiring  and  monitoring.             History of Present Illness    Shelli is a 25 y.o.  at 12w2d presenting for consultation for aneuploidy screening and hyperthyroidism. She reports no obstetric complaints today.    Her Antepartum course is significant for the following problems: Problem List[1] .    Past Medical History  OB History    Para Term  AB Living   2 0 0 0 1 0   SAB IAB Ectopic Multiple Live Births   1 0 0 0 0      # Outcome Date GA Lbr Sundeep/2nd Weight Sex Type Anes PTL Lv   2 Current            1 SAB 2023     SAB        Past Medical History[2]  Past Surgical History[3]    Social History:   She denies current use of alcohol, drugs of abuse, tobacco, and marijuana products.   Occupation: home  Partner: Joe is 22 yo works as a security h guard healthy had to leave for work today    Family History:  Family history was reviewed using an office screening tool, and is negative for congenital anomalies, genetic diseases, and thromboembolism in first degree relatives of this pregnancy.     Current Medications[4]  Allergies: No Known Allergies      Objective  /68 (BP Location: Right arm, Patient Position: Sitting, Cuff Size: Standard)   Pulse 99   Ht  "5' 6.25\" (1.683 m)   Wt 84.4 kg (186 lb)   LMP 2025 (Exact Date)   BMI 29.80 kg/m²      Patient's last menstrual period was 2025 (exact date).  Estimated Delivery Date: 2026, by Last Menstrual Period    Physical Exam  General appearance: alert, well appearing, and in no distress and oriented to person, place, and time.  The remainder of her physical examination was deferred as she was here today for consultation and discussion.    Please refer to \"Imaging\" for ultrasound report from today's visit.     Plan and recommendations:    MSAFP to screen for open neural tube defects after 16 and before 22 weeks gestation to be ordered and followed from your office    2. Monitoring for the diagnosis of hyperthyroidism with continued management by endocrinologist  - If TSH becomes low, free T4 should be checked and referral to endocrinology would be recommended for close monitoring and medications.  - If patients are ever symptomatic with moderate to severe hyperthyroidism, beta blockers can be used (metoprolol 25- 50 mg daily or propranolol 20 mg every 6-8 hours), though this should then be weaned for concern for growth restriction hypoglycemia bradycardia and respiratory depression in the .  - TRAb antibody and TSI   If Trab is >2 times the upper limit of normal additional fetal monitoring will be indicated with serial growth scans and close monitoring of fetal heart rate   - Notify pediatrics after delivery for adequate thyroid monitoring    3. Use of aspirin 162 mg daily  (81 miligram baby aspirin two tablets)   up to 36 weeks gestation is recommended. New data suggests that a dose higher than 100 mg and started before 16 weeks gestation can reduce te risk of  preeclampsia ([facility]  2017  Manny SOMMERS et al).  4.  Arranging counseling for the diagnosis of depression and anxiety from your office to reduce the risk of postpartum depression  5.  Follow-up ultrasound examination at 20 weeks " for fetal anatomical evaluation and monitoring of cervical length.    Thank you for referring Ms. Casanova to our care maternal-fetal medicine, do not hesitate to contact us if we can be of further assistance.    Scott Herman MD, MSCE    Maternal-fetal medicine    Administrative Statements  I have spent a total time of 30 minutes in caring for this patient on the day of the visit/encounter including Diagnostic results, Prognosis, Risks and benefits of tx options, Instructions for management, Patient and family education, Importance of tx compliance, Risk factor reductions, Impressions, Counseling / Coordination of care, Documenting in the medical record, Reviewing/placing orders in the medical record (including tests, medications, and/or procedures), Obtaining or reviewing history  , and Communicating with other healthcare professionals .         [1]   Patient Active Problem List  Diagnosis   • BMI 33.0-33.9,adult   • GERD (gastroesophageal reflux disease)   • Anxiety   • Hyperthyroidism   • Elevated prolactin level   • Encounter for preconception consultation   • 12 weeks gestation of pregnancy   • Asthma   [2]   Past Medical History:  Diagnosis Date   • Asthma     Childhood   • Chlamydia 2022    treated   • Depression    • GERD (gastroesophageal reflux disease)    • Hyperthyroidism    • Postoperative wound infection 4/28/2023    big abscess   [3]   Past Surgical History:  Procedure Laterality Date   • NO PAST SURGERIES     [4]   Current Outpatient Medications:   •  albuterol (ProAir HFA) 90 mcg/act inhaler, Inhale 2 puffs every 6 (six) hours as needed for wheezing, Disp: 8.5 g, Rfl: 1  •  metoclopramide (REGLAN) 10 mg tablet, Take 1 tablet (10 mg total) by mouth every 6 (six) hours, Disp: 30 tablet, Rfl: 0  •  Prenatal MV-Min-Fe Fum-FA-DHA (PRENATAL 1 PO), Take by mouth, Disp: , Rfl:   •  propylthiouracil 50 mg tablet, TAKE 1 TABLET BY MOUTH EVERY DAY, Disp: 90 tablet, Rfl: 3  •  Prenatal Vit-Fe Fumarate-FA  (Prenatal Plus Vitamin/Mineral) 27-1 MG TABS, Take 1 tablet by mouth in the morning (Patient not taking: Reported on 1/7/2025), Disp: 90 tablet, Rfl: 4

## 2025-07-09 NOTE — ASSESSMENT & PLAN NOTE
In general good maternal and fetal outcomes depend on good control of hyperthyroidism. The goal in pregnancy is to maintain persistent but mild hyperthyroidism in order to avoid fetal hypothyroidism and/or goiter from higher transfer of antithyroid medications across the placenta, and balancing the risk of transient central hypothyroidism from poorly controlled hyperthyroidism in pregnancy.     The goal maternal serum free T4 concentration should be maintained at or just above trimester specific ranges for pregnancy. If trimester specific ranges are not available through the laboratory performing the analysis, total T4 and total T3 should be 1.5 times higher than the nonpregnant reference range. Serum TSH should be below the reference range for pregnancy (i.e. approximately 0.1 to 0.3 mU/L) through the lowest possible dose of the medication.     Maternal antithyroid antibodies should also be checked. Serum TRAb should be measured at least once and again at 18-22 weeks and 30-34 weeks if elevated. If it disappears it could mean remission of Graves, and high levels would indicate increased risk of fetal/ hyperthyroidism requiring  and  monitoring.

## 2025-07-09 NOTE — PROGRESS NOTES
CONSULTATION: MATERNAL-FETAL MEDICINE  Name: Shelli Casanova      : 2000      MRN: [id number]  Encounter Provider: Scott Herman MD  Encounter Date: 2025   Encounter department: [facility]  :  Assessment & Plan  12 weeks gestation of pregnancy    Orders:    [id number] PLUS Core+SCA    Asthma affecting pregnancy in first trimester  History of intermittent asthma. Asthma complicates 4-8% of pregnancies. The majority of women with asthma experience stability or improvement in symptoms in pregnancy, while approximately 1/3 experience worsening. Need for a rescue inhaler more than twice weekly indicates suboptimal asthma control and need for escalation in therapy.  The majority of asthma medications are safe for pregnancy, and disease control is important for maternal and fetal health in pregnancy. If her symptoms remain well-controlled in pregnancy, no additional obstetric surveillance is indicated. However, if control is poor, there is a risk of fetal growth restriction, and evaluation of fetal growth in the second half of pregnancy is likely warranted. Prevention of respiratory morbidity is particularly important in pregnancy. Annual influenza recommendation is  recommended, as is pneumococcal vaccination if not performed previously.  Orders:    Ambulatory Referral to Maternal Fetal Medicine    Encounter for  screening for chromosomal anomalies  Screening and diagnostic tests available for fetal aneuploidy were discussed. The Sequential Screen was discussed in detail, including the sensitivity for detection of Down syndrome estimated at 95%.      Cell-free DNA (cfDNA)  (Non invasive prenatal testing)-NIPT) screening has a 99% detection rate for Down syndrome, 96% for trisomy 18, and 91% for trisomy 13 with <1% false positive rate.Sex chromosome analysis (SCA) is an option for cell-free DNA (NIPT), to estimate risk of a sex chromosome aneuploidy. It may need to obtain  approval from the insurance company. Ms Casanova received instructions on how to contact her insurance company .  Indicates wants to complete SCA analysis from NIPT testing, had a blood sample drawn in the office today    Ms Casanova  declined use of definitive prenatal diagnosis, which is possible only through genetic amniocentesis or [facility].  Orders:    [id number] PLUS Core+SCA    Anxiety and depression  Counseling to reduce the risk of postpartum depression. There are significant risks associated with untreated and under treated depression and other mental illnesses in pregnancy. The [facility] [place] is an additional resource for screening and treatment of depression. Resources for a mental health crisis include the emergency department and the National Suicide Prevention Hotline at 5-238-434-HFSB.  These interventions can reduce the risk of postpartum depression. It is recommended this be arranged through your office.       Hyperthyroidism    In general good maternal and fetal outcomes depend on good control of hyperthyroidism. The goal in pregnancy is to maintain persistent but mild hyperthyroidism in order to avoid fetal hypothyroidism and/or goiter from higher transfer of antithyroid medications across the placenta, and balancing the risk of transient central hypothyroidism from poorly controlled hyperthyroidism in pregnancy.     The goal maternal serum free T4 concentration should be maintained at or just above trimester specific ranges for pregnancy. If trimester specific ranges are not available through the laboratory performing the analysis, total T4 and total T3 should be 1.5 times higher than the nonpregnant reference range. Serum TSH should be below the reference range for pregnancy (i.e. approximately 0.1 to 0.3 mU/L) through the lowest possible dose of the medication.     Maternal antithyroid antibodies should also be checked. Serum TRAb should be measured at least once and again at 18-22 weeks  "and 30-34 weeks if elevated. If it disappears it could mean remission of Graves, and high levels would indicate increased risk of fetal/ hyperthyroidism requiring  and  monitoring.             History of Present Illness     Shelli is a 25 y.o.  at 12w2d presenting for consultation for aneuploidy screening and hyperthyroidism. She reports no obstetric complaints today.    Her Antepartum course is significant for the following problems: Problem List[1] .    Past Medical History   OB History    Para Term  AB Living   2 0 0 0 1 0   SAB IAB Ectopic Multiple Live Births   1 0 0 0 0      # Outcome Date GA Lbr Sundeep/2nd Weight Sex Type Anes PTL Lv   2 Current            1 SAB 2023     SAB        Past Medical History[2]  Past Surgical History[3]    Social History:   She denies current use of alcohol, drugs of abuse, tobacco, and marijuana products.   Occupation: home  Partner: Joe is 22 yo works as a security h guard healthy had to leave for work today    Family History:  Family history was reviewed using an office screening tool, and is negative for congenital anomalies, genetic diseases, and thromboembolism in first degree relatives of this pregnancy.     Current Medications[4]  Allergies: No Known Allergies      Objective   /68 (BP Location: Right arm, Patient Position: Sitting, Cuff Size: Standard)   Pulse 99   Ht 5' 6.25\" (1.683 m)   Wt 84.4 kg (186 lb)   LMP 2025 (Exact Date)   BMI 29.80 kg/m²      Patient's last menstrual period was 2025 (exact date).  Estimated Delivery Date: 2026, by Last Menstrual Period    Physical Exam  General appearance: alert, well appearing, and in no distress and oriented to person, place, and time.  The remainder of her physical examination was deferred as she was here today for consultation and discussion.    Please refer to \"Imaging\" for ultrasound report from today's visit.     Plan and " recommendations:    MSAFP to screen for open neural tube defects after 16 and before 22 weeks gestation to be ordered and followed from your office    2. Monitoring for the diagnosis of hyperthyroidism with continued management by endocrinologist  - If TSH becomes low, free T4 should be checked and referral to endocrinology would be recommended for close monitoring and medications.  - If patients are ever symptomatic with moderate to severe hyperthyroidism, beta blockers can be used (metoprolol 25- 50 mg daily or propranolol 20 mg every 6-8 hours), though this should then be weaned for concern for growth restriction hypoglycemia bradycardia and respiratory depression in the .  - TRAb antibody and TSI   If Trab is >2 times the upper limit of normal additional fetal monitoring will be indicated with serial growth scans and close monitoring of fetal heart rate   - Notify pediatrics after delivery for adequate thyroid monitoring    3. Use of aspirin 162 mg daily  (81 miligram baby aspirin two tablets)   up to 36 weeks gestation is recommended. New data suggests that a dose higher than 100 mg and started before 16 weeks gestation can reduce te risk of  preeclampsia ([facility]  2017  Manny SOMMERS et al).  4.  Arranging counseling for the diagnosis of depression and anxiety from your office to reduce the risk of postpartum depression  5.  Follow-up ultrasound examination at 20 weeks for fetal anatomical evaluation and monitoring of cervical length.    Thank you for referring Ms. Casanova to our care maternal-fetal medicine, do not hesitate to contact us if we can be of further assistance.    Scott Herman MD, MSCE    Maternal-fetal medicine    Administrative Statements   I have spent a total time of 30 minutes in caring for this patient on the day of the visit/encounter including Diagnostic results, Prognosis, Risks and benefits of tx options, Instructions for management, Patient and family education,  Importance of tx compliance, Risk factor reductions, Impressions, Counseling / Coordination of care, Documenting in the medical record, Reviewing/placing orders in the medical record (including tests, medications, and/or procedures), Obtaining or reviewing history  , and Communicating with other healthcare professionals .         [1]   Patient Active Problem List  Diagnosis    BMI 33.0-33.9,adult    GERD (gastroesophageal reflux disease)    Anxiety    Hyperthyroidism    Elevated prolactin level    Encounter for preconception consultation    12 weeks gestation of pregnancy    Asthma   [2]   Past Medical History:  Diagnosis Date    Asthma     Childhood    Chlamydia 2022    treated    Depression     GERD (gastroesophageal reflux disease)     Hyperthyroidism     Postoperative wound infection 4/28/2023    big abscess   [3]   Past Surgical History:  Procedure Laterality Date    NO PAST SURGERIES     [4]   Current Outpatient Medications:     albuterol (ProAir HFA) 90 mcg/act inhaler, Inhale 2 puffs every 6 (six) hours as needed for wheezing, Disp: 8.5 g, Rfl: 1    metoclopramide (REGLAN) 10 mg tablet, Take 1 tablet (10 mg total) by mouth every 6 (six) hours, Disp: 30 tablet, Rfl: 0    Prenatal MV-Min-Fe Fum-FA-DHA (PRENATAL 1 PO), Take by mouth, Disp: , Rfl:     propylthiouracil 50 mg tablet, TAKE 1 TABLET BY MOUTH EVERY DAY, Disp: 90 tablet, Rfl: 3    Prenatal Vit-Fe Fumarate-FA (Prenatal Plus Vitamin/Mineral) 27-1 MG TABS, Take 1 tablet by mouth in the morning (Patient not taking: Reported on 1/7/2025), Disp: 90 tablet, Rfl: 4

## 2025-07-12 LAB
CFDNA.FET/CFDNA.TOTAL SFR FETUS: NORMAL %
CFDNA.FET/CFDNA.TOTAL SFR FETUS: NORMAL %
CITATION REF LAB TEST: NORMAL
FET 13+18+21+X+Y ANEUP PLAS.CFDNA: NEGATIVE
FET CHR 21 TS PLAS.CFDNA QL: NEGATIVE
FET CHR 21 TS PLAS.CFDNA QL: NEGATIVE
FET MS X RISK WBC.DNA+CFDNA QL: NOT DETECTED
FET SEX PLAS.CFDNA DOSAGE CFDNA: NORMAL
FET TS 13 RISK PLAS.CFDNA QL: NEGATIVE
FET X + Y ANEUP RISK PLAS.CFDNA SEQ-IMP: NOT DETECTED
GA EST FROM CONCEPTION DATE: NORMAL D
GESTATIONAL AGE > 9:: YES
LAB DIRECTOR NAME PROVIDER: NORMAL
LABORATORY COMMENT REPORT: NORMAL
LIMITATIONS OF THE TEST: NORMAL
NEGATIVE PREDICTIVE VALUE: NORMAL
PERFORMANCE CHARACTERISTICS: NORMAL
POSITIVE PREDICTIVE VALUE: NORMAL
REF LAB TEST METHOD: NORMAL
SL AMB NOTE:: NORMAL
TEST PERFORMANCE INFO SPEC: NORMAL

## 2025-07-17 DIAGNOSIS — E05.90 HYPERTHYROIDISM: Primary | ICD-10-CM

## 2025-07-17 RX ORDER — METHIMAZOLE 5 MG/1
TABLET ORAL
Qty: 30 TABLET | Refills: 1 | Status: SHIPPED | OUTPATIENT
Start: 2025-07-17

## 2025-07-22 PROBLEM — Z3A.14 14 WEEKS GESTATION OF PREGNANCY: Status: ACTIVE | Noted: 2025-07-07

## 2025-07-24 ENCOUNTER — OFFICE VISIT (OUTPATIENT)
Dept: ENDOCRINOLOGY | Facility: CLINIC | Age: 25
End: 2025-07-24
Payer: COMMERCIAL

## 2025-07-24 VITALS
SYSTOLIC BLOOD PRESSURE: 110 MMHG | BODY MASS INDEX: 30.79 KG/M2 | DIASTOLIC BLOOD PRESSURE: 70 MMHG | HEART RATE: 104 BPM | WEIGHT: 191.6 LBS | HEIGHT: 66 IN | OXYGEN SATURATION: 98 %

## 2025-07-24 DIAGNOSIS — E05.90 HYPERTHYROIDISM: Primary | ICD-10-CM

## 2025-07-24 PROCEDURE — 99214 OFFICE O/P EST MOD 30 MIN: CPT | Performed by: INTERNAL MEDICINE

## 2025-07-24 NOTE — ASSESSMENT & PLAN NOTE
Currently seems euthyroid. Last TFTs were normal on PTU. She just changed to Methimazole 5mg daily. Will get labs in 2 weeks and determine if she needs any medication in the 2nd trimester.

## 2025-07-27 ENCOUNTER — PATIENT MESSAGE (OUTPATIENT)
Age: 25
End: 2025-07-27

## 2025-07-28 ENCOUNTER — TELEPHONE (OUTPATIENT)
Dept: OBGYN CLINIC | Facility: CLINIC | Age: 25
End: 2025-07-28

## 2025-07-28 ENCOUNTER — NURSE TRIAGE (OUTPATIENT)
Age: 25
End: 2025-07-28

## 2025-07-29 ENCOUNTER — PATIENT OUTREACH (OUTPATIENT)
Dept: OBGYN CLINIC | Facility: CLINIC | Age: 25
End: 2025-07-29

## 2025-07-31 ENCOUNTER — APPOINTMENT (OUTPATIENT)
Dept: LAB | Facility: HOSPITAL | Age: 25
End: 2025-07-31
Payer: COMMERCIAL

## 2025-07-31 DIAGNOSIS — Z3A.14 14 WEEKS GESTATION OF PREGNANCY: ICD-10-CM

## 2025-07-31 PROCEDURE — 82105 ALPHA-FETOPROTEIN SERUM: CPT

## 2025-07-31 PROCEDURE — 36415 COLL VENOUS BLD VENIPUNCTURE: CPT

## 2025-08-03 PROBLEM — Z3A.16 16 WEEKS GESTATION OF PREGNANCY: Status: ACTIVE | Noted: 2025-07-07

## 2025-08-04 ENCOUNTER — ROUTINE PRENATAL (OUTPATIENT)
Dept: OBGYN CLINIC | Facility: CLINIC | Age: 25
End: 2025-08-04

## 2025-08-04 VITALS
DIASTOLIC BLOOD PRESSURE: 57 MMHG | SYSTOLIC BLOOD PRESSURE: 89 MMHG | BODY MASS INDEX: 31.31 KG/M2 | WEIGHT: 194.8 LBS | HEIGHT: 66 IN | HEART RATE: 78 BPM | RESPIRATION RATE: 18 BRPM

## 2025-08-04 DIAGNOSIS — M54.9 BACK PAIN IN PREGNANCY: ICD-10-CM

## 2025-08-04 DIAGNOSIS — O99.891 BACK PAIN IN PREGNANCY: ICD-10-CM

## 2025-08-04 DIAGNOSIS — Z3A.16 16 WEEKS GESTATION OF PREGNANCY: Primary | ICD-10-CM

## 2025-08-04 RX ORDER — LIDOCAINE 50 MG/G
1 PATCH TOPICAL DAILY
Qty: 5 PATCH | Refills: 0 | Status: SHIPPED | OUTPATIENT
Start: 2025-08-04 | End: 2025-08-09

## 2025-08-05 ENCOUNTER — APPOINTMENT (OUTPATIENT)
Dept: LAB | Facility: HOSPITAL | Age: 25
End: 2025-08-05
Payer: COMMERCIAL

## 2025-08-05 DIAGNOSIS — E05.90 HYPERTHYROIDISM: ICD-10-CM

## 2025-08-05 LAB
T4 FREE SERPL-MCNC: 0.73 NG/DL (ref 0.61–1.12)
TSH SERPL DL<=0.05 MIU/L-ACNC: 0.58 UIU/ML (ref 0.45–4.5)

## 2025-08-05 PROCEDURE — 84439 ASSAY OF FREE THYROXINE: CPT

## 2025-08-05 PROCEDURE — 84443 ASSAY THYROID STIM HORMONE: CPT

## 2025-08-05 PROCEDURE — 36415 COLL VENOUS BLD VENIPUNCTURE: CPT

## 2025-08-06 LAB
2ND TRIMESTER 4 SCREEN SERPL-IMP: NORMAL
AFP ADJ MOM SERPL: 1.84
AFP INTERP AMN-IMP: NORMAL
AFP INTERP SERPL-IMP: NORMAL
AFP INTERP SERPL-IMP: NORMAL
AFP SERPL-MCNC: 48.4 NG/ML
AGE AT DELIVERY: 23.5 YR
GA METHOD: NORMAL
GA: 15.4 WEEKS
IDDM PATIENT QL: NO
MULTIPLE PREGNANCY: NO
NEURAL TUBE DEFECT RISK FETUS: 1162 %

## 2025-08-10 LAB
CITATION REF LAB TEST: NORMAL
CLINICAL INFO: NORMAL
ETHNIC BACKGROUND STATED: NORMAL
FMR1 GENE CGG RPT BLD/T QL: NORMAL
GENE DIS ANL CARRIER INTERP-IMP: NORMAL
INDICATION: NORMAL
LAB DIRECTOR NAME PROVIDER: NORMAL
RECOMMENDATION PATIENT DOC-IMP: NORMAL
REF LAB TEST METHOD: NORMAL
SERVICE CMNT-IMP: NORMAL
SPECIMEN SOURCE: NORMAL

## 2025-08-14 LAB
CITATION REF LAB TEST: NORMAL
ETHNIC BACKGROUND STATED: NORMAL
GENE DIS ANL CARRIER INTERP-IMP: NORMAL
GENE STUDIED ID: NORMAL
LAB DIRECTOR NAME PROVIDER: NORMAL
LABORATORY COMMENT REPORT: NORMAL
Lab: NORMAL
REASON FOR REFERRAL (NARRATIVE): NORMAL
RECOMMENDATION PATIENT DOC-IMP: NORMAL
REF LAB TEST METHOD: NORMAL
SMN1 GENE MUT ANL BLD/T: NORMAL
SPECIMEN PREPARATION: NORMAL

## 2025-08-27 PROBLEM — O46.92 VAGINAL BLEEDING IN PREGNANCY, SECOND TRIMESTER: Status: ACTIVE | Noted: 2025-08-27

## 2025-08-27 PROBLEM — Z3A.19 19 WEEKS GESTATION OF PREGNANCY: Status: ACTIVE | Noted: 2025-07-07

## 2025-08-27 PROBLEM — N88.3 CERVICAL INCOMPETENCE: Status: ACTIVE | Noted: 2025-08-27
